# Patient Record
Sex: MALE | Race: BLACK OR AFRICAN AMERICAN | NOT HISPANIC OR LATINO | Employment: OTHER | URBAN - METROPOLITAN AREA
[De-identification: names, ages, dates, MRNs, and addresses within clinical notes are randomized per-mention and may not be internally consistent; named-entity substitution may affect disease eponyms.]

---

## 2017-01-13 DIAGNOSIS — N32.81 OVERACTIVE BLADDER: Primary | ICD-10-CM

## 2017-01-13 RX ORDER — OXYBUTYNIN CHLORIDE 5 MG/1
5 TABLET ORAL 3 TIMES DAILY
Qty: 90 TABLET | Refills: 1 | OUTPATIENT
Start: 2017-01-13

## 2017-01-13 NOTE — TELEPHONE ENCOUNTER
Request for medication refill:    Date of last visit at clinic: 11/04/16    Please complete refill if appropriate and CLOSE ENCOUNTER.    Closing the encounter signifies the refill is complete.    If refill has been denied, please complete the smart phrase .smirefuse and route it to the [facility] RN TRIAGE pool to inform the patient and the pharmacy.    Zahida Bishop

## 2017-01-24 DIAGNOSIS — R64 CACHEXIA (H): Primary | ICD-10-CM

## 2017-01-24 RX ORDER — LACTOSE-REDUCED FOOD 0.04G-1.05
1 LIQUID (ML) ORAL
Qty: 90 EACH | Refills: 6 | Status: CANCELLED | OUTPATIENT
Start: 2017-01-24

## 2017-01-24 NOTE — TELEPHONE ENCOUNTER
Request for medication refill:    Date of last visit at clinic: 11-4-16    Please complete refill if appropriate and CLOSE ENCOUNTER.    Closing the encounter signifies the refill is complete.    If refill has been denied, please complete the smart phrase .smirefuse and route it to the [facility] RN TRIAGE pool to inform the patient and the pharmacy.    Ruth Swartz

## 2017-02-15 ENCOUNTER — DOCUMENTATION ONLY (OUTPATIENT)
Dept: VASCULAR SURGERY | Facility: CLINIC | Age: 77
End: 2017-02-15

## 2017-02-15 DIAGNOSIS — N32.81 OVERACTIVE BLADDER: ICD-10-CM

## 2017-02-15 RX ORDER — OXYBUTYNIN CHLORIDE 5 MG/1
5 TABLET ORAL 3 TIMES DAILY
Qty: 90 TABLET | Refills: 1 | OUTPATIENT
Start: 2017-02-15

## 2017-02-15 NOTE — TELEPHONE ENCOUNTER
Date of last visit at clinic: 11/4/16    Please complete refill and CLOSE ENCOUNTER.  Closing the encounter signifies the refill is complete.

## 2017-02-16 NOTE — TELEPHONE ENCOUNTER
Called and left  for pharmacy advising patient is not seen at [facility].    Purvi Bartlett RN

## 2017-04-06 DIAGNOSIS — N40.1 BENIGN NON-NODULAR PROSTATIC HYPERPLASIA WITH LOWER URINARY TRACT SYMPTOMS: ICD-10-CM

## 2017-04-06 RX ORDER — TAMSULOSIN HYDROCHLORIDE 0.4 MG/1
0.4 CAPSULE ORAL
Qty: 180 CAPSULE | Refills: 0 | Status: SHIPPED | OUTPATIENT
Start: 2017-04-06 | End: 2017-07-27

## 2017-04-06 NOTE — TELEPHONE ENCOUNTER
Request for medication refill:    Date of last visit at clinic: 11/04/2016    Please complete refill if appropriate and CLOSE ENCOUNTER.    Closing the encounter signifies the refill is complete.    If refill has been denied, please complete the smart phrase .smirefuse and route it to the [facility] RN TRIAGE pool to inform the patient and the pharmacy.    Heather Phillips MA

## 2017-04-10 ENCOUNTER — OFFICE VISIT (OUTPATIENT)
Dept: NEUROSURGERY | Facility: CLINIC | Age: 77
End: 2017-04-10

## 2017-04-10 VITALS — HEIGHT: 62 IN | SYSTOLIC BLOOD PRESSURE: 115 MMHG | DIASTOLIC BLOOD PRESSURE: 64 MMHG | HEART RATE: 72 BPM

## 2017-04-10 DIAGNOSIS — Z98.1 S/P LAMINECTOMY WITH SPINAL FUSION: Primary | ICD-10-CM

## 2017-04-10 ASSESSMENT — PAIN SCALES - GENERAL: PAINLEVEL: NO PAIN (0)

## 2017-04-10 NOTE — NURSING NOTE
Chief Complaint   Patient presents with     RECHECK     8/27/15 Thoracic surgery, imaging done.    Branden Zaragoza CMA

## 2017-04-10 NOTE — LETTER
"4/10/2017       RE: Bhaskar Ott  [address]    [place] [address]     Dear Colleague,    Thank you for referring your patient, Bhaskar Ott, to the [facility] NEUROSURGERY at [facility]. Please see a copy of my visit note below.    It was a pleasure to see Bhaskar Ott today in Neurosurgery Clinic. he is a 76 year old male who has previously undergone surgery with Dr. Mcdaniels. He is here for follow up of his imaging studies from his last visit. He has a cervical MRI that shows some stenosis. He has flexion extension views that show no obvious instability.  He continues to have pain in the L hip and leg, for which Dr. Mcdaniels has previously recommended assessment for a L ANNE.    Vitals:    04/10/17 1107   BP: 115/64   Pulse: 72   Height: 1.575 m (5' 2\")     There is no height or weight on file to calculate BMI.  No Pain (0)    Strength 5/5 BUE/LE    Imaging as above.    A: s/p thoracolumbar fusion.    P: RTC 1 year with standing scoliosis xrays. No surgical intervention at this time.    Again, thank you for allowing me to participate in the care of your patient.      Sincerely,    Saul Bettencourt MD      "

## 2017-04-10 NOTE — MR AVS SNAPSHOT
After Visit Summary   4/10/2017    Bhaskar Ott    MRN: [id number]           Patient Information     Date Of Birth          1940        Visit Information        Provider Department      4/10/2017 11:15 AM Saul Bettencourt MD [facility] Neurosurgery        Today's Diagnoses     S/P laminectomy with spinal fusion    -  1       Follow-ups after your visit        Follow-up notes from your care team     Return in about 1 year (around 4/10/2018) for Imaging.      Your next 10 appointments already scheduled     Apr 09, 2018 10:50 AM CDT   XR THORACIC/LUMBAR STANDING 2 VIEWS (SCOLI) with UCXR4   [facility] Xray ([facility])    [address]  1st Floor  [facility] [address] [phone]           Please bring a list of your current medicines to your exam. (Include vitamins, minerals and over-thecounter medicines.) Leave your valuables at home.  Tell your doctor if there is a chance you may be pregnant.  You do not need to do anything special for this exam.            Apr 09, 2018 11:15 AM CDT   (Arrive by 11:00 AM)   Return Visit with Saul Bettencourt MD   [facility] Neurosurgery ([facility])    [address]  3rd Floor  [facility] [address] [phone]              Future tests that were ordered for you today     Open Future Orders        Priority Expected Expires Ordered    XR Thor/Lumb Standing 2 Views (Scoli) Routine 4/10/2018 4/10/2018 4/10/2017            Who to contact     Please call your clinic at [phone] to:    Ask questions about your health    Make or cancel appointments    Discuss your medicines    Learn about your test results    Speak to your doctor   If you have compliments or concerns about an experience at your clinic, or if you wish to file a complaint, please contact [facility] Patient Relations at [phone] or email us at  [email or web address]         Additional Information About Your Visit        [employer] Information     [employer] is an electronic gateway that provides easy, online access to your medical records. With [employer], you can request a clinic appointment, read your test results, renew a prescription or communicate with your care team.     To sign up for [employer] visit the website at [email or web address]   You will be asked to enter the access code listed below, as well as some personal information. Please follow the directions to create your username and password.     Your access code is: [email or web address]  Expires: 2017  7:31 AM     Your access code will  in 90 days. If you need help or a new code, please contact your [facility] or call [phone] for assistance.        Care EveryWhere ID     This is your Care EveryWhere ID. This could be used by other organizations to access your [place] medical records  [id number]        Your Vitals Were     Pulse Height                72 1.575 m (5' 2\")           Blood Pressure from Last 3 Encounters:   04/10/17 115/64   16 117/68   16 126/73    Weight from Last 3 Encounters:   16 60.8 kg (134 lb)   16 62.7 kg (138 lb 3.2 oz)   16 60.6 kg (133 lb 9.6 oz)               Primary Care Provider Office Phone # Fax #    Camille Heard [phone]       [place] [address] [address]        Thank you!     Thank you for choosing [facility]  for your care. Our goal is always to provide you with excellent care. Hearing back from our patients is one way we can continue to improve our services. Please take a few minutes to complete the written survey that you may receive in the mail after your visit with us. Thank you!             Your Updated Medication List - Protect others around you: Learn how to safely use, store and throw away your " medicines at www.disposemymeds.org.          This list is accurate as of: 4/10/17 12:11 PM.  Always use your most recent med list.                   Brand Name Dispense Instructions for use    amLODIPine 5 MG tablet    NORVASC    30 tablet    Take 1 tablet (5 mg) by mouth daily       B-12 1000 MCG Tbcr     100 tablet    Take 1,000 mcg by mouth daily       BOOST BREEZE Liqd     90 each    Take 1 Can by mouth 3 times daily (with meals)       calcium carb 1250 mg (500 mg [place])/vitamin D 200 units 500-200 MG-UNIT per tablet    OSCAL with D    90 tablet    Take 1 tablet by mouth daily       cholecalciferol 1000 UNIT tablet    vitamin D    90 tablet    Take 1 tablet (1,000 Units) by mouth daily       ferrous gluconate 325 (36 FE) MG Tabs     180 tablet    Take 1 tablet by mouth 2 times daily       finasteride 5 MG tablet    PROSCAR    90 tablet    Take 1 tablet (5 mg) by mouth daily       MULTIvitamin  S Caps     90 capsule    Take 1 tablet by mouth daily       omeprazole 20 MG CR capsule    priLOSEC    90 capsule    Take 1 capsule (20 mg) by mouth 2 times daily       oxybutynin 5 MG tablet    DITROPAN    90 tablet    Take 1 tablet (5 mg) by mouth 3 times daily       oxyCODONE 5 MG IR tablet    ROXICODONE    80 tablet    Take 1 tablet (5 mg) by mouth every 12 hours as needed for moderate to severe pain       polyethylene glycol Packet    MIRALAX/GLYCOLAX    30 packet    Take 34 g by mouth daily       pravastatin 40 MG tablet    PRAVACHOL    90 tablet    Take 1 tablet (40 mg) by mouth daily       senna-docusate 8.6-50 MG per tablet    SENOKOT-S;PERICOLACE    60 tablet    Take 1 tablet by mouth 2 times daily       tamsulosin 0.4 MG capsule    FLOMAX    180 capsule    Take 1 capsule (0.4 mg) by mouth two times daily       traZODone 50 MG tablet    DESYREL    60 tablet    Take 1-2 tablets ( mg) by mouth nightly as needed for sleep May increase to 2 tabs after one week if ineffective

## 2017-04-10 NOTE — PROGRESS NOTES
"It was a pleasure to see Bhaskar Ott today in Neurosurgery Clinic. he is a 76 year old male who has previously undergone surgery with Dr. Mcdaniels. He is here for follow up of his imaging studies from his last visit. He has a cervical MRI that shows some stenosis. He has flexion extension views that show no obvious instability.  He continues to have pain in the L hip and leg, for which Dr. Mcdaniels has previously recommended assessment for a L ANNE.    Vitals:    04/10/17 1107   BP: 115/64   Pulse: 72   Height: 1.575 m (5' 2\")     There is no height or weight on file to calculate BMI.  No Pain (0)    Strength 5/5 BUE/LE    Imaging as above.    A: s/p thoracolumbar fusion.    P: RTC 1 year with standing scoliosis xrays. No surgical intervention at this time.  "

## 2017-04-17 DIAGNOSIS — E78.00 PURE HYPERCHOLESTEROLEMIA: ICD-10-CM

## 2017-04-17 RX ORDER — PRAVASTATIN SODIUM 40 MG
40 TABLET ORAL DAILY
Qty: 90 TABLET | Refills: 6 | OUTPATIENT
Start: 2017-04-17

## 2017-04-17 NOTE — TELEPHONE ENCOUNTER
Medication Refill Denied  Reason: Patient needs: no longer in our care   Provider: I have not called the patient about the Rx denial, please call.  PCS: Please notify the pharmacy  RN: Please contact the patient to explain reasoning provided above and to ask pharmac to reroute to current PCP}.    RN may not order temporary refill so that the patient will not run out of medication prior to the scheduled visit.    Efren Dumont MD

## 2017-04-17 NOTE — TELEPHONE ENCOUNTER
Request for medication refill:    Date of last visit at clinic: 11/4/2016    Please complete refill if appropriate and CLOSE ENCOUNTER.    Closing the encounter signifies the refill is complete.    If refill has been denied, please complete the smart phrase .smirefuse and route it to the [facility] RN TRIAGE pool to inform the patient and the pharmacy.    Heather Phillips MA

## 2017-05-11 DIAGNOSIS — I10 BENIGN ESSENTIAL HYPERTENSION: ICD-10-CM

## 2017-05-11 RX ORDER — AMLODIPINE BESYLATE 5 MG/1
5 TABLET ORAL DAILY
Qty: 30 TABLET | Refills: 11 | OUTPATIENT
Start: 2017-05-11

## 2017-05-11 NOTE — TELEPHONE ENCOUNTER
Please call pharmacy for denial of medication   Patient has changed PCPs and no longer comes to [facility]

## 2017-05-11 NOTE — TELEPHONE ENCOUNTER
Request for medication refill:    I am sending this refill to you due to the fact that the PCP is not a provider here at [facility] and your name was on the Rx.   Thank you      Date of last visit at clinic: 11-4-16    Please complete refill if appropriate and CLOSE ENCOUNTER.    Closing the encounter signifies the refill is complete.    If refill has been denied, please complete the smart phrase .smirefuse and route it to the [facility] RN TRIAGE pool to inform the patient and the pharmacy.    Ruth Swartz

## 2017-05-11 NOTE — TELEPHONE ENCOUNTER
Called and left VM for pharmacy to advise all refill requests should be sent to new PCP.    Purvi Bartlett RN

## 2017-06-23 ENCOUNTER — OFFICE VISIT (OUTPATIENT)
Dept: FAMILY MEDICINE | Facility: CLINIC | Age: 77
End: 2017-06-23
Payer: COMMERCIAL

## 2017-06-23 VITALS
TEMPERATURE: 97.6 F | BODY MASS INDEX: 24.33 KG/M2 | HEART RATE: 71 BPM | WEIGHT: 133 LBS | OXYGEN SATURATION: 97 % | DIASTOLIC BLOOD PRESSURE: 78 MMHG | SYSTOLIC BLOOD PRESSURE: 120 MMHG

## 2017-06-23 DIAGNOSIS — Z86.0100 HISTORY OF COLONIC POLYPS: Primary | ICD-10-CM

## 2017-06-23 DIAGNOSIS — R53.83 FATIGUE, UNSPECIFIED TYPE: ICD-10-CM

## 2017-06-23 DIAGNOSIS — Z13.6 CARDIOVASCULAR SCREENING; LDL GOAL LESS THAN 100: ICD-10-CM

## 2017-06-23 DIAGNOSIS — R73.01 IMPAIRED FASTING GLUCOSE: ICD-10-CM

## 2017-06-23 DIAGNOSIS — N40.1 BENIGN NON-NODULAR PROSTATIC HYPERPLASIA WITH LOWER URINARY TRACT SYMPTOMS: ICD-10-CM

## 2017-06-23 PROBLEM — Z71.89 ADVANCED DIRECTIVES, COUNSELING/DISCUSSION: Status: ACTIVE | Noted: 2017-06-23

## 2017-06-23 LAB
BASOPHILS # BLD AUTO: 0.1 10E9/L (ref 0–0.2)
BASOPHILS NFR BLD AUTO: 0.8 %
DIFFERENTIAL METHOD BLD: ABNORMAL
EOSINOPHIL # BLD AUTO: 0.2 10E9/L (ref 0–0.7)
EOSINOPHIL NFR BLD AUTO: 2.7 %
ERYTHROCYTE [DISTWIDTH] IN BLOOD BY AUTOMATED COUNT: 12.7 % (ref 10–15)
HBA1C MFR BLD: 5.5 % (ref 4.3–6)
HCT VFR BLD AUTO: 36.9 % (ref 40–53)
HGB BLD-MCNC: 12.7 G/DL (ref 13.3–17.7)
LYMPHOCYTES # BLD AUTO: 1.5 10E9/L (ref 0.8–5.3)
LYMPHOCYTES NFR BLD AUTO: 23 %
MCH RBC QN AUTO: 31.7 PG (ref 26.5–33)
MCHC RBC AUTO-ENTMCNC: 34.4 G/DL (ref 31.5–36.5)
MCV RBC AUTO: 92 FL (ref 78–100)
MONOCYTES # BLD AUTO: 0.5 10E9/L (ref 0–1.3)
MONOCYTES NFR BLD AUTO: 8.2 %
NEUTROPHILS # BLD AUTO: 4.3 10E9/L (ref 1.6–8.3)
NEUTROPHILS NFR BLD AUTO: 65.3 %
PLATELET # BLD AUTO: 218 10E9/L (ref 150–450)
RBC # BLD AUTO: 4.01 10E12/L (ref 4.4–5.9)
WBC # BLD AUTO: 6.6 10E9/L (ref 4–11)

## 2017-06-23 PROCEDURE — 80050 GENERAL HEALTH PANEL: CPT | Performed by: FAMILY MEDICINE

## 2017-06-23 PROCEDURE — 83036 HEMOGLOBIN GLYCOSYLATED A1C: CPT | Performed by: FAMILY MEDICINE

## 2017-06-23 PROCEDURE — 99214 OFFICE O/P EST MOD 30 MIN: CPT | Performed by: FAMILY MEDICINE

## 2017-06-23 PROCEDURE — 36415 COLL VENOUS BLD VENIPUNCTURE: CPT | Performed by: FAMILY MEDICINE

## 2017-06-23 PROCEDURE — 80061 LIPID PANEL: CPT | Performed by: FAMILY MEDICINE

## 2017-06-23 RX ORDER — FINASTERIDE 5 MG/1
5 TABLET, FILM COATED ORAL DAILY
Qty: 90 TABLET | Refills: 3 | Status: SHIPPED | OUTPATIENT
Start: 2017-06-23 | End: 2018-09-08

## 2017-06-23 ASSESSMENT — PAIN SCALES - GENERAL: PAINLEVEL: SEVERE PAIN (7)

## 2017-06-23 NOTE — NURSING NOTE
"Chief Complaint   Patient presents with     Establish Care       Initial /78 (BP Location: Right arm, Patient Position: Chair, Cuff Size: Adult Regular)  Pulse 71  Temp 97.6  F (36.4  C) (Oral)  Wt 133 lb (60.3 kg)  SpO2 97%  BMI 24.33 kg/m2 Estimated body mass index is 24.33 kg/(m^2) as calculated from the following:    Height as of 4/10/17: 5' 2\" (1.575 m).    Weight as of this encounter: 133 lb (60.3 kg).  Medication Reconciliation: complete   Mariann Heard CMA      "

## 2017-06-23 NOTE — PROGRESS NOTES
SUBJECTIVE:                                                    Bhaskar Ott is a 76 year old male who presents to clinic today for the following health issues:       Establish care-he is concerned he has diabetes    none    Problem list and histories reviewed & adjusted, as indicated.  Additional history: as documented         Reviewed and updated as needed this visit by clinical staff  Allergies  Meds  Problems       Reviewed and updated as needed this visit by Provider          [facility]            [place]       Quit smoking for 4-5 years    Skin dry    Likes sugar    Dark urine    Urinating a lot    Thirsty    No wt change    Exercises    Stretches    Back problems    Has got down to lower dose of pain med    Gas from below      Constipated    Physical Exam   Constitutional: He is oriented to person, place, and time and well-developed, well-nourished, and in no distress. No distress.   HENT:   Head: Normocephalic and atraumatic.   Eyes: Conjunctivae are normal.   Neck: Carotid bruit is not present.   Cardiovascular: Normal rate, regular rhythm, normal heart sounds and intact distal pulses.  Exam reveals no gallop and no friction rub.    No murmur heard.  Pulmonary/Chest: Effort normal and breath sounds normal. No respiratory distress. He has no wheezes. He has no rales.   Abdominal: Soft. There is no tenderness.   Musculoskeletal: He exhibits no edema.   Neurological: He is alert and oriented to person, place, and time.   Skin: He is not diaphoretic.   Psychiatric: Mood and affect normal.   some soreness low back area        ASSESSMENT / PLAN:  (Z86.010) History of colonic polyps  (primary encounter diagnosis)  Comment: patient needs colonoscopy, history of past polyps  Plan: GASTROENTEROLOGY ADULT REF PROCEDURE ONLY             (R73.01) Impaired fasting glucose  Comment: check labs   Plan: Hemoglobin A1c         A year ago hemoglobin a1c okay, mid 5s      (Z13.6) CARDIOVASCULAR SCREENING; LDL GOAL LESS THAN 100  Comment: check today.  Last meal was breakfast, had protein drink before he came here   Plan: Lipid panel reflex to direct LDL             (R53.83) Fatigue, unspecified type  Comment: check labs   Plan: CBC with platelets differential, Comprehensive         metabolic panel, TSH with free T4 reflex             (N40.1) Benign non-nodular prostatic hyperplasia with lower urinary tract symptoms  Comment: needs refill   Plan: finasteride (PROSCAR) 5 MG tablet           Patient will continue to go to the other clinic for his pain needs.       I reviewed the patient's medications, allergies, medical history, family history, and social history.    Dayday Alberts MD

## 2017-06-23 NOTE — LETTER
[facility]  [address] [place]  [address]  [phone]        June 28, 2017    Bhaskar Ott  [address]    [facility] [facility] [address]        Dear Bhaskar,    Your potassium is a bit high.  If you are taking any extra over the counter potassium, I advise holding off on that.       Increase fluid intake, especially water.     Other lab are stable.     The normal hemoglobin a1c means you do not have diabetes.     Results for orders placed or performed in visit on 06/23/17   CBC with platelets differential   Result Value Ref Range    WBC 6.6 4.0 - 11.0 10e9/L    RBC Count 4.01 (L) 4.4 - 5.9 10e12/L    Hemoglobin 12.7 (L) 13.3 - 17.7 g/dL    Hematocrit 36.9 (L) 40.0 - 53.0 %    MCV 92 78 - 100 fl    MCH 31.7 26.5 - 33.0 pg    MCHC 34.4 31.5 - 36.5 g/dL    RDW 12.7 10.0 - 15.0 %    Platelet Count 218 150 - 450 10e9/L    Diff Method Automated Method     % Neutrophils 65.3 %    % Lymphocytes 23.0 %    % Monocytes 8.2 %    % Eosinophils 2.7 %    % Basophils 0.8 %    Absolute Neutrophil 4.3 1.6 - 8.3 10e9/L    Absolute Lymphocytes 1.5 0.8 - 5.3 10e9/L    Absolute Monocytes 0.5 0.0 - 1.3 10e9/L    Absolute Eosinophils 0.2 0.0 - 0.7 10e9/L    Absolute Basophils 0.1 0.0 - 0.2 10e9/L   Comprehensive metabolic panel   Result Value Ref Range    Sodium 140 133 - 144 mmol/L    Potassium 5.6 (H) 3.4 - 5.3 mmol/L    Chloride 105 94 - 109 mmol/L    Carbon Dioxide 30 20 - 32 mmol/L    Anion Gap 5 3 - 14 mmol/L    Glucose 91 70 - 99 mg/dL    Urea Nitrogen 31 (H) 7 - 30 mg/dL    Creatinine 1.14 0.66 - 1.25 mg/dL    GFR Estimate 62 >60 mL/min/1.7m2    GFR Estimate If Black 75 >60 mL/min/1.7m2    Calcium 9.4 8.5 - 10.1 mg/dL    Bilirubin Total 0.4 0.2 - 1.3 mg/dL    Albumin 4.1 3.4 - 5.0 g/dL    Protein Total 7.3 6.8 - 8.8 g/dL    Alkaline Phosphatase 83 40 - 150 U/L    ALT 34 0 - 70 U/L    AST 27 0 - 45 U/L   TSH with free T4 reflex   Result Value Ref Range    TSH 0.83 0.40 - 4.00  mU/L   Hemoglobin A1c   Result Value Ref Range    Hemoglobin A1C 5.5 4.3 - 6.0 %   Lipid panel reflex to direct LDL   Result Value Ref Range    Cholesterol 171 <200 mg/dL    Triglycerides 72 <150 mg/dL    HDL Cholesterol 77 >39 mg/dL    LDL Cholesterol Calculated 80 <100 mg/dL    Non HDL Cholesterol 94 <130 mg/dL       If you have any questions please call the clinic at [phone].    Sincerely,    Dayday JANGL

## 2017-06-23 NOTE — MR AVS SNAPSHOT
After Visit Summary   6/23/2017    Bhaskar Ott    MRN: [id number]           Patient Information     Date Of Birth          1940        Visit Information        Provider Department      6/23/2017 4:20 PM Dayday Alberts MD [facility]        Today's Diagnoses     History of colonic polyps    -  1    Impaired fasting glucose        CARDIOVASCULAR SCREENING; LDL GOAL LESS THAN 100        Fatigue, unspecified type        Benign non-nodular prostatic hyperplasia with lower urinary tract symptoms          Care Instructions    Okay to continue melatonin    Call and schedule colonoscopy    We will send you lab results              Follow-ups after your visit        Additional Services     GASTROENTEROLOGY ADULT REF PROCEDURE ONLY       Last Lab Result: Creatinine (mg/dL)       Date                     Value                 06/29/2016               1.2              ----------  There is no height or weight on file to calculate BMI.     Needed:  No  Language:  English    Patient will be contacted to schedule procedure.     Please be aware that coverage of these services is subject to the terms and limitations of your health insurance plan.  Call member services at your health plan with any benefit or coverage questions.  Any procedures must be performed at a [facility] facility OR coordinated by your clinic's referral office.    Please bring the following with you to your appointment:    (1) Any X-Rays, CTs or MRIs which have been performed.  Contact the facility where they were done to arrange for  prior to your scheduled appointment.    (2) List of current medications   (3) This referral request   (4) Any documents/labs given to you for this referral                  Your next 10 appointments already scheduled     Apr 09, 2018 10:50 AM CDT   XR THORACIC/LUMBAR STANDING 2 VIEWS (SCOLI) with UCXR4   [facility] Xray ([facility] Clinics and Surgery  "Center)    [address]  [address] [facility] [id number] [phone]           Please bring a list of your current medicines to your exam. (Include vitamins, minerals and over-thecounter medicines.) Leave your valuables at home.  Tell your doctor if there is a chance you may be pregnant.  You do not need to do anything special for this exam.            Apr 09, 2018 11:15 AM CDT   (Arrive by 11:00 AM)   Return Visit with Saul Bettencourt MD   [facility] Neurosurgery ([facility])    [address]  [address] [facility] [id number] [phone]              Who to contact     If you have questions or need follow up information about today's clinic visit or your schedule please contact [facility] directly at [phone].  Normal or non-critical lab and imaging results will be communicated to you by MyChart, letter or phone within 4 business days after the clinic has received the results. If you do not hear from us within 7 days, please contact the clinic through MyChart or phone. If you have a critical or abnormal lab result, we will notify you by phone as soon as possible.  Submit refill requests through [employer] or call your pharmacy and they will forward the refill request to us. Please allow 3 business days for your refill to be completed.          Additional Information About Your Visit        MyChart Information     Wellocities lets you send messages to your doctor, view your test results, renew your prescriptions, schedule appointments and more. To sign up, go to [email or web address] . Click on \"Log in\" on the left side of the screen, which will take you to the Welcome page. Then click on \"Sign up Now\" on the right side of the page.     You will be asked to enter the access code listed below, as well as some personal information. Please follow the directions to create your username and password.     Your access code is: " [email or web address]  Expires: 2017  4:51 PM     Your access code will  in 90 days. If you need help or a new code, please call your [facility] clinic or [phone].        Care EveryWhere ID     This is your Care EveryWhere ID. This could be used by other organizations to access your [facility] medical records  [id number]        Your Vitals Were     Pulse Temperature Pulse Oximetry BMI (Body Mass Index)          71 97.6  F (36.4  C) (Oral) 97% 24.33 kg/m2         Blood Pressure from Last 3 Encounters:   17 120/78   04/10/17 115/64   16 117/68    Weight from Last 3 Encounters:   17 133 lb (60.3 kg)   16 134 lb (60.8 kg)   16 138 lb 3.2 oz (62.7 kg)              We Performed the Following     CBC with platelets differential     Comprehensive metabolic panel     GASTROENTEROLOGY ADULT REF PROCEDURE ONLY     Hemoglobin A1c     Lipid panel reflex to direct LDL     TSH with free T4 reflex          Where to get your medicines      These medications were sent to [id number] - [place], [place] -  [address], [place] [address]     Phone:  [phone]     finasteride 5 MG tablet          Primary Care Provider Office Phone # Fax #    Camille Heard [phone]       [facility] [address]  [place] [address]        Equal Access to Services     DAVID BENITEZ AH: Hadii nohelia ku hadmanuelitoo Sochapincito, waaxda luqadaha, qaybta kaalmada jason, waxcordell denny enriquez adeelizabeth coronado. So [facility] [phone].    ATENCIÓN: Si habla español, tiene a bhatti disposición servicios gratuitos de asistencia lingüística. Llame al [phone].    We comply with applicable federal civil rights laws and [place] laws. We do not discriminate on the basis of race, color, national origin, age, disability sex, sexual orientation or gender identity.            Thank you!     Thank you for choosing [facility]  for your  care. Our goal is always to provide you with excellent care. Hearing back from our patients is one way we can continue to improve our services. Please take a few minutes to complete the written survey that you may receive in the mail after your visit with us. Thank you!             Your Updated Medication List - Protect others around you: Learn how to safely use, store and throw away your medicines at www.disposemymeds.org.          This list is accurate as of: 6/23/17  4:51 PM.  Always use your most recent med list.                   Brand Name Dispense Instructions for use Diagnosis    amLODIPine 5 MG tablet    NORVASC    30 tablet    Take 1 tablet (5 mg) by mouth daily    Benign essential hypertension       B-12 1000 MCG Tbcr     100 tablet    Take 1,000 mcg by mouth daily    Cognitive impairment       BOOST BREEZE Liqd     90 each    Take 1 Can by mouth 3 times daily (with meals)    Cachexia (H)       calcium carb 1250 mg (500 mg Agdaagux)/vitamin D 200 units 500-200 MG-UNIT per tablet    OSCAL with D    90 tablet    Take 1 tablet by mouth daily    Chronic lower back pain       cholecalciferol 1000 UNIT tablet    vitamin D    90 tablet    Take 1 tablet (1,000 Units) by mouth daily    Vitamin D deficiency       ferrous gluconate 325 (36 FE) MG Tabs     180 tablet    Take 1 tablet by mouth 2 times daily    Iron deficiency anemia, unspecified       finasteride 5 MG tablet    PROSCAR    90 tablet    Take 1 tablet (5 mg) by mouth daily    Benign non-nodular prostatic hyperplasia with lower urinary tract symptoms       MULTIvitamin  S Caps     90 capsule    Take 1 tablet by mouth daily    Iron deficiency anemia, unspecified       omeprazole 20 MG CR capsule    priLOSEC    90 capsule    Take 1 capsule (20 mg) by mouth 2 times daily    Gastroesophageal reflux disease without esophagitis       oxybutynin 5 MG tablet    DITROPAN    90 tablet    Take 1 tablet (5 mg) by mouth 3 times daily    Overactive bladder       oxyCODONE  5 MG IR tablet    ROXICODONE    80 tablet    Take 1 tablet (5 mg) by mouth every 12 hours as needed for moderate to severe pain    Chronic pain syndrome, Spinal stenosis in cervical region       polyethylene glycol Packet    MIRALAX/GLYCOLAX    30 packet    Take 34 g by mouth daily    Thoracic spondylosis with myelopathy       pravastatin 40 MG tablet    PRAVACHOL    90 tablet    Take 1 tablet (40 mg) by mouth daily    Pure hypercholesterolemia       senna-docusate 8.6-50 MG per tablet    SENOKOT-S;PERICOLACE    60 tablet    Take 1 tablet by mouth 2 times daily    Chronic pain syndrome       tamsulosin 0.4 MG capsule    FLOMAX    180 capsule    Take 1 capsule (0.4 mg) by mouth two times daily    Benign non-nodular prostatic hyperplasia with lower urinary tract symptoms

## 2017-06-26 LAB
ALBUMIN SERPL-MCNC: 4.1 G/DL (ref 3.4–5)
ALP SERPL-CCNC: 83 U/L (ref 40–150)
ALT SERPL W P-5'-P-CCNC: 34 U/L (ref 0–70)
ANION GAP SERPL CALCULATED.3IONS-SCNC: 5 MMOL/L (ref 3–14)
AST SERPL W P-5'-P-CCNC: 27 U/L (ref 0–45)
BILIRUB SERPL-MCNC: 0.4 MG/DL (ref 0.2–1.3)
BUN SERPL-MCNC: 31 MG/DL (ref 7–30)
CALCIUM SERPL-MCNC: 9.4 MG/DL (ref 8.5–10.1)
CHLORIDE SERPL-SCNC: 105 MMOL/L (ref 94–109)
CHOLEST SERPL-MCNC: 171 MG/DL
CO2 SERPL-SCNC: 30 MMOL/L (ref 20–32)
CREAT SERPL-MCNC: 1.14 MG/DL (ref 0.66–1.25)
GFR SERPL CREATININE-BSD FRML MDRD: 62 ML/MIN/1.7M2
GLUCOSE SERPL-MCNC: 91 MG/DL (ref 70–99)
HDLC SERPL-MCNC: 77 MG/DL
LDLC SERPL CALC-MCNC: 80 MG/DL
NONHDLC SERPL-MCNC: 94 MG/DL
POTASSIUM SERPL-SCNC: 5.6 MMOL/L (ref 3.4–5.3)
PROT SERPL-MCNC: 7.3 G/DL (ref 6.8–8.8)
SODIUM SERPL-SCNC: 140 MMOL/L (ref 133–144)
TRIGL SERPL-MCNC: 72 MG/DL
TSH SERPL DL<=0.005 MIU/L-ACNC: 0.83 MU/L (ref 0.4–4)

## 2017-06-28 NOTE — PROGRESS NOTES
Your potassium is a bit high.  If you are taking any extra over the counter potassium, I advise holding off on that.      Increase fluid intake, especially water.    Other lab are stable.    The normal hemoglobin a1c means you do not have diabetes.    Dayday Alberts MD

## 2017-06-30 ENCOUNTER — TELEPHONE (OUTPATIENT)
Dept: NURSING | Facility: CLINIC | Age: 77
End: 2017-06-30

## 2017-06-30 ENCOUNTER — NURSE TRIAGE (OUTPATIENT)
Dept: NURSING | Facility: CLINIC | Age: 77
End: 2017-06-30

## 2017-06-30 NOTE — TELEPHONE ENCOUNTER
Patient went through his lab results findings, notified him of MD concerns noted in epic and his recommendations. Patient states he would like provider to be aware that he is having dizzy spells and he is bruising very easily and wondering if that is why his blood levels were a little bit lower. He would like a call back from clinic on Monday to discuss these results and his concerns, and also try and schedule his colonoscopy.     Viv Bland RN, BSN  [facility]

## 2017-06-30 NOTE — TELEPHONE ENCOUNTER
Patient went through his lab results findings, notified him of MD concerns noted in epic and his recommendations. Patient states he would like provider to be aware that he is having dizzy spells and he is bruising very easily and wondering if that is why his blood levels were a little bit lower. He would like a call back from clinic on Monday to discuss these results and his concerns, and also try and schedule his colonoscopy.  Please call patient back at [phone].     Viv Bland RN, BSN  [facility]

## 2017-07-03 NOTE — TELEPHONE ENCOUNTER
I called and discussed in detail with patient.     He will see us in clinic in the next 1-2 weeks.  Sooner if needed.    To emergency room if symptoms worsen.    Dayday Alberts MD

## 2017-07-21 ENCOUNTER — OFFICE VISIT (OUTPATIENT)
Dept: FAMILY MEDICINE | Facility: CLINIC | Age: 77
End: 2017-07-21
Payer: COMMERCIAL

## 2017-07-21 VITALS
DIASTOLIC BLOOD PRESSURE: 56 MMHG | WEIGHT: 133.25 LBS | HEART RATE: 61 BPM | OXYGEN SATURATION: 99 % | BODY MASS INDEX: 24.37 KG/M2 | SYSTOLIC BLOOD PRESSURE: 104 MMHG | TEMPERATURE: 98.1 F

## 2017-07-21 DIAGNOSIS — Z12.11 SCREEN FOR COLON CANCER: ICD-10-CM

## 2017-07-21 DIAGNOSIS — D64.9 ANEMIA, UNSPECIFIED TYPE: ICD-10-CM

## 2017-07-21 DIAGNOSIS — R35.0 URINARY FREQUENCY: ICD-10-CM

## 2017-07-21 DIAGNOSIS — Z12.5 SCREENING FOR PROSTATE CANCER: ICD-10-CM

## 2017-07-21 DIAGNOSIS — E87.5 HYPERKALEMIA: ICD-10-CM

## 2017-07-21 DIAGNOSIS — R10.13 ABDOMINAL PAIN, EPIGASTRIC: Primary | ICD-10-CM

## 2017-07-21 LAB
FERRITIN SERPL-MCNC: 150 NG/ML (ref 26–388)
FOLATE SERPL-MCNC: 76.7 NG/ML
IRON SATN MFR SERPL: 24 % (ref 15–46)
IRON SERPL-MCNC: 70 UG/DL (ref 35–180)
POTASSIUM SERPL-SCNC: 4.3 MMOL/L (ref 3.4–5.3)
PSA SERPL-ACNC: 0.56 UG/L (ref 0–4)
TIBC SERPL-MCNC: 290 UG/DL (ref 240–430)
VIT B12 SERPL-MCNC: 1916 PG/ML (ref 193–986)

## 2017-07-21 PROCEDURE — 84132 ASSAY OF SERUM POTASSIUM: CPT | Performed by: FAMILY MEDICINE

## 2017-07-21 PROCEDURE — 83550 IRON BINDING TEST: CPT | Performed by: FAMILY MEDICINE

## 2017-07-21 PROCEDURE — 36415 COLL VENOUS BLD VENIPUNCTURE: CPT | Performed by: FAMILY MEDICINE

## 2017-07-21 PROCEDURE — 82746 ASSAY OF FOLIC ACID SERUM: CPT | Performed by: FAMILY MEDICINE

## 2017-07-21 PROCEDURE — 83540 ASSAY OF IRON: CPT | Performed by: FAMILY MEDICINE

## 2017-07-21 PROCEDURE — 99214 OFFICE O/P EST MOD 30 MIN: CPT | Performed by: FAMILY MEDICINE

## 2017-07-21 PROCEDURE — G0103 PSA SCREENING: HCPCS | Performed by: FAMILY MEDICINE

## 2017-07-21 PROCEDURE — 82607 VITAMIN B-12: CPT | Performed by: FAMILY MEDICINE

## 2017-07-21 PROCEDURE — 82728 ASSAY OF FERRITIN: CPT | Performed by: FAMILY MEDICINE

## 2017-07-21 ASSESSMENT — PATIENT HEALTH QUESTIONNAIRE - PHQ9: 5. POOR APPETITE OR OVEREATING: NOT AT ALL

## 2017-07-21 ASSESSMENT — ANXIETY QUESTIONNAIRES
IF YOU CHECKED OFF ANY PROBLEMS ON THIS QUESTIONNAIRE, HOW DIFFICULT HAVE THESE PROBLEMS MADE IT FOR YOU TO DO YOUR WORK, TAKE CARE OF THINGS AT HOME, OR GET ALONG WITH OTHER PEOPLE: SOMEWHAT DIFFICULT
5. BEING SO RESTLESS THAT IT IS HARD TO SIT STILL: NOT AT ALL
3. WORRYING TOO MUCH ABOUT DIFFERENT THINGS: NOT AT ALL
6. BECOMING EASILY ANNOYED OR IRRITABLE: NOT AT ALL
1. FEELING NERVOUS, ANXIOUS, OR ON EDGE: NOT AT ALL
7. FEELING AFRAID AS IF SOMETHING AWFUL MIGHT HAPPEN: NOT AT ALL
2. NOT BEING ABLE TO STOP OR CONTROL WORRYING: SEVERAL DAYS
GAD7 TOTAL SCORE: 1

## 2017-07-21 ASSESSMENT — PAIN SCALES - GENERAL: PAINLEVEL: SEVERE PAIN (7)

## 2017-07-21 NOTE — LETTER
[facility]  [address] [place]  [address]  [phone]        July 24, 2017    Bhaskar Ott  [address]    [facility] [address]        Dear Bhaskar,    Your potassium level is now now normal.     The other labs listed here are okay.     Results for orders placed or performed in visit on 07/21/17   Potassium   Result Value Ref [facility]    Potassium 4.3 3.4 - 5.3 mmol/L   Ferritin   Result Value Ref [facility]    Ferritin 150 26 - 388 ng/mL   Vitamin B12   Result Value Ref [facility]    Vitamin B12 1916 (H) 193 - 986 pg/mL   Folate   Result Value Ref [facility]    Folate 76.7 >5.4 ng/mL   Prostate spec antigen screen   Result Value Ref [facility]    PSA 0.56 0 - 4 ug/L   Iron and iron binding capacity   Result Value Ref [facility]    Iron 70 35 - 180 ug/dL    Iron Binding Cap 290 240 - 430 ug/dL    Iron Saturation Index 24 15 - 46 %       If you have any questions please call the clinic at [phone].    Sincerely,    Dayday JANGL

## 2017-07-21 NOTE — NURSING NOTE
"Chief Complaint   Patient presents with     Patient Request     Health Maintenance     GAYLE-7 and PHQ-9       Initial /56 (BP Location: Right arm, Patient Position: Chair, Cuff Size: Adult Regular)  Pulse 61  Temp 98.1  F (36.7  C) (Oral)  Wt 133 lb 4 oz (60.4 kg)  SpO2 99%  BMI 24.37 kg/m2 Estimated body mass index is 24.37 kg/(m^2) as calculated from the following:    Height as of 4/10/17: 5' 2\" (1.575 m).    Weight as of this encounter: 133 lb 4 oz (60.4 kg).  Medication Reconciliation: complete   Mariann Heard CMA      "

## 2017-07-21 NOTE — PROGRESS NOTES
SUBJECTIVE:                                                    Bhaskar Ott is a 76 year old male who presents to clinic today for the following health issues:       Follow up from last visit and discuss the symptoms he is having    none    Problem list and histories reviewed & adjusted, as indicated.  Additional history: as documented         Reviewed and updated as needed this visit by clinical staff  Problems       Reviewed and updated as needed this visit by Provider    Eating fine    Trying to drink fluids more    Has dentures              when does stretches, gets sore on stomach area; tender then when it happens    Dark urine, smells bad, up a lot at night to urinate 4-6 x     Patient has colonoscopy scheduled for August 1     No new chest pain/ breathing problems    Exam;  Heart and lungs sound okay    abd soft, moderate generalized discomfort     abd does feel firm; difficult to tell if this is just contraction of abd muscles     Mentation fine    No edema    See phq9 and gad7    Reviewed labs in detail from last time    ASSESSMENT / PLAN:  (R10.13) Abdominal pain, epigastric  (primary encounter diagnosis)  Comment: given pain and anemia, prudent to add upper scope to the colonoscopy   Plan: GASTROENTEROLOGY ADULT REF PROCEDURE ONLY        Redid order     (D64.9) Anemia, unspecified type  Comment: as above   Plan: GASTROENTEROLOGY ADULT REF PROCEDURE ONLY,         Ferritin, Vitamin B12, Folate, Iron and iron         binding capacity        Check labs also  Patient to call about the scope exam scheduling    (Z12.11) Screen for colon cancer  Comment: as above   Plan: GASTROENTEROLOGY ADULT REF PROCEDURE ONLY             (R35.0) Urinary frequency  Comment: check labs  Plan: *UA reflex to Microscopic and Culture ([facility] (except [place] and         [place]), CANCELED: *UA reflex to Microscopic         and Culture ([facility] (except        [place] and  [place])             (E87.5) Hyperkalemia  Comment: K a little high last time   Plan: Potassium             (Z12.5) Screening for prostate cancer  Comment: psa   Plan: Prostate spec antigen screen               I reviewed the patient's medications, allergies, medical history, family history, and social history.    Dayday Alberts MD

## 2017-07-21 NOTE — PATIENT INSTRUCTIONS
We will notify you of lab results    We will add the upper endoscopy in addition to the colonoscopy; they can do both on same day

## 2017-07-21 NOTE — MR AVS SNAPSHOT
After Visit Summary   7/21/2017    Bhaskar Ott    MRN: [id number]           Patient Information     Date Of Birth          1940        Visit Information        Provider Department      7/21/2017 7:40 AM Dayday Alberts MD [facility]        Today's Diagnoses     Screen for colon cancer    -  1    Anemia, unspecified type        Abdominal pain, epigastric        Urinary frequency        Hyperkalemia        Screening for prostate cancer          Care Instructions    We will notify you of lab results    We will add the upper endoscopy in addition to the colonoscopy; they can do both on same day              Follow-ups after your visit        Additional Services     GASTROENTEROLOGY ADULT REF PROCEDURE ONLY       Last Lab Result: Creatinine (mg/dL)       Date                     Value                 06/23/2017               1.14             ----------  Body mass index is 24.37 kg/(m^2).     Needed:  No  Language:  English    Patient will be contacted to schedule procedure.     Please be aware that coverage of these services is subject to the terms and limitations of your health insurance plan.  Call member services at your health plan with any benefit or coverage questions.  Any procedures must be performed at a [facility] facility OR coordinated by your clinic's referral office.    Please bring the following with you to your appointment:    (1) Any X-Rays, CTs or MRIs which have been performed.  Contact the facility where they were done to arrange for  prior to your scheduled appointment.    (2) List of current medications   (3) This referral request   (4) Any documents/labs given to you for this referral        NOTE WE HAD PREVIOUSLY ORDERED COLONOSCOPY BUT WE NOW WANT TO HAVE EGD DONE ALSO. THUS PLEASE SCHEDULE BOTH UPPER AND LOWER SCOPE EXAMS                  Your next 10 appointments already scheduled     Aug 01, 2017   Procedure with Neo Whitfield  "Duane, MD   [facility] (--)    [address] [place] [id number] [phone]            Apr 09, 2018 10:50 AM CDT   XR THORACIC/LUMBAR STANDING 2 VIEWS (SCOLI) with UCXR4   [facility] Imaging [facility] Xray ([facility] [place])    [address]  1st Floor  [facility] [id number] [phone]           Please bring a list of your current medicines to your exam. (Include vitamins, minerals and over-thecounter medicines.) Leave your valuables at home.  Tell your doctor if there is a chance you may be pregnant.  You do not need to do anything special for this exam.            Apr 09, 2018 11:15 AM CDT   (Arrive by 11:00 AM)   Return Visit with Saul Bettencourt MD   [facility] Neurosurgery ([facility])    [address]  [address] [facility] [id number] [phone]              Who to contact     If you have questions or need follow up information about today's clinic visit or your schedule please contact [facility] directly at [phone].  Normal or non-critical lab and imaging results will be communicated to you by MyChart, letter or phone within 4 business days after the clinic has received the results. If you do not hear from us within 7 days, please contact the clinic through MyChart or phone. If you have a critical or abnormal lab result, we will notify you by phone as soon as possible.  Submit refill requests through [employer] or call your pharmacy and they will forward the refill request to us. Please allow 3 business days for your refill to be completed.          Additional Information About Your Visit        [employer] Information     [employer]be lets you send messages to your doctor, view your test results, renew your prescriptions, schedule appointments and more. To sign up, go to [email or web address] . Click on \"Log in\" on the left side of the screen, which will take you to the Welcome page. Then " "click on \"Sign up Now\" on the right side of the page.     You will be asked to enter the access code listed below, as well as some personal information. Please follow the directions to create your username and password.     Your access code is: [email or web address]  Expires: 2017  4:51 PM     Your access code will  in 90 days. If you need help or a new code, please call your [facility] clinic or [phone].        Care EveryWhere ID     This is your Care EveryWhere ID. This could be used by other organizations to access your [facility] medical records  [id number]        Your Vitals Were     Pulse Temperature Pulse Oximetry BMI (Body Mass Index)          61 98.1  F (36.7  C) (Oral) 99% 24.37 kg/m2         Blood Pressure from Last 3 Encounters:   17 104/56   17 120/78   04/10/17 115/64    Weight from Last 3 Encounters:   17 133 lb 4 oz (60.4 kg)   17 133 lb (60.3 kg)   16 134 lb (60.8 kg)              We Performed the Following     *UA reflex to Microscopic and Culture ([place] and [facility] (except [place] and [place])     Ferritin     Folate     GASTROENTEROLOGY ADULT REF PROCEDURE ONLY     Iron and iron binding capacity     Potassium     Prostate spec antigen screen     Vitamin B12        Primary Care Provider Office Phone # Fax #    Camille Heard [phone]       [facility] [address]  [place] [address]        Equal Access to Services     DAVID BENITEZ AH: Hadii aad ku hadasho Soomaali, waaxda luqadaha, qaybta kaalmada adeegyada, alesia coronado. So [facility] [phone].    ATENCIÓN: Si habla español, tiene a bhatti disposición servicios gratuitos de asistencia lingüística. Llame al [phone].    We comply with applicable federal civil rights laws and [place] laws. We do not discriminate on the basis of race, color, national origin, age, disability sex, sexual orientation or gender identity.          "   Thank you!     Thank you for choosing [facility]  for your care. Our goal is always to provide you with excellent care. Hearing back from our patients is one way we can continue to improve our services. Please take a few minutes to complete the written survey that you may receive in the mail after your visit with us. Thank you!             Your Updated Medication List - Protect others around you: Learn how to safely use, store and throw away your medicines at www.disposemymeds.org.          This list is accurate as of: 7/21/17  7:58 AM.  Always use your most recent med list.                   Brand Name Dispense Instructions for use Diagnosis    amLODIPine 5 MG tablet    NORVASC    30 tablet    Take 1 tablet (5 mg) by mouth daily    Benign essential hypertension       B-12 1000 MCG Tbcr     100 tablet    Take 1,000 mcg by mouth daily    Cognitive impairment       BOOST BREEZE Liqd     90 each    Take 1 Can by mouth 3 times daily (with meals)    Cachexia (H)       calcium carb 1250 mg (500 mg Ruby)/vitamin D 200 units 500-200 MG-UNIT per tablet    OSCAL with D    90 tablet    Take 1 tablet by mouth daily    Chronic lower back pain       cholecalciferol 1000 UNIT tablet    vitamin D    90 tablet    Take 1 tablet (1,000 Units) by mouth daily    Vitamin D deficiency       ferrous gluconate 325 (36 FE) MG Tabs     180 tablet    Take 1 tablet by mouth 2 times daily    Iron deficiency anemia, unspecified       finasteride 5 MG tablet    PROSCAR    90 tablet    Take 1 tablet (5 mg) by mouth daily    Benign non-nodular prostatic hyperplasia with lower urinary tract symptoms       MULTIvitamin  S Caps     90 capsule    Take 1 tablet by mouth daily    Iron deficiency anemia, unspecified       omeprazole 20 MG CR capsule    priLOSEC    90 capsule    Take 1 capsule (20 mg) by mouth 2 times daily    Gastroesophageal reflux disease without esophagitis       oxybutynin 5 MG tablet    DITROPAN    90 tablet     Take 1 tablet (5 mg) by mouth 3 times daily    Overactive bladder       oxyCODONE 5 MG IR tablet    ROXICODONE    80 tablet    Take 1 tablet (5 mg) by mouth every 12 hours as needed for moderate to severe pain    Chronic pain syndrome, Spinal stenosis in cervical region       polyethylene glycol Packet    MIRALAX/GLYCOLAX    30 packet    Take 34 g by mouth daily    Thoracic spondylosis with myelopathy       pravastatin 40 MG tablet    PRAVACHOL    90 tablet    Take 1 tablet (40 mg) by mouth daily    Pure hypercholesterolemia       senna-docusate 8.6-50 MG per tablet    SENOKOT-S;PERICOLACE    60 tablet    Take 1 tablet by mouth 2 times daily    Chronic pain syndrome       tamsulosin 0.4 MG capsule    FLOMAX    180 capsule    Take 1 capsule (0.4 mg) by mouth two times daily    Benign non-nodular prostatic hyperplasia with lower urinary tract symptoms

## 2017-07-22 ASSESSMENT — ANXIETY QUESTIONNAIRES: GAD7 TOTAL SCORE: 1

## 2017-07-22 ASSESSMENT — PATIENT HEALTH QUESTIONNAIRE - PHQ9: SUM OF ALL RESPONSES TO PHQ QUESTIONS 1-9: 4

## 2017-07-24 NOTE — PROGRESS NOTES
Your potassium level is now now normal.    The other labs listed here are okay.    Dayday Alberts MD

## 2017-07-27 DIAGNOSIS — N40.1 BENIGN NON-NODULAR PROSTATIC HYPERPLASIA WITH LOWER URINARY TRACT SYMPTOMS: ICD-10-CM

## 2017-07-27 RX ORDER — TAMSULOSIN HYDROCHLORIDE 0.4 MG/1
0.4 CAPSULE ORAL
Qty: 180 CAPSULE | Refills: 0 | Status: SHIPPED | OUTPATIENT
Start: 2017-07-27 | End: 2017-10-17

## 2017-07-27 NOTE — TELEPHONE ENCOUNTER
Reason for Call:  Medication or medication refill:    Do you use a [facility]?  Name of the pharmacy and phone number for the current request:  See above     Name of the medication requested: tamsulosin (FLOMAX) 0.4 MG capsule    Other request: Wanting to get a refill on the medication above     Can we leave a detailed message on this number? YES    Phone number patient can be reached at: Home number on file [phone] (home)    Best Time: Anytime       Call taken on 7/27/2017 at 10:44 AM by Greer Lindsay

## 2017-07-27 NOTE — TELEPHONE ENCOUNTER
flomax         Last Written Prescription Date: 4/6/17   Last Fill Quantity: 180, # refills: 0    Last Office Visit with [facility], P or [facility] prescribing provider:  7/21/17   Future Office Visit:      BP Readings from Last 3 Encounters:   07/21/17 104/56   06/23/17 120/78   04/10/17 115/64   Routing refill request to provider for review/approval because:  tamsulosin (FLOMAX) 0.4 MG capsule       Frequency of 2 doses/day exceeds recommended maximum of 1 doses/day    Gretchen Marroquin RN  [facility]

## 2017-08-01 DIAGNOSIS — K21.9 GASTROESOPHAGEAL REFLUX DISEASE WITHOUT ESOPHAGITIS: ICD-10-CM

## 2017-08-01 NOTE — TELEPHONE ENCOUNTER
Request for medication refill:    Date of last visit at clinic: 7/21/2017    Please complete refill if appropriate and CLOSE ENCOUNTER.    Closing the encounter signifies the refill is complete.    If refill has been denied, please complete the smart phrase .smirefuse and route it to the [facility] RN TRIAGE pool to inform the patient and the pharmacy.    Heather Phillips MA

## 2017-08-02 NOTE — TELEPHONE ENCOUNTER
Unable to route to PCP. Called pharmacy to advise request should be sent elsewhere.    Purvi Bartlett RN

## 2017-08-03 DIAGNOSIS — K21.9 GASTROESOPHAGEAL REFLUX DISEASE WITHOUT ESOPHAGITIS: ICD-10-CM

## 2017-08-03 NOTE — TELEPHONE ENCOUNTER
Reason for Call:  Medication or medication refill:    Do you use a [facility]?  Name of the pharmacy and phone number for the current request:  [facility], pended    Name of the medication requested: omeprazole (PRILOSEC) 20 MG capsule    Other request: patient calling for refill    Can we leave a detailed message on this number? YES    Phone number patient can be reached at: Home number on file [phone] (home)    Best Time: any    Call taken on 8/3/2017 at 12:43 PM by Yael Melissa    omeprazole (PRILOSEC) 20 MG capsule      Last Written Prescription Date: 4-11-16  Last Fill Quantity: 90,  # refills: 3   Last Office Visit with G, P or [facility] prescribing provider: 7-21-17

## 2017-08-04 NOTE — TELEPHONE ENCOUNTER
Prescription approved per [facility] Refill Protocol.  Lilibeth Botello, RN [facility] Triage.

## 2017-09-05 DIAGNOSIS — K21.9 GASTROESOPHAGEAL REFLUX DISEASE WITHOUT ESOPHAGITIS: ICD-10-CM

## 2017-09-06 NOTE — TELEPHONE ENCOUNTER
omeprazole (PRILOSEC) 20 MG CR capsule      Last Written Prescription Date: 8-4-17  Last Fill Quantity: 90,  # refills: 0   Last Office Visit with G, P or [facility] prescribing provider: 7-21-17                                         Next 5 appointments (look out 90 days)     Sep 07, 2017  8:00 AM CDT   [facility]ts [facility]    [address] [place] [address] [phone]

## 2017-09-06 NOTE — TELEPHONE ENCOUNTER
Prescription approved per [facility] Refill Protocol.    Tata Lopez RN  [facility]

## 2017-09-07 ENCOUNTER — OFFICE VISIT (OUTPATIENT)
Dept: FAMILY MEDICINE | Facility: CLINIC | Age: 77
End: 2017-09-07
Payer: COMMERCIAL

## 2017-09-07 VITALS
TEMPERATURE: 98.7 F | BODY MASS INDEX: 24.23 KG/M2 | OXYGEN SATURATION: 100 % | HEART RATE: 100 BPM | SYSTOLIC BLOOD PRESSURE: 130 MMHG | DIASTOLIC BLOOD PRESSURE: 80 MMHG | WEIGHT: 132.5 LBS

## 2017-09-07 DIAGNOSIS — R06.00 DYSPNEA, UNSPECIFIED TYPE: ICD-10-CM

## 2017-09-07 DIAGNOSIS — Z12.11 SCREEN FOR COLON CANCER: ICD-10-CM

## 2017-09-07 DIAGNOSIS — R35.0 URINARY FREQUENCY: ICD-10-CM

## 2017-09-07 DIAGNOSIS — M54.2 NECK PAIN: Primary | ICD-10-CM

## 2017-09-07 LAB
ALBUMIN UR-MCNC: NEGATIVE MG/DL
APPEARANCE UR: CLEAR
BILIRUB UR QL STRIP: NEGATIVE
COLOR UR AUTO: YELLOW
GLUCOSE UR STRIP-MCNC: NEGATIVE MG/DL
HGB UR QL STRIP: NEGATIVE
KETONES UR STRIP-MCNC: NEGATIVE MG/DL
LEUKOCYTE ESTERASE UR QL STRIP: NEGATIVE
NITRATE UR QL: NEGATIVE
PH UR STRIP: 5.5 PH (ref 5–7)
SOURCE: NORMAL
SP GR UR STRIP: 1.02 (ref 1–1.03)
UROBILINOGEN UR STRIP-ACNC: 0.2 EU/DL (ref 0.2–1)

## 2017-09-07 PROCEDURE — 81003 URINALYSIS AUTO W/O SCOPE: CPT | Performed by: FAMILY MEDICINE

## 2017-09-07 PROCEDURE — 99213 OFFICE O/P EST LOW 20 MIN: CPT | Performed by: FAMILY MEDICINE

## 2017-09-07 ASSESSMENT — PAIN SCALES - GENERAL: PAINLEVEL: EXTREME PAIN (8)

## 2017-09-07 NOTE — NURSING NOTE
"Chief Complaint   Patient presents with     [facility] F/U     Health Maintenance       Initial /80 (BP Location: Left arm, Patient Position: Chair, Cuff Size: Adult Regular)  Pulse 100  Temp 98.7  F (37.1  C) (Oral)  Wt 132 lb 8 oz (60.1 kg)  SpO2 100%  BMI 24.23 kg/m2 Estimated body mass index is 24.23 kg/(m^2) as calculated from the following:    Height as of 4/10/17: 5' 2\" (1.575 m).    Weight as of this encounter: 132 lb 8 oz (60.1 kg).  Medication Reconciliation: complete   Mariann Heard CMA      "

## 2017-09-07 NOTE — PROGRESS NOTES
SUBJECTIVE:   Bhaskar Ott is a 76 year old male who presents to clinic today for the following health issues:       Patient has been seen for several visits through the ER both [facility]    none    Problem list and histories reviewed & adjusted, as indicated.  Additional history: as documented         Reviewed and updated as needed this visit by clinical staff  Tobacco  Allergies  Meds  Problems  Med Hx  Surg Hx  Fam Hx  Soc Hx        Reviewed and updated as needed this visit by Provider          shoulder pain    One time thought was having heart attack    Dizziness      Has appointment with orthopedic clinic on th 12th    Seeing pain clinic currently    Physical Exam   Constitutional: He is oriented to person, place, and time and well-developed, well-nourished, and in no distress. No distress.   HENT:   Head: Normocephalic and atraumatic.   Eyes: Conjunctivae are normal.   Neck: Carotid bruit is not present.   Cardiovascular: Normal rate, regular rhythm, normal heart sounds and intact distal pulses.  Exam reveals no gallop and no friction rub.    No murmur heard.  Pulmonary/Chest: Effort normal and breath sounds normal. No respiratory distress. He has no wheezes. He has no rales.   Musculoskeletal: He exhibits no edema.   Neurological: He is alert and oriented to person, place, and time.   Skin: He is not diaphoretic.   Psychiatric: Mood and affect normal.     Some mild subj soreness over left neck and trapezius area    Range of motion of shoulder okay     ASSESSMENT / PLAN:  (M54.2) Neck pain  (primary encounter diagnosis)  Comment: of note, patient's pain in neck/ shoulder area has a somewhat exertional component to it.  Emergency room record shows normal ekg but prudent to make sure heart okay so ordered lexiscan stress test.  If this comes back normal, then patient to work on increasing exercise aerobic and work on stretching/ strengthening etc.   Plan: NM Lexiscan stress test              (R06.00) Dyspnea, unspecified type  Comment: as above   Plan: NM Lexiscan stress test             (R35.0) Urinary frequency  Comment: ua today is normal   Plan: follow up prn     (Z12.11) Screen for colon cancer  Comment: I did remind patient to reschedule the upper and lower scope exams  Plan: as above    Also patient did try some lidocaine on the sore area.  I advised him he could use over the counter lidocaine containing products prn but that these are not covered by insurance in my experience.       I reviewed the patient's medications, allergies, medical history, family history, and social history.    Dayday Alberts MD

## 2017-09-07 NOTE — MR AVS SNAPSHOT
After Visit Summary   9/7/2017    Bhaskar Ott    MRN: [id number]           Patient Information     Date Of Birth          1940        Visit Information        Provider Department      9/7/2017 8:00 AM Dayday Alberts MD [facility]        Today's Diagnoses     Neck pain    -  1    Dyspnea, unspecified type          Care Instructions    We will schedule the heart stress test     Call and schedule the scope exams              Follow-ups after your visit        Your next 10 appointments already scheduled     Sep 12, 2017  4:00 PM CDT   (Arrive by 3:45 PM)   New Patient Visit with Carol Shell PA-C   [facility] ([facility])    [address]  3rd Floor  [facility] [id number] [phone]            Sep 22, 2017  9:30 AM CDT   NM INJECTION with MGNMINJ1   [facility])    [id number] [place] [address]   [phone]            Sep 22, 2017 10:15 AM CDT   NM SCAN3 with MGNM1   [facility])    [id number] [place] [address]   [phone]            Sep 22, 2017 10:45 AM CDT   Ekg Stress Nm Lexiscan with MG STRESS RM, MG IMAGING NURSE, MG CARD, MG CV TECH   [facility])    [id number] [place] [address]   [phone]            Sep 22, 2017 11:15 AM CDT   NM MPI WITH LEXISCAN with MGNM1   [facility])    [id number] [place] [address]   [phone]           For a ONE day exam: Allow 3-4 hours for test. For a TWO day exam: Allow 2 hours PER day for test.  You may need to stop some medicines before the test. Follow your doctor s orders. - If you take a beta blocker: Follow your doctor s specific instructions on taking it prior to and on the day of your exam. - If you take  Aggrenox or dipyridamole (Persantine, Permole), stop taking it 48 hours before your test. - If you take Viagra, Cialis or Levitra, stop taking it 48 hours before your test. - If you take theophylline or aminophylline, stop taking it 12 hours before your test.  For patients with diabetes: - If you take insulin, call your diabetes care team. Ask if you should take a 1/2 dose the morning of your test. - If you take diabetes medicine by mouth, don t take it on the morning of your test. Bring it with you to take after the test. (If you have questions, call your diabetes care team.)  Do not take nitrates on the day of your test. Do not wear your Nitro-Patch.  Stop all caffeine 12 hours before the test. This includes coffee, tea, soda pop, chocolate and certain medicines (such as Anacin, Excedrin and NoDoz). Also avoid decaf coffee and tea, as these contain small amounts of caffeine.  No alcohol, smoking or other tobacco for 12 hours before the test.  Stop eating 3 hours before the test. You may drink water.  Please wear a loose two-piece outfit. If you will have an exercise test, bring rubber-soled walking shoes.  When you arrive, please tell us if you: - Have diabetes - Are breastfeeding - May be pregnant - Have a pacemaker of ICD (implantable defibrillator).  Please call your Imaging Department at your exam site with any questions.            Apr 09, 2018 10:50 AM CDT   XR THORACIC/LUMBAR STANDING 2 VIEWS (SCOLI) with UCXR4   [facility] ([facility])    [address] [address] [phone]           Please bring a list of your current medicines to your exam. (Include vitamins, minerals and over-thecounter medicines.) Leave your valuables at home.  Tell your doctor if there is a chance you may be pregnant.  You do not need to do anything special for this exam.            Apr 09, 2018 11:15 AM CDT   (Arrive by 11:00 AM)   Return Visit with  "Saul Bettencourt MD   [facility] Neurosurgery ([facility])    [address]  3rd Floor  [facility] [address] [phone]              Future tests that were ordered for you today     Open Future Orders        Priority Expected Expires Ordered    NM Lexiscan stress test Routine  2018            Who to contact     If you have questions or need follow up information about today's clinic visit or your schedule please contact [facility] directly at [phone].  Normal or non-critical lab and imaging results will be communicated to you by PushCoinhart, letter or phone within 4 business days after the clinic has received the results. If you do not hear from us within 7 days, please contact the clinic through BrightLinet or phone. If you have a critical or abnormal lab result, we will notify you by phone as soon as possible.  Submit refill requests through Posh Eyes or call your pharmacy and they will forward the refill request to us. Please allow 3 business days for your refill to be completed.          Additional Information About Your Visit        Posh Eyes Information     Posh Eyes lets you send messages to your doctor, view your test results, renew your prescriptions, schedule appointments and more. To sign up, go to [email or web address] . Click on \"Log in\" on the left side of the screen, which will take you to the Welcome page. Then click on \"Sign up Now\" on the right side of the page.     You will be asked to enter the access code listed below, as well as some personal information. Please follow the directions to create your username and password.     Your access code is: [email or web address]  Expires: 2017  4:51 PM     Your access code will  in 90 days. If you need help or a new code, please call your [facility] or [phone].        Care EveryWhere ID     This is your Care EveryWhere ID. This could be used by other organizations to access " your [place] medical records  [id number]        Your Vitals Were     Pulse Temperature Pulse Oximetry BMI (Body Mass Index)          100 98.7  F (37.1  C) (Oral) 100% 24.23 kg/m2         Blood Pressure from Last 3 Encounters:   09/07/17 130/80   07/21/17 104/56   06/23/17 120/78    Weight from Last 3 Encounters:   09/07/17 132 lb 8 oz (60.1 kg)   07/21/17 133 lb 4 oz (60.4 kg)   06/23/17 133 lb (60.3 kg)               Primary Care Provider Office Phone # Fax #    Camille Heard [phone]       [facility] [address] [address]        Equal Access to Services     DAVID BENITEZ : Hadii aad ku hadasho Soomaali, waaxda luqadaha, qaybta kaalmada adeegyada, alesia frye . So [facility] [phone].    ATENCIÓN: Si habla español, tiene a bhatti disposición servicios gratuitos de asistencia lingüística. Llame al [phone].    We comply with applicable federal civil rights laws and [place] laws. We do not discriminate on the basis of race, color, national origin, age, disability sex, sexual orientation or gender identity.            Thank you!     Thank you for choosing [facility]  for your care. Our goal is always to provide you with excellent care. Hearing back from our patients is one way we can continue to improve our services. Please take a few minutes to complete the written survey that you may receive in the mail after your visit with us. Thank you!             Your Updated Medication List - Protect others around you: Learn how to safely use, store and throw away your medicines at www.disposemymeds.org.          This list is accurate as of: 9/7/17  9:43 AM.  Always use your most recent med list.                   Brand Name Dispense Instructions for use Diagnosis    amLODIPine 5 MG tablet    NORVASC    30 tablet    Take 1 tablet (5 mg) by mouth daily    Benign essential hypertension       B-12 1000 MCG Tbcr     100  tablet    Take 1,000 mcg by mouth daily    Cognitive impairment       BOOST BREEZE Liqd     90 each    Take 1 Can by mouth 3 times daily (with meals)    Cachexia (H)       calcium carb 1250 mg (500 mg [place])/vitamin D 200 units 500-200 MG-UNIT per tablet    OSCAL with D    90 tablet    Take 1 tablet by mouth daily    Chronic lower back pain       cholecalciferol 1000 UNIT tablet    vitamin D    90 tablet    Take 1 tablet (1,000 Units) by mouth daily    Vitamin D deficiency       ferrous gluconate 325 (36 FE) MG Tabs     180 tablet    Take 1 tablet by mouth 2 times daily    Iron deficiency anemia, unspecified       finasteride 5 MG tablet    PROSCAR    90 tablet    Take 1 tablet (5 mg) by mouth daily    Benign non-nodular prostatic hyperplasia with lower urinary tract symptoms       MULTIvitamin  S Caps     90 capsule    Take 1 tablet by mouth daily    Iron deficiency anemia, unspecified       omeprazole 20 MG CR capsule    priLOSEC    180 capsule    TAKE 1 CAPSULE (20 MG) BY MOUTH 2 TIMES DAILY    Gastroesophageal reflux disease without esophagitis       oxybutynin 5 MG tablet    DITROPAN    90 tablet    Take 1 tablet (5 mg) by mouth 3 times daily    Overactive bladder       oxyCODONE 5 MG IR tablet    ROXICODONE    80 tablet    Take 1 tablet (5 mg) by mouth every 12 hours as needed for moderate to severe pain    Chronic pain syndrome, Spinal stenosis in cervical region       polyethylene glycol Packet    MIRALAX/GLYCOLAX    30 packet    Take 34 g by mouth daily    Thoracic spondylosis with myelopathy       pravastatin 40 MG tablet    PRAVACHOL    90 tablet    Take 1 tablet (40 mg) by mouth daily    Pure hypercholesterolemia       senna-docusate 8.6-50 MG per tablet    SENOKOT-S;PERICOLACE    60 tablet    Take 1 tablet by mouth 2 times daily    Chronic pain syndrome       tamsulosin 0.4 MG capsule    FLOMAX    180 capsule    Take 1 capsule (0.4 mg) by mouth two times daily    Benign non-nodular prostatic hyperplasia  with lower urinary tract symptoms

## 2017-09-12 ENCOUNTER — OFFICE VISIT (OUTPATIENT)
Dept: NEUROSURGERY | Facility: CLINIC | Age: 77
End: 2017-09-12

## 2017-09-12 VITALS
HEART RATE: 72 BPM | WEIGHT: 134 LBS | SYSTOLIC BLOOD PRESSURE: 109 MMHG | DIASTOLIC BLOOD PRESSURE: 61 MMHG | BODY MASS INDEX: 23.74 KG/M2 | HEIGHT: 63 IN

## 2017-09-12 DIAGNOSIS — M54.12 LEFT CERVICAL RADICULOPATHY: Primary | ICD-10-CM

## 2017-09-12 ASSESSMENT — PAIN SCALES - GENERAL: PAINLEVEL: SEVERE PAIN (7)

## 2017-09-12 NOTE — LETTER
9/12/2017       RE: Bhaskar Ott  [address]    [facility] [address]     Dear Colleague,    Thank you for referring your patient, Bhaskar Ott, to the [facility] NEUROSURGERY at [facility]. Please see a copy of my visit note below.      Neurosurgery Clinic   Date of Visit: 9/12/2017  Referred for:  Left neck and shoulder pain  Referring Provider: Self    Procedure 8/27/15   DIAGNOSIS: Flat back syndrome, thoracic myelopathy.      PROCEDURES PERFORMED:   1. Removal of posterior instrumentation, T7 through L2 and removal of instrumentation from L5 through S1.   2. Replacement of pedicle screws at T7, T8, T11, L1, L2 and L5 bilaterally.   3. Stealth O-arm guided placement of bilateral pedicle screws at T3, T4, T5, T6, L4, L5, ilium (S1AI trajectory)  4. L3 pedicle subtraction osteotomy   5. T6 Chevron (Smith/Edwards) osteotomy.  6. Posterior arthrodesis from T3 through T7 with local autograft.  7. Placement of uHssain-Scot tongs.   8. Intraoperative neural monitoring.      STAFF SURGEON: Saul Bettencourt MD      COSURGEON: Ayo Mcdaniels MD      ASSISTANTS:   1. Rosales Hill MD   2. Pamela Billingsley MD.      IMPLANTS: Placement of Synthes pedicle screws and cobalt chromium rods.      INDICATIONS FOR PROCEDURE: Bhaskar Ott is a 74-year-old gentleman who presented with symptoms of thoracic myelopathy and also had history of flat back syndrome. He had previously undergone a T7 through L2 fusion. Initially he had developed a T8 compression fracture and had his posterior spine operation performed and then later had extension of his fusion to T7. He also has history of an L5 through S1 fusion with postoperative left L4/L5 distribution pain and paresthesiae. 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 Given his symptoms and imaging  findings, he was brought to the operating room for the above-mentioned procedure to correct his spinal alignment and to decompress his thoracic spine at the T6 level. Because of the complexity of the case that would require laminectomy and osteotomies, it was decided that the most appropriate strategy to decrease operative time and the risk of complications would be to perform co-surgery with Dr. Mcdaniels.         This gentleman previously underwent surgery with Dr. Mcdaniels and Dr. Bettencorut in August 2015, for flat back, worsening back pain, and thoracic myelopathy. He recovered reasonably well postop but had some residual left hip and groin pain, Dr. Mcdaniels had recommended a left total hip arthroplasty. The patient has not pursued that. Ultimately Dr. Mcdaniels left the university setting, Dr. Bettencourt has been following him along since then.    He was last seen here in April of this year by Dr. Bettencourt for complaints of pain in the right shoulder, neck, back and left leg and hip.CT scans of his fused spine showed good arthrodesis below approximately T6, but some signs of haloing from T3 to T5. There did not seem to be obvious malalignment. Because of his neck pain it was recommended that he get repeat MRIs and flexion-extension x-rays of the neck as well as standing scoliosis films. Those were done, the cervical MRI did show some stenosis, but flexion-extension showed no obvious instability. At that point there were no surgical recommendations, and he was recommended to return in one year with standing scoliosis films.    He presents to clinic today with complaints of neck back and shoulder pain. It moved from the right to the left and is described as bolts of pain into the left trapezius area which hits randomly and for which he has been to the emergency room at least twice. Once at [facility], and once at [facility].  Apparently he had imaging at both of those places. That imaging is not in our system today. He was very  irritated with me for not discovering this before now. He is extremely reluctant to describe the pain, giving an appearance of irritation and impatience that I am asking him to describe his symptoms, telling me I should know what is wrong with him already. He did answer questions, but reluctantly, and did so in one-word answers.  It would take 5 or 6 questions to get a full description of any part of a symptom. Eventually I did ask him if he was unhappy about being seen here today, and he indicated he would much rather see Dr. Bettencourt, and felt as though his insurance has forced him to see a PA instead.     I explained that Dr. Bettencourt's clinic schedule is rather full, and so I see patients in order to evaluate them in as timely a manner as possible. I offered to continue the appointment, and expressed that he were a bit more forthcoming and cooperative we could probably make some progress here, and I might be able to help him feel better.   Or I could reschedule him with Dr. Bettencourt, and he expressed strong preference to be seen by Dr. Bettencourt instead. I will send a message to our scheduling staff to arrange that.          Carol Shell PA-C  [facility]  Department of Neurosurgery  Phone: [phone]  Fax: [phone]      This note was generated using voice recognition software. While edited for content some inaccurate phrasing may be found.    Again, thank you for allowing me to participate in the care of your patient.      Sincerely,    Carol Shell PA-C

## 2017-09-12 NOTE — NURSING NOTE
Chief Complaint   Patient presents with     Consult     New onset in the past 2 weeks of neck, back, and shoulder pain    Branden Zaragoza CMA

## 2017-09-12 NOTE — PROGRESS NOTES
Neurosurgery Clinic   Date of Visit: 9/12/2017  Referred for:  Left neck and shoulder pain  Referring Provider: Self    Procedure 8/27/15   DIAGNOSIS: Flat back syndrome, thoracic myelopathy.      PROCEDURES PERFORMED:   1. Removal of posterior instrumentation, T7 through L2 and removal of instrumentation from L5 through S1.   2. Replacement of pedicle screws at T7, T8, T11, L1, L2 and L5 bilaterally.   3. Stealth O-arm guided placement of bilateral pedicle screws at T3, T4, T5, T6, L4, L5, ilium (S1AI trajectory)  4. L3 pedicle subtraction osteotomy   5. T6 Chevron (Smith/Edwards) osteotomy.  6. Posterior arthrodesis from T3 through T7 with local autograft.  7. Placement of [place] tongs.   8. Intraoperative neural monitoring.      STAFF SURGEON: Saul Bettencourt MD      COSURGEON: Ayo Mcdaniels MD      ASSISTANTS:   1. Rosales Hill MD   2. Pamela Billingsley MD.      IMPLANTS: Placement of Synthes pedicle screws and cobalt chromium rods.      INDICATIONS FOR PROCEDURE: Bhaskar Ott is a 74-year-old gentleman who presented with symptoms of thoracic myelopathy and also had history of flat back syndrome. He had previously undergone a T7 through L2 fusion. Initially he had developed a T8 compression fracture and had his posterior spine operation performed and then later had extension of his fusion to T7. He also has history of an L5 through S1 fusion with postoperative left L4/L5 distribution pain and paresthesiae. 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 Given his symptoms and imaging findings, he was brought to the operating room for the above-mentioned procedure to correct his spinal alignment and to decompress his thoracic spine at the T6 level. Because of the complexity of the case that would require laminectomy and osteotomies, it was decided that the most appropriate  strategy to decrease operative time and the risk of complications would be to perform co-surgery with Dr. Mcdaniels.         This gentleman previously underwent surgery with Dr. Mcdaniels and Dr. Bettencourt in August 2015, for flat back, worsening back pain, and thoracic myelopathy. He recovered reasonably well postop but had some residual left hip and groin pain, Dr. Mcdaniels had recommended a left total hip arthroplasty. The patient has not pursued that. Ultimately Dr. [facility]cdaniels left the university setting, Dr. Bettencourt has been following him along since then.    He was last seen here in April of this year by Dr. Bettencourt for complaints of pain in the right shoulder, neck, back and left leg and hip.CT scans of his fused spine showed good arthrodesis below approximately T6, but some signs of haloing from T3 to T5. There did not seem to be obvious malalignment. Because of his neck pain it was recommended that he get repeat MRIs and flexion-extension x-rays of the neck as well as standing scoliosis films. Those were done, the cervical MRI did show some stenosis, but flexion-extension showed no obvious instability. At that point there were no surgical recommendations, and he was recommended to return in one year with standing scoliosis films.    He presents to clinic today with complaints of neck back and shoulder pain. It moved from the right to the left and is described as bolts of pain into the left trapezius area which hits randomly and for which he has been to the emergency room at least twice. Once at [facility], and once at [facility].  Apparently he had imaging at both of those places. That imaging is not in our system today. He was very irritated with me for not discovering this before now. He is extremely reluctant to describe the pain, giving an appearance of irritation and impatience that I am asking him to describe his symptoms, telling me I should know what is wrong with him already. He did answer questions, but  reluctantly, and did so in one-word answers.  It would take 5 or 6 questions to get a full description of any part of a symptom. Eventually I did ask him if he was unhappy about being seen here today, and he indicated he would much rather see Dr. Bettencourt, and felt as though his insurance has forced him to see a PA instead.     I explained that Dr. Bettencourt's clinic schedule is rather full, and so I see patients in order to evaluate them in as timely a manner as possible. I offered to continue the appointment, and expressed that he were a bit more forthcoming and cooperative we could probably make some progress here, and I might be able to help him feel better.   Or I could reschedule him with Dr. Bettencourt, and he expressed strong preference to be seen by Dr. Bettencourt instead. I will send a message to our scheduling staff to arrange that.          Carol Shell PA-C  [facility]  Department of Neurosurgery  Phone: [phone]  Fax: [phone]      This note was generated using voice recognition software. While edited for content some inaccurate phrasing may be found.

## 2017-09-12 NOTE — MR AVS SNAPSHOT
After Visit Summary   9/12/2017    Bhaskar Ott    MRN: [id number]           Patient Information     Date Of Birth          1940        Visit Information        Provider Department      9/12/2017 4:00 PM Carol Shell PA-C [facility] Neurosurgery        Today's Diagnoses     Left cervical radiculopathy    -  1       Follow-ups after your visit        Your next 10 appointments already scheduled     Sep 22, 2017  9:30 AM CDT   NM INJECTION with MGNMINJ1   [facility])    [address] [place] [id number]   [phone]            Sep 22, 2017 10:15 AM CDT   NM SCAN3 with MGNM1   [facility])    [address] [place] [address]   [phone]            Sep 22, 2017 10:45 AM CDT   Ekg Stress Nm Lexiscan with MG STRESS RM, MG IMAGING NURSE, MG CARD, MG CV TECH   [facility])    [address] [place] [id number]   [phone]            Sep 22, 2017 11:15 AM CDT   NM MPI WITH LEXISCAN with MGNM1   [facility])    [address] [place] [address]   [phone]           For a ONE day exam: Allow 3-4 hours for test. For a TWO day exam: Allow 2 hours PER day for test.  You may need to stop some medicines before the test. Follow your doctor s orders. - If you take a beta blocker: Follow your doctor s specific instructions on taking it prior to and on the day of your exam. - If you take Aggrenox or dipyridamole (Persantine, Permole), stop taking it 48 hours before your test. - If you take Viagra, Cialis or Levitra, stop taking it 48 hours before your test. - If you take theophylline or aminophylline, stop taking it 12 hours before your test.  For patients with diabetes: - If you take insulin, call your diabetes care team. Ask if you should take a 1/2 dose the  morning of your test. - If you take diabetes medicine by mouth, don t take it on the morning of your test. Bring it with you to take after the test. (If you have questions, call your diabetes care team.)  Do not take nitrates on the day of your test. Do not wear your Nitro-Patch.  Stop all caffeine 12 hours before the test. This includes coffee, tea, soda pop, chocolate and certain medicines (such as Anacin, Excedrin and NoDoz). Also avoid decaf coffee and tea, as these contain small amounts of caffeine.  No alcohol, smoking or other tobacco for 12 hours before the test.  Stop eating 3 hours before the test. You may drink water.  Please wear a loose two-piece outfit. If you will have an exercise test, bring rubber-soled walking shoes.  When you arrive, please tell us if you: - Have diabetes - Are breastfeeding - May be pregnant - Have a pacemaker of ICD (implantable defibrillator).  Please call your Imaging Department at your exam site with any questions.            Apr 09, 2018 10:50 AM CDT   XR THORACIC/LUMBAR STANDING 2 VIEWS (SCOLI) with UCXR4   [facility] ([facility])    [address] [place] [id number] [phone]           Please bring a list of your current medicines to your exam. (Include vitamins, minerals and over-thecounter medicines.) Leave your valuables at home.  Tell your doctor if there is a chance you may be pregnant.  You do not need to do anything special for this exam.            Apr 09, 2018 11:15 AM CDT   (Arrive by 11:00 AM)   Return Visit with Saul Bettencourt MD   [facility] ([facility])    [address] [place] [address] [phone]              Who to contact     Please call your clinic at [phone] to:    Ask questions about your health    Make or cancel appointments    Discuss your medicines    Learn about your test results    Speak to your  "doctor   If you have compliments or concerns about an experience at your clinic, or if you wish to file a complaint, please contact [facility] Patient Relations at [phone] or email us at [email or web address]         Additional Information About Your Visit        saperatechart Information     SentinelOne is an electronic gateway that provides easy, online access to your medical records. With SentinelOne, you can request a clinic appointment, read your test results, renew a prescription or communicate with your care team.     To sign up for SentinelOne visit the website at [email or web address]   You will be asked to enter the access code listed below, as well as some personal information. Please follow the directions to create your username and password.     Your access code is: [email or web address]  Expires: 2017  4:51 PM     Your access code will  in 90 days. If you need help or a new code, please contact your [facility] or call [phone] for assistance.        Care EveryWhere ID     This is your Care EveryWhere ID. This could be used by other organizations to access your [facility] medical records  [id number]        Your Vitals Were     Pulse Height BMI (Body Mass Index)             72 1.588 m (5' 2.5\") 24.12 kg/m2          Blood Pressure from Last 3 Encounters:   17 109/61   17 130/80   17 104/56    Weight from Last 3 Encounters:   17 60.8 kg (134 lb)   17 60.1 kg (132 lb 8 oz)   17 60.4 kg (133 lb 4 oz)              Today, you had the following     No orders found for display       Primary Care Provider Office Phone # Fax #    Dayday Alberts [phone]       [address] [facility] [address]        Equal Access to Services     DAVID BENITEZ : Marcos cokero Sochapincito, waaxda luqadaha, qaybta kaalmaalesia maldonado . So [facility] " [phone].    ATENCIÓN: Si charo mayorga, tiene a bhatti disposición servicios gratuitos de asistencia lingüística. Carly reynolds [phone].    We comply with applicable federal civil rights laws and [place] laws. We do not discriminate on the basis of race, color, national origin, age, disability sex, sexual orientation or gender identity.            Thank you!     Thank you for choosing [facility]  for your care. Our goal is always to provide you with excellent care. Hearing back from our patients is one way we can continue to improve our services. Please take a few minutes to complete the written survey that you may receive in the mail after your visit with us. Thank you!             Your Updated Medication List - Protect others around you: Learn how to safely use, store and throw away your medicines at www.disposemymeds.org.          This list is accurate as of: 9/12/17  5:00 PM.  Always use your most recent med list.                   Brand Name Dispense Instructions for use Diagnosis    amLODIPine 5 MG tablet    NORVASC    30 tablet    Take 1 tablet (5 mg) by mouth daily    Benign essential hypertension       B-12 1000 MCG Tbcr     100 tablet    Take 1,000 mcg by mouth daily    Cognitive impairment       BOOST BREEZE Liqd     90 each    Take 1 Can by mouth 3 times daily (with meals)    Cachexia (H)       calcium carb 1250 mg (500 mg [place])/vitamin D 200 units 500-200 MG-UNIT per tablet    OSCAL with D    90 tablet    Take 1 tablet by mouth daily    Chronic lower back pain       cholecalciferol 1000 UNIT tablet    vitamin D    90 tablet    Take 1 tablet (1,000 Units) by mouth daily    Vitamin D deficiency       ferrous gluconate 325 (36 FE) MG Tabs     180 tablet    Take 1 tablet by mouth 2 times daily    Iron deficiency anemia, unspecified       finasteride 5 MG tablet    PROSCAR    90 tablet    Take 1 tablet (5 mg) by mouth daily    Benign non-nodular prostatic hyperplasia with lower urinary tract  symptoms       MULTIvitamin  S Caps     90 capsule    Take 1 tablet by mouth daily    Iron deficiency anemia, unspecified       omeprazole 20 MG CR capsule    priLOSEC    180 capsule    TAKE 1 CAPSULE (20 MG) BY MOUTH 2 TIMES DAILY    Gastroesophageal reflux disease without esophagitis       oxybutynin 5 MG tablet    DITROPAN    90 tablet    Take 1 tablet (5 mg) by mouth 3 times daily    Overactive bladder       oxyCODONE 5 MG IR tablet    ROXICODONE    80 tablet    Take 1 tablet (5 mg) by mouth every 12 hours as needed for moderate to severe pain    Chronic pain syndrome, Spinal stenosis in cervical region       polyethylene glycol Packet    MIRALAX/GLYCOLAX    30 packet    Take 34 g by mouth daily    Thoracic spondylosis with myelopathy       pravastatin 40 MG tablet    PRAVACHOL    90 tablet    Take 1 tablet (40 mg) by mouth daily    Pure hypercholesterolemia       senna-docusate 8.6-50 MG per tablet    SENOKOT-S;PERICOLACE    60 tablet    Take 1 tablet by mouth 2 times daily    Chronic pain syndrome       tamsulosin 0.4 MG capsule    FLOMAX    180 capsule    Take 1 capsule (0.4 mg) by mouth two times daily    Benign non-nodular prostatic hyperplasia with lower urinary tract symptoms

## 2017-09-21 ENCOUNTER — TELEPHONE (OUTPATIENT)
Dept: FAMILY MEDICINE | Facility: CLINIC | Age: 77
End: 2017-09-21

## 2017-09-21 DIAGNOSIS — M54.50 CHRONIC LOWER BACK PAIN: ICD-10-CM

## 2017-09-21 DIAGNOSIS — M54.5 CHRONIC LOW BACK PAIN, UNSPECIFIED BACK PAIN LATERALITY, WITH SCIATICA PRESENCE UNSPECIFIED: ICD-10-CM

## 2017-09-21 DIAGNOSIS — R06.00 DYSPNEA, UNSPECIFIED TYPE: Primary | ICD-10-CM

## 2017-09-21 DIAGNOSIS — G89.29 CHRONIC LOW BACK PAIN, UNSPECIFIED BACK PAIN LATERALITY, WITH SCIATICA PRESENCE UNSPECIFIED: ICD-10-CM

## 2017-09-21 DIAGNOSIS — G89.29 CHRONIC LOWER BACK PAIN: ICD-10-CM

## 2017-09-21 RX ORDER — ALBUTEROL SULFATE 90 UG/1
2 AEROSOL, METERED RESPIRATORY (INHALATION) EVERY 6 HOURS PRN
Qty: 1 INHALER | Refills: 3 | Status: SHIPPED | OUTPATIENT
Start: 2017-09-21 | End: 2018-01-27

## 2017-09-21 NOTE — TELEPHONE ENCOUNTER
Lidocaine and biofreeze are not covered by insurance.  He can just get these over the counter.  ( the prescription lidocaine products are not covered even with prior authorization.   There are now several lidocaine containing over the counter products available )    I did send in albuterol    Only lab recently was a urinalysis which is normal    Please inform patient    Dayday Alberts MD

## 2017-09-21 NOTE — TELEPHONE ENCOUNTER
Patient added 3rd request for medication:  Biofreeze. Please call to advise patient about lab results done most recently.     Ramón Rabago  Reception

## 2017-09-21 NOTE — TELEPHONE ENCOUNTER
Routing refill request to provider for review/approval because:  Drug not active on patient's medication list  BioFreeze  Lidocaine  Albuterol    Gretchen Marroquin RN  [facility]

## 2017-09-21 NOTE — TELEPHONE ENCOUNTER
Reason for Call:  Medication or medication refill:    Do you use a [facility]?  Name of the pharmacy and phone number for the current request:  [facility] [address]    Name of the medication requested: Refills for Lidocaine and Albuterol    Other request:     Can we leave a detailed message on this number? YES    Phone number patient can be reached at: Home number on file [phone] (home)    Best Time: Any    Call taken on 9/21/2017 at 3:16 PM by Ramón Rabago

## 2017-09-21 NOTE — TELEPHONE ENCOUNTER
Attempted to call patient at home number, left message on answering service requesting call back to clinic to discuss.  Idania Ansari RN  [facility]

## 2017-09-22 PROBLEM — M54.50 CHRONIC LOWER BACK PAIN: Status: ACTIVE | Noted: 2017-09-22

## 2017-09-22 PROBLEM — G89.29 CHRONIC LOWER BACK PAIN: Status: ACTIVE | Noted: 2017-09-22

## 2017-09-22 RX ORDER — LIDOCAINE 50 MG/G
1 PATCH TOPICAL EVERY 12 HOURS PRN
Qty: 30 PATCH | Refills: 0 | Status: SHIPPED | OUTPATIENT
Start: 2017-09-22 | End: 2018-02-13

## 2017-09-22 NOTE — TELEPHONE ENCOUNTER
Attempt # 2  Called patient at home number.  Was call answered?  No, left message to call nurse line    Gretchen Marroquin RN  [facility]

## 2017-09-22 NOTE — TELEPHONE ENCOUNTER
Patient returning call. RN SN took call.    Thank you,  Haydee RONDON.  Patient Rep.  [facility]

## 2017-09-22 NOTE — TELEPHONE ENCOUNTER
Attempt # 1  Called patient at home number.  Was call answered?  Yes, patient states spoke to his insurance company and they told him they would over ride it - he asks that you send the prescriptions through to the pharmacy, explained to patient the process of insurance denial and prior auth and how time consuming that is and insurance companies do not pay anyway - patient states he want Dr. Alberts to send the scripts to pharmacy and he will deal with his insurance company if they deny them.     Gretchen Marroquin RN  [facility]

## 2017-09-22 NOTE — TELEPHONE ENCOUNTER
Patient returned call, I advised him that the albuterol Rx was sent so should be available to .   Also advised him UA result was normal.    I told him Dr. Alberts recommends getting the lidocaine and biofreeze over the counter as insurance won't cover.    Patient reports he called his insurance and they told him to have his PCP and/or his pharmacy contact them to get coverage.  Patient says he is using these products for this neck, back, and right shoulder pain and that he was seen for same issues recently by Dr. Alberts.    I advised him this sounds like a prior auth; I will route to Dr. Alberts to send in Rx's so can begin process when they are denied coverage but will likely not have any answers until next week.    Patient verbalized understanding.    Routed to Dr. Alberts to send Rx's (I cued up as best I could).  Idania Ansari RN  [facility]

## 2017-09-22 NOTE — TELEPHONE ENCOUNTER
I have never had success with prior auth for these products    Not worth it to have our staff spend their time with this    Patient is free to find another primary care doctor if he wishes    Please inform patient    Dayday Alberts MD

## 2017-09-27 ENCOUNTER — TELEPHONE (OUTPATIENT)
Dept: FAMILY MEDICINE | Facility: CLINIC | Age: 77
End: 2017-09-27

## 2017-09-27 DIAGNOSIS — G89.29 CHRONIC LOW BACK PAIN, UNSPECIFIED BACK PAIN LATERALITY, WITH SCIATICA PRESENCE UNSPECIFIED: ICD-10-CM

## 2017-09-27 DIAGNOSIS — M54.5 CHRONIC LOW BACK PAIN, UNSPECIFIED BACK PAIN LATERALITY, WITH SCIATICA PRESENCE UNSPECIFIED: ICD-10-CM

## 2017-09-27 RX ORDER — LIDOCAINE 50 MG/G
1 PATCH TOPICAL EVERY 12 HOURS PRN
Qty: 30 PATCH | Refills: 0 | Status: CANCELLED | OUTPATIENT
Start: 2017-09-27

## 2017-10-02 DIAGNOSIS — E78.00 PURE HYPERCHOLESTEROLEMIA: ICD-10-CM

## 2017-10-02 NOTE — TELEPHONE ENCOUNTER
pravastatin (PRAVACHOL) 40 MG tablet     Last Written Prescription Date: 8-5-16  Last Fill Quantity: 90, # refills: 6  Last Office Visit with [facility], P or [facility] prescribing provider: 9-7-17       Lab Results   Component Value Date    CHOL 171 06/23/2017     Lab Results   Component Value Date    HDL 77 06/23/2017     Lab Results   Component Value Date    LDL 80 06/23/2017     Lab Results   Component Value Date    TRIG 72 06/23/2017     Lab Results   Component Value Date    CHOLHDLRATIO 2.2 11/14/2014

## 2017-10-03 RX ORDER — PRAVASTATIN SODIUM 40 MG
TABLET ORAL
Qty: 90 TABLET | Refills: 0 | Status: SHIPPED | OUTPATIENT
Start: 2017-10-03 | End: 2018-01-04

## 2017-10-03 NOTE — TELEPHONE ENCOUNTER
Prescription approved per [facility] Refill Protocol.  Lilibeth Botello, RN [facility] Triage.

## 2017-10-12 ENCOUNTER — TELEPHONE (OUTPATIENT)
Dept: FAMILY MEDICINE | Facility: CLINIC | Age: 77
End: 2017-10-12

## 2017-10-12 DIAGNOSIS — G57.02 LESION OF SCIATIC NERVE, LEFT: ICD-10-CM

## 2017-10-12 RX ORDER — LIDOCAINE 50 MG/G
OINTMENT TOPICAL
Qty: 142 G | Refills: 11 | Status: SHIPPED | OUTPATIENT
Start: 2017-10-12 | End: 2019-04-02

## 2017-10-12 NOTE — TELEPHONE ENCOUNTER
"Reason for Call:  Medication or medication refill:    Do you use a [facility]?  Name of the [facility]    Name of the medication requested: Lidocaine ointment    Other request: Patient calling to get refill of this medication, did not see \"oinment\" on med list.  (lidocaine (LIDODERM) 5 % Patch) was filled on 9-22-17, but patient was just at the pharmacy and he picked up all medications available for .  Please call patient to inform.    Can we leave a detailed message on this number? YES    Phone number patient can be reached at: Home number on file [phone] (home)    Best Time: any    Call taken on 10/12/2017 at 12:23 PM by Yael Melissa      "

## 2017-10-12 NOTE — TELEPHONE ENCOUNTER
Attempt # 1  Called patient at home number.  Was call answered?  Yes,     patient states he has never used the patches - had numerous tubes of the Lidocaine ointment from 2015 prescription (refills 11) and just used the last tube in September and is requesting a refill of the ointment not the patches. Patient states really needs it. Is not on active medication list - is in history.    Lidocaine (Xylocaine) 5%  Last filled 10/14/14 142 gm refill 11.    Gretchen Marroquin RN  [facility] [place] [facility]

## 2017-10-17 DIAGNOSIS — N40.1 BENIGN NON-NODULAR PROSTATIC HYPERPLASIA WITH LOWER URINARY TRACT SYMPTOMS: ICD-10-CM

## 2017-10-17 RX ORDER — TAMSULOSIN HYDROCHLORIDE 0.4 MG/1
CAPSULE ORAL
Qty: 180 CAPSULE | Refills: 0 | Status: SHIPPED | OUTPATIENT
Start: 2017-10-17 | End: 2018-01-24

## 2017-10-17 NOTE — TELEPHONE ENCOUNTER
tamsulosin (FLOMAX) 0.4 MG capsule         Last Written Prescription Date: 7-27-17  Last Fill Quantity: 180, # refills: 0    Last Office Visit with G, P or [facility] prescribing provider:  9-7-17   Future Office Visit:      BP Readings from Last 3 Encounters:   09/12/17 109/61   09/07/17 130/80   07/21/17 104/56

## 2017-10-18 NOTE — TELEPHONE ENCOUNTER
Prescription approved per [facility] Refill Protocol.    Tata Lopez RN  [facility]

## 2017-10-24 ENCOUNTER — TELEPHONE (OUTPATIENT)
Dept: FAMILY MEDICINE | Facility: CLINIC | Age: 77
End: 2017-10-24

## 2017-10-24 NOTE — TELEPHONE ENCOUNTER
Routed to provider to advise on message below.    Amparo Craig RN  [facility]

## 2017-10-24 NOTE — TELEPHONE ENCOUNTER
No need to hold any of his medications before the stress test.    After stress test if sores not helping then call for appointment to be rechecked in clinic    Please inform patient    Dayday Alberts MD

## 2017-10-24 NOTE — TELEPHONE ENCOUNTER
Called patient and notified him of message below from provider. Patient states sores on his stomach are getting worse. Appointment made to see provider tomorrow.     Amparo Craig RN  [facility]

## 2017-10-24 NOTE — TELEPHONE ENCOUNTER
Reason for Call:  Other     Detailed comments: Patient calling stating that he is scheduled for a stress test on 10/30 and he needs to know if he should hold any of his medications for that test. He also states that he had some sores on his abdomen when he last seen Dr Alberts in clinic. These sores have not healed yet and his PT has been put on hold until these sores heal. Patient is wondering what Dr Alberts advises that he do with these sores. Patient uses [facility] on [address]    Phone Number Patient can be reached at: Home number on file [phone] (home)    Best Time: any    Can we leave a detailed message on this number? YES    Call taken on 10/24/2017 at 11:52 AM by Charlee Vargas

## 2017-10-27 ENCOUNTER — TELEPHONE (OUTPATIENT)
Dept: CARDIOLOGY | Facility: CLINIC | Age: 77
End: 2017-10-27

## 2017-10-27 NOTE — TELEPHONE ENCOUNTER
Called patient to review preparation prior to nuclear stress test (Lexiscan) scheduled for Oct 30 and to assess for any questions or concerns. Reminded patient to remain NPO for 3 hours prior to test with the exception of water, to remain free of caffeine (including decaf, chocolate or Excedrin) for 12 hr, to take all medications as scheduled especially for blood pressure. Pt verbalized understanding, all questions answered.

## 2017-10-30 ENCOUNTER — RADIANT APPOINTMENT (OUTPATIENT)
Dept: NUCLEAR MEDICINE | Facility: CLINIC | Age: 77
End: 2017-10-30
Attending: FAMILY MEDICINE
Payer: COMMERCIAL

## 2017-10-30 ENCOUNTER — OFFICE VISIT (OUTPATIENT)
Dept: CARDIOLOGY | Facility: CLINIC | Age: 77
End: 2017-10-30
Attending: FAMILY MEDICINE
Payer: COMMERCIAL

## 2017-10-30 VITALS — HEART RATE: 63 BPM | SYSTOLIC BLOOD PRESSURE: 92 MMHG | DIASTOLIC BLOOD PRESSURE: 68 MMHG

## 2017-10-30 DIAGNOSIS — R06.00 DYSPNEA, UNSPECIFIED TYPE: ICD-10-CM

## 2017-10-30 DIAGNOSIS — M54.2 NECK PAIN: ICD-10-CM

## 2017-10-30 DIAGNOSIS — R06.00 DYSPNEA: Primary | ICD-10-CM

## 2017-10-30 PROCEDURE — 78452 HT MUSCLE IMAGE SPECT MULT: CPT

## 2017-10-30 PROCEDURE — 93018 CV STRESS TEST I&R ONLY: CPT

## 2017-10-30 PROCEDURE — A9502 TC99M TETROFOSMIN: HCPCS | Performed by: FAMILY MEDICINE

## 2017-10-30 PROCEDURE — 93017 CV STRESS TEST TRACING ONLY: CPT

## 2017-10-30 PROCEDURE — 93016 CV STRESS TEST SUPVJ ONLY: CPT

## 2017-10-30 RX ORDER — REGADENOSON 0.08 MG/ML
0.4 INJECTION, SOLUTION INTRAVENOUS ONCE
Status: COMPLETED | OUTPATIENT
Start: 2017-10-30 | End: 2017-10-30

## 2017-10-30 RX ADMIN — REGADENOSON 0.4 MG: 0.08 INJECTION, SOLUTION INTRAVENOUS at 09:50

## 2017-10-30 NOTE — LETTER
[facility]  [address] [place]  [address]  [phone]        November 3, 2017    Bhaskar Ratliffshira  [address]    [facility] [address]        Dear Bhaskar,    Your heart stress test is normal.     Increase exercise and general activity.     Results for orders placed or performed in visit on 10/30/17   NM Lexiscan stress test    Narrative    INDICATION:  dyspnea.     PROTOCOL:    Rest and stress myocardial perfusion imaging was performed using 10.5  and 36 mCi of Tc-99m tetrofosmin intravenously. Pharmacological stress  was performed with 0.4 mg of Regadenoson intravenously. The EKG showed  normal sinus rhythm with first degree AV block at rest. No significant  abnormalities were noted with infusion of Regadenoson.    FINDINGS:  1. Overall quality of the study: Diagnostic.   2. Left ventricular cavity is Normal on the rest and stress studies.  3. SPECT images demonstrate uniform radiotracer uptake of the  myocardium on both stress and rest images.The summed stress score is  0.   4. Left ventricular ejection fraction is >65%.      Impression    IMPRESSION:  1. Normal myocardial SPECT study with a summed stress score of  0 . A  summed stress score of 0 is associated with an annual event rate of  0.8% and 0.9% for myocardial infarction and cardiac death,  respectively (Hachamovitch. Circulation 1998;98:535-43).  2. Normal left ventricular systolic function with a left ventricular  ejection fraction of  >65%.   3. No prior study available for comparison.    FELICE HEATH MD       If you have any questions please call the clinic at [phone].    Sincerely,    MD SUHAIL DoddL

## 2017-10-30 NOTE — PROGRESS NOTES
IV was started in the right AC with a 22g Jelco catheter and resting images were completed.      Patient's IV site was injected by RN with 0.4mg's of Lexiscan (Regadenoson) over a 15 second period, followed by a 5-mL saline flush.  The Nuclear Tech injected the Myoview tracer through the IV site 20 seconds later.      Patient offered C/O: short of breath      Total dose of Lexiscan was 0.4mg's.   Lexiscan NDC# 6263-0591-64   Total dose of Aminophylline was 0 mg  Aminophylline NDC# 9671-2847-62  Total dose of Metoprolol was 0 mg  Metoprolol NDC#  [address]-10  Total dose of Labetalol was 0 mg   Labetalol NDC# 0686-2176-67    Dr. Arzate provided supervision of the tests performed today.

## 2017-11-02 ENCOUNTER — TELEPHONE (OUTPATIENT)
Dept: FAMILY MEDICINE | Facility: CLINIC | Age: 77
End: 2017-11-02

## 2017-11-02 NOTE — TELEPHONE ENCOUNTER
Please call patient to inform him his heart stress is normal.  He should thus gradually increase exercise.    Thanks    Dayday Alberts MD

## 2017-11-02 NOTE — TELEPHONE ENCOUNTER
Patient was seen for dyspnea on 9/7/17 and referred for stress test.    I called patient at [phone], he answered and I advised him of the stress test result and Dr. Alberts's note.   He apparently is not currently doing any exercise.     I advised that Dr. Alberts must think it would be a good idea to start doing some.   Patient is scheduled to see Dr. Alberts tomorrow anyhow and states he will ask more about this then.    Patient verbalized understanding of and agreement with plan.  Idania Ansari RN  [facility]

## 2017-11-08 DIAGNOSIS — I10 BENIGN ESSENTIAL HYPERTENSION: ICD-10-CM

## 2017-11-10 RX ORDER — AMLODIPINE BESYLATE 5 MG/1
TABLET ORAL
Qty: 90 TABLET | Refills: 1 | Status: SHIPPED | OUTPATIENT
Start: 2017-11-10 | End: 2018-02-13

## 2017-11-10 NOTE — TELEPHONE ENCOUNTER
Prescription approved per [facility] Refill Protocol.Leticia Villatoro, RN-BSN

## 2017-11-17 ENCOUNTER — OFFICE VISIT (OUTPATIENT)
Dept: FAMILY MEDICINE | Facility: CLINIC | Age: 77
End: 2017-11-17
Payer: COMMERCIAL

## 2017-11-17 VITALS
TEMPERATURE: 97.6 F | BODY MASS INDEX: 24.3 KG/M2 | WEIGHT: 135 LBS | HEART RATE: 68 BPM | SYSTOLIC BLOOD PRESSURE: 95 MMHG | DIASTOLIC BLOOD PRESSURE: 59 MMHG | OXYGEN SATURATION: 99 %

## 2017-11-17 DIAGNOSIS — G47.00 INSOMNIA, UNSPECIFIED TYPE: ICD-10-CM

## 2017-11-17 DIAGNOSIS — Z86.0100 HISTORY OF COLONIC POLYPS: ICD-10-CM

## 2017-11-17 DIAGNOSIS — R10.13 ABDOMINAL PAIN, EPIGASTRIC: Primary | ICD-10-CM

## 2017-11-17 DIAGNOSIS — T14.8XXA EXCORIATION: ICD-10-CM

## 2017-11-17 DIAGNOSIS — L81.0 POST-INFLAMMATORY HYPERPIGMENTATION: ICD-10-CM

## 2017-11-17 DIAGNOSIS — K21.9 GASTROESOPHAGEAL REFLUX DISEASE, ESOPHAGITIS PRESENCE NOT SPECIFIED: ICD-10-CM

## 2017-11-17 PROCEDURE — 99214 OFFICE O/P EST MOD 30 MIN: CPT | Performed by: FAMILY MEDICINE

## 2017-11-17 RX ORDER — GABAPENTIN 100 MG/1
CAPSULE ORAL
Qty: 60 CAPSULE | Refills: 0 | Status: SHIPPED | OUTPATIENT
Start: 2017-11-17 | End: 2017-12-30

## 2017-11-17 ASSESSMENT — PAIN SCALES - GENERAL: PAINLEVEL: NO PAIN (0)

## 2017-11-17 NOTE — PROGRESS NOTES
SUBJECTIVE:   Bhaskar Ott is a 76 year old male who presents to clinic today for the following health issues:       Check sores on stomach    none    Problem list and histories reviewed & adjusted, as indicated.  Additional history: as documented         Reviewed and updated as needed this visit by clinical staff       Reviewed and updated as needed this visit by Provider          stomach better    Feels like it is healing      Last colonoscopy 2013, had some adenomatous polyps    Lots of gas    Stool softeners helping    Bowels moving okay    Gabapentin- patient wants refill.  Takes 1 or 2 100 mg tabs at night if needed    Full physical not done     Mentation and affect are fine    No tremor of speech or extremity    On abd, pat has several areas of old excoriation.  Some surrounding postinflammatory hyperpigmentation    Taking brain pill that had been advertised on TV    137 systolic at pharmacy today    Breathing fine    abd soft     No back or costovertebral angle tenderness    ASSESSMENT / PLAN:  (R10.13) Abdominal pain, epigastric  (primary encounter diagnosis)  Comment: patient needs both upper and lower scope exams.  He will call to schedule  Plan: GASTROENTEROLOGY ADULT REF PROCEDURE ONLY             (Z86.010) History of colonic polyps  Comment: as above   Plan: GASTROENTEROLOGY ADULT REF PROCEDURE ONLY             (K21.9) Gastroesophageal reflux disease, esophagitis presence not specified  Comment: as above ; continue same acid reducer for now   Plan: GASTROENTEROLOGY ADULT REF PROCEDURE ONLY             (G47.00) Insomnia, unspecified type  Comment: patient using this mainly for sleep  Plan: gabapentin (NEURONTIN) 100 MG capsule        Refill     (T14.8XXA) Excoriation  Comment: discussed in detail.    Plan: advised no scratching at all     (L81.0) Post-inflammatory hyperpigmentation  Comment: not worrisome   Plan: discussed        I reviewed the patient's medications, allergies, medical history,  family history, and social history.    Dayday Alberts MD

## 2017-11-17 NOTE — MR AVS SNAPSHOT
After Visit Summary   11/17/2017    Bhaskar Ott    MRN: [id number]           Patient Information     Date Of Birth          1940        Visit Information        Provider Department      11/17/2017 3:20 PM Dayday Alberts MD [facility]        Today's Diagnoses     History of colonic polyps    -  1    Abdominal pain, epigastric        Gastroesophageal reflux disease, esophagitis presence not specified        Insomnia, unspecified type          Care Instructions    Call to schedule scope exams.  Clarify that we want both upper and lower scopes done.  Upper endoscopy and colonoscopy.    Continue other meds as is    We we sent in refill on gabapentin, just use as needed     Stay well hydrated    Make sure you don't pick at sores on abdomen              Follow-ups after your visit        Additional Services     GASTROENTEROLOGY ADULT REF PROCEDURE ONLY       Last Lab Result: Creatinine (mg/dL)       Date                     Value                 06/23/2017               1.14             ----------  Body mass index is 24.3 kg/(m^2).     Needed:  No  Language:  English    Patient will be contacted to schedule procedure.     Please be aware that coverage of these services is subject to the terms and limitations of your health insurance plan.  Call member services at your health plan with any benefit or coverage questions.  Any procedures must be performed at a [facility] facility OR coordinated by your clinic's referral office.    Please bring the following with you to your appointment:    (1) Any X-Rays, CTs or MRIs which have been performed.  Contact the facility where they were done to arrange for  prior to your scheduled appointment.    (2) List of current medications   (3) This referral request   (4) Any documents/labs given to you for this referral        PATIENT NEEDS BOTH UPPER AND LOWER ENDOSCOPIES DONE                  Your next 10 appointments  already scheduled     Nov 17, 2017  3:20 PM CST   SHORT with Dayday Alberts MD   [facility]    [address] [facility] [id number] [phone]            Apr 09, 2018 10:50 AM CDT   XR THORACIC/LUMBAR STANDING 2 VIEWS (SCOLI) with UCXR4   [facility] ([facility])    [address] [facility] [id number] [phone]           Please bring a list of your current medicines to your exam. (Include vitamins, minerals and over-thecounter medicines.) Leave your valuables at home.  Tell your doctor if there is a chance you may be pregnant.  You do not need to do anything special for this exam.            Apr 09, 2018 11:15 AM CDT   (Arrive by 11:00 AM)   Return Visit with Saul Bettencourt MD   [facility] ([facility])    [address] [id number] [phone]              Who to contact     If you have questions or need follow up information about today's clinic visit or your schedule please contact [facility] directly at [phone].  Normal or non-critical lab and imaging results will be communicated to you by MyChart, letter or phone within 4 business days after the clinic has received the results. If you do not hear from us within 7 days, please contact the clinic through Medic[facility] or phone. If you have a critical or abnormal lab result, we will notify you by phone as soon as possible.  Submit refill requests through B[employer] or call your pharmacy and they will forward the refill request to us. Please allow 3 business days for your refill to be completed.          Additional Information About Your Visit        Barcoding Information     Barcoding lets you send messages to your doctor, view your test results, renew your prescriptions, schedule appointments and more. To sign up, go  "to [email or web address] . Click on \"Log in\" on the left side of the screen, which will take you to the Welcome page. Then click on \"Sign up Now\" on the right side of the page.     You will be asked to enter the access code listed below, as well as some personal information. Please follow the directions to create your username and password.     Your access code is: [email or web address]  Expires: 2018  8:52 AM     Your access code will  in 90 days. If you need help or a new code, please call your [place] clinic or [phone].        Care EveryWhere ID     This is your Care EveryWhere ID. This could be used by other organizations to access your [facility] medical records  [id number]        Your Vitals Were     Pulse Temperature Pulse Oximetry BMI (Body Mass Index)          68 97.6  F (36.4  C) (Oral) 99% 24.3 kg/m2         Blood Pressure from Last 3 Encounters:   17 95/59   10/30/17 92/68   17 109/61    Weight from Last 3 Encounters:   17 135 lb (61.2 kg)   17 134 lb (60.8 kg)   17 132 lb 8 oz (60.1 kg)              We Performed the Following     GASTROENTEROLOGY ADULT REF PROCEDURE ONLY          Today's Medication Changes          These changes are accurate as of: 17  3:17 PM.  If you have any questions, ask your nurse or doctor.               Start taking these medicines.        Dose/Directions    gabapentin 100 MG capsule   Commonly known as:  NEURONTIN   Used for:  Insomnia, unspecified type   Started by:  Dayday Alberts MD        1-2 po at bedtime as needed for sleep   Quantity:  60 capsule   Refills:  0            Where to get your medicines      These medications were sent to [facility][id number] - [place], [place] -  [address], [place] [address]     Phone:  [phone]     gabapentin 100 MG capsule                Primary Care Provider Office Phone # Fax #    Dayday Alberts [phone] [address] " [facility] [place] [address]        Equal Access to Services     DAVID BENITEZ : Hadii nohelia devries patrick Sochapincito, waaxda luqadaha, qaybta kaalmada jason, alesia coronado. So [facility] [phone].    ATENCIÓN: Si habla español, tiene a bhatti disposición servicios gratuitos de asistencia lingüística. Carly al [phone].    We comply with applicable federal civil rights laws and [place] laws. We do not discriminate on the basis of race, color, national origin, age, disability, sex, sexual orientation, or gender identity.            Thank you!     Thank you for choosing [facility]  for your care. Our goal is always to provide you with excellent care. Hearing back from our patients is one way we can continue to improve our services. Please take a few minutes to complete the written survey that you may receive in the mail after your visit with us. Thank you!             Your Updated Medication List - Protect others around you: Learn how to safely use, store and throw away your medicines at www.disposemymeds.org.          This list is accurate as of: 11/17/17  3:17 PM.  Always use your most recent med list.                   Brand Name Dispense Instructions for use Diagnosis    albuterol 108 (90 BASE) MCG/ACT Inhaler    PROAIR HFA/PROVENTIL HFA/VENTOLIN HFA    1 Inhaler    Inhale 2 puffs into the lungs every 6 hours as needed for shortness of breath / dyspnea or wheezing    Dyspnea, unspecified type       amLODIPine 5 MG tablet    NORVASC    90 tablet    TAKE 1 TABLET (5 MG) BY MOUTH DAILY    Benign essential hypertension       B-12 1000 MCG Tbcr     100 tablet    Take 1,000 mcg by mouth daily    Cognitive impairment       BOOST BREEZE Liqd     90 each    Take 1 Can by mouth 3 times daily (with meals)    Cachexia (H)       Calcium carb-Vitamin D 500 mg [place]-200 units 500-200 MG-UNIT per tablet    OSCAL with D;Oyster Shell Calcium    90 tablet    Take 1 tablet by mouth daily     Chronic lower back pain       cholecalciferol 1000 UNIT tablet    vitamin D3    90 tablet    Take 1 tablet (1,000 Units) by mouth daily    Vitamin D deficiency       ferrous gluconate 325 (36 FE) MG Tabs     180 tablet    Take 1 tablet by mouth 2 times daily    Iron deficiency anemia, unspecified       finasteride 5 MG tablet    PROSCAR    90 tablet    Take 1 tablet (5 mg) by mouth daily    Benign non-nodular prostatic hyperplasia with lower urinary tract symptoms       gabapentin 100 MG capsule    NEURONTIN    60 capsule    1-2 po at bedtime as needed for sleep    Insomnia, unspecified type       * lidocaine 5 % Patch    LIDODERM    30 patch    Place 1 patch onto the skin every 12 hours as needed for moderate pain Note we will not do a prior authorization on this med. Patient insisted on getting prescription and he states he will deal with insurance company himself.    Chronic low back pain, unspecified back pain laterality, with sciatica presence unspecified       * lidocaine 5 % ointment    XYLOCAINE    142 g    One application 4 x daily to affected areas lower back and knees if necessary    Lesion of sciatic nerve, left       Menthol 10 % Aero     1 each    Externally apply 1 Dose topically 2 times daily as needed    Chronic low back pain, unspecified back pain laterality, with sciatica presence unspecified       MULTIvitamin  S Caps     90 capsule    Take 1 tablet by mouth daily    Iron deficiency anemia, unspecified       omeprazole 20 MG CR capsule    priLOSEC    180 capsule    TAKE 1 CAPSULE (20 MG) BY MOUTH 2 TIMES DAILY    Gastroesophageal reflux disease without esophagitis       oxybutynin 5 MG tablet    DITROPAN    90 tablet    Take 1 tablet (5 mg) by mouth 3 times daily    Overactive bladder       oxyCODONE IR 5 MG tablet    ROXICODONE    80 tablet    Take 1 tablet (5 mg) by mouth every 12 hours as needed for moderate to severe pain    Chronic pain syndrome, Spinal stenosis in cervical region        polyethylene glycol Packet    MIRALAX/GLYCOLAX    30 packet    Take 34 g by mouth daily    Thoracic spondylosis with myelopathy       pravastatin 40 MG tablet    PRAVACHOL    90 tablet    TAKE 1 TABLET (40 MG) BY MOUTH DAILY    Pure hypercholesterolemia       senna-docusate 8.6-50 MG per tablet    SENOKOT-S;PERICOLACE    60 tablet    Take 1 tablet by mouth 2 times daily    Chronic pain syndrome       tamsulosin 0.4 MG capsule    FLOMAX    180 capsule    TAKE 1 CAPSULE (0.4 MG) BY MOUTH TWO TIMES DAILY    Benign non-nodular prostatic hyperplasia with lower urinary tract symptoms       * Notice:  This list has 2 medication(s) that are the same as other medications prescribed for you. Read the directions carefully, and ask your doctor or other care provider to review them with you.

## 2017-11-17 NOTE — NURSING NOTE
"Chief Complaint   Patient presents with     Derm Problem       Initial BP 95/59 (BP Location: Right arm, Patient Position: Chair, Cuff Size: Adult Regular)  Pulse 68  Temp 97.6  F (36.4  C) (Oral)  Wt 135 lb (61.2 kg)  SpO2 99%  BMI 24.3 kg/m2 Estimated body mass index is 24.3 kg/(m^2) as calculated from the following:    Height as of 9/12/17: 5' 2.5\" (1.588 m).    Weight as of this encounter: 135 lb (61.2 kg).  Medication Reconciliation: complete   Mariann Heard CMA      "

## 2017-12-08 DIAGNOSIS — K21.9 GASTROESOPHAGEAL REFLUX DISEASE WITHOUT ESOPHAGITIS: ICD-10-CM

## 2017-12-11 NOTE — TELEPHONE ENCOUNTER
Requested Prescriptions   Pending Prescriptions Disp Refills     omeprazole (PRILOSEC) 20 MG CR capsule [Pharmacy Med Name: OMEPRAZOLE DR 20 MG CAPSULE] 180 capsule 1     Sig: TAKE 1 CAPSULE (20 MG) BY MOUTH 2 TIMES DAILY    PPI Protocol Passed    12/8/2017  7:06 PM       Passed - Not on Clopidogrel (unless Pantoprazole ordered)       Passed - No diagnosis of osteoporosis on record       Passed - Recent or future visit with authorizing provider's specialty    Patient had office visit in the last year or has a visit in the next 30 days with authorizing provider.  See chart review.              Passed - Patient is age 18 or older

## 2017-12-18 ENCOUNTER — CARE COORDINATION (OUTPATIENT)
Dept: GERIATRIC MEDICINE | Facility: CLINIC | Age: 77
End: 2017-12-18

## 2018-01-01 PROBLEM — Z76.89 HEALTH CARE HOME: Status: ACTIVE | Noted: 2017-12-12

## 2018-01-01 NOTE — PROGRESS NOTES
Client is new enrollee to [facility] effective 12/01/17 with [facility] [facility]. Client transferred from [facility].    No home visit required because this CM has received all required documentations from the previous CM.    Writer t/c to client, introduced self as client s new CM. Confirmed with client that the welcome letter with writer's name and contact information has been received.  Reviewed HRA/LTCC and POC with client. Client reports doing well with no significant change in health condition.  Writer reviewed the following with client:  ER visits: No  Hospitalizations: No  TCU stays: No  Significant health status changes: None  Falls/Injuries: No  ADL/IADL changes: No  Changes in services: No    Follow-Up Plan: Client informed of future contact, plan to f/u with client with a 6 month telephone assessment.  Contact information shared with member and family, encouraged client to call with any questions or concerns.  Charlee Petersen RN  [facility]   [phone]

## 2018-01-04 DIAGNOSIS — E78.00 PURE HYPERCHOLESTEROLEMIA: ICD-10-CM

## 2018-01-04 NOTE — TELEPHONE ENCOUNTER
equested Prescriptions   Pending Prescriptions Disp Refills     pravastatin (PRAVACHOL) 40 MG tablet [Pharmacy Med Name: PRAVASTATIN SODIUM 40 MG TAB] 90 tablet 0    Last Written Prescription Date:  10/31/17  Last Fill Quantity: 90,  # refills: 0   Last Office Visit with [facility], [facility] or [facility] prescribing provider:  11/17/17   Future Office Visit:      Sig: TAKE 1 TABLET (40 MG) BY MOUTH DAILY    Statins Protocol Passed    1/4/2018 11:17 AM       Passed - LDL on file in past 12 months    Recent Labs   Lab Test  06/23/17   1703   LDL  80            Passed - No abnormal creatine kinase in past 12 months    No lab results found.         Passed - Recent or future visit with authorizing provider    Patient had office visit in the last year or has a visit in the next 30 days with authorizing provider.  See chart review.              Passed - Patient is age 18 or older

## 2018-01-05 RX ORDER — PRAVASTATIN SODIUM 40 MG
TABLET ORAL
Qty: 90 TABLET | Refills: 0 | Status: SHIPPED | OUTPATIENT
Start: 2018-01-05 | End: 2018-04-14

## 2018-01-19 ENCOUNTER — TRANSFERRED RECORDS (OUTPATIENT)
Dept: HEALTH INFORMATION MANAGEMENT | Facility: CLINIC | Age: 78
End: 2018-01-19

## 2018-01-24 DIAGNOSIS — N40.1 BENIGN NON-NODULAR PROSTATIC HYPERPLASIA WITH LOWER URINARY TRACT SYMPTOMS: ICD-10-CM

## 2018-01-24 NOTE — TELEPHONE ENCOUNTER
"Requested Prescriptions   Pending Prescriptions Disp Refills     tamsulosin (FLOMAX) 0.4 MG capsule [Pharmacy Med Name: TAMSULOSIN HCL 0.4 MG CAPSULE] 180 capsule 0    Last Written Prescription Date:  10-17-17  Last Fill Quantity: 180,  # refills: 0   Last Office Visit with G, P or [facility] prescribing provider:  11-17-17   Future Office Visit:    Next 5 appointments (look out 90 days)     Jan 26, 2018  3:40 PM CST   [facility]    [address] [place] [id number] [phone]                  Sig: TAKE 1 CAPSULE (0.4 MG) BY MOUTH TWO TIMES DAILY    Alpha Blockers Passed    1/24/2018 10:07 AM       Passed - BP is less than 140/90    BP Readings from Last 3 Encounters:   11/17/17 95/59   10/30/17 92/68   09/12/17 109/61                Passed - Recent or future visit with authorizing provider's specialty    Patient had office visit in the last year or has a visit in the next 30 days with authorizing provider.  See \"Patient Info\" tab in inbasket, or \"Choose Columns\" in Meds & Orders section of the refill encounter.            Passed - Patient does not have Tadalafil, Vardenafil, or Sildenafil on their medication list       Passed - Patient is 18 years of age or older          "

## 2018-01-25 RX ORDER — TAMSULOSIN HYDROCHLORIDE 0.4 MG/1
CAPSULE ORAL
Qty: 180 CAPSULE | Refills: 1 | Status: SHIPPED | OUTPATIENT
Start: 2018-01-25 | End: 2018-07-28

## 2018-02-12 DIAGNOSIS — R06.00 DYSPNEA, UNSPECIFIED TYPE: ICD-10-CM

## 2018-02-12 NOTE — TELEPHONE ENCOUNTER
"Requested Prescriptions   Pending Prescriptions Disp Refills     PROAIR  (90 BASE) MCG/ACT inhaler [Pharmacy Med Name: PROAIR HFA 90 MCG INHALER] 8.5 Inhaler 3    Last Written Prescription Date:  1-30-18  Last Fill Quantity: 8.5,  # refills: 3   Last office visit: 11/17/2017 with prescribing provider:     Future Office Visit:   Next 5 appointments (look out 90 days)     Feb 13, 2018 10:40 AM CST   SHORT with Dayday Alberts MD   [facility]    [address] [place] [id number]   [phone]                  Sig: INHALE 2 PUFFS INTO THE LUNGS EVERY 6 HOURS AS NEEDED FOR SHORTNESS OF BREATH / DYSPNEA OR WHEEZING    Asthma Maintenance Inhalers - Anticholinergics Passed    2/12/2018  2:33 PM       Passed - Patient is age 12 years or older       Passed - Recent or future visit with authorizing provider's specialty    Patient had office visit in the last year or has a visit in the next 30 days with authorizing provider.  See \"Patient Info\" tab in inbasket, or \"Choose Columns\" in Meds & Orders section of the refill encounter.               "

## 2018-02-13 ENCOUNTER — OFFICE VISIT (OUTPATIENT)
Dept: FAMILY MEDICINE | Facility: CLINIC | Age: 78
End: 2018-02-13
Payer: COMMERCIAL

## 2018-02-13 VITALS
HEART RATE: 71 BPM | BODY MASS INDEX: 24.11 KG/M2 | OXYGEN SATURATION: 98 % | DIASTOLIC BLOOD PRESSURE: 53 MMHG | TEMPERATURE: 98.4 F | SYSTOLIC BLOOD PRESSURE: 103 MMHG | WEIGHT: 131 LBS | HEIGHT: 62 IN

## 2018-02-13 DIAGNOSIS — S50.02XA CONTUSION OF LEFT ELBOW, INITIAL ENCOUNTER: ICD-10-CM

## 2018-02-13 DIAGNOSIS — S00.03XA CONTUSION OF SCALP, INITIAL ENCOUNTER: ICD-10-CM

## 2018-02-13 DIAGNOSIS — Z12.11 SCREEN FOR COLON CANCER: ICD-10-CM

## 2018-02-13 DIAGNOSIS — I10 HYPERTENSION GOAL BP (BLOOD PRESSURE) < 140/90: Primary | ICD-10-CM

## 2018-02-13 DIAGNOSIS — M48.02 SPINAL STENOSIS IN CERVICAL REGION: ICD-10-CM

## 2018-02-13 PROCEDURE — 99214 OFFICE O/P EST MOD 30 MIN: CPT | Performed by: FAMILY MEDICINE

## 2018-02-13 RX ORDER — AMLODIPINE BESYLATE 2.5 MG/1
2.5 TABLET ORAL DAILY
Qty: 90 TABLET | Refills: 3 | Status: SHIPPED | OUTPATIENT
Start: 2018-02-13 | End: 2018-08-24
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
## 2018-02-13 NOTE — PATIENT INSTRUCTIONS
Stop the 5 mg amlodipine pill    Start 2.5 mg amlodipine pill daily    Do the stool test ( FIT test )    Stay active physically

## 2018-02-13 NOTE — NURSING NOTE
"Chief Complaint   Patient presents with     Patient Request     Discuss test that are needed, missed his appointments     Health Maintenance       Initial /53 (BP Location: Right arm, Patient Position: Chair, Cuff Size: Adult Regular)  Pulse 71  Temp 98.4  F (36.9  C) (Oral)  Ht 5' 2.25\" (1.581 m)  Wt 131 lb (59.4 kg)  SpO2 98%  BMI 23.77 kg/m2 Estimated body mass index is 23.77 kg/(m^2) as calculated from the following:    Height as of this encounter: 5' 2.25\" (1.581 m).    Weight as of this encounter: 131 lb (59.4 kg).  Medication Reconciliation: complete   Carrol Barbosa CMA       "

## 2018-02-13 NOTE — MR AVS SNAPSHOT
After Visit Summary   2/13/2018    Bhaskar Ott    MRN: [id number]           Patient Information     Date Of Birth          1940        Visit Information        Provider Department      2/13/2018 10:40 AM Dayday Albrets MD [facility]        Today's Diagnoses     Hypertension goal BP (blood pressure) < 140/90    -  1    Screen for colon cancer          Care Instructions    Stop the 5 mg amlodipine pill    Start 2.5 mg amlodipine pill daily    Do the stool test ( FIT test )    Stay active physically             Follow-ups after your visit        Your next 10 appointments already scheduled     Apr 09, 2018 10:50 AM CDT   XR THORACIC/LUMBAR STANDING 2 VIEWS (SCOLI) with UCXR4   [facility] Xray ([facility])    [address]  1st Floor  [facility] [address] [phone]           Please bring a list of your current medicines to your exam. (Include vitamins, minerals and over-thecounter medicines.) Leave your valuables at home.  Tell your doctor if there is a chance you may be pregnant.  You do not need to do anything special for this exam.            Apr 09, 2018 11:15 AM CDT   (Arrive by 11:00 AM)   Return Visit with Saul Bettencourt MD   [facility] Neurosurgery ([facility])    [address]  3rd Floor  [facility] [place] [address] [phone]              Future tests that were ordered for you today     Open Future Orders        Priority Expected Expires Ordered    Fecal colorectal cancer screen (FIT) Routine 3/6/2018 5/8/2018 2/13/2018            Who to contact     If you have questions or need follow up information about today's clinic visit or your schedule please contact [facility] directly at [phone].  Normal or non-critical lab and imaging results will be communicated to you by MyChart, letter or phone within 4 business days after the clinic has received  "the results. If you do not hear from us within 7 days, please contact the clinic through [employer] or phone. If you have a critical or abnormal lab result, we will notify you by phone as soon as possible.  Submit refill requests through [employer] or call your pharmacy and they will forward the refill request to us. Please allow 3 business days for your refill to be completed.          Additional Information About Your Visit        [employer] Information     [employer] lets you send messages to your doctor, view your test results, renew your prescriptions, schedule appointments and more. To sign up, go to [email or web address] . Click on \"Log in\" on the left side of the screen, which will take you to the Welcome page. Then click on \"Sign up Now\" on the right side of the page.     You will be asked to enter the access code listed below, as well as some personal information. Please follow the directions to create your username and password.     Your access code is: [email or web address]  Expires: 2018 10:59 AM     Your access code will  in 90 days. If you need help or a new code, please call your [facility] clinic or [phone].        Care EveryWhere ID     This is your Care EveryWhere ID. This could be used by other organizations to access your [facility] medical records  [id number]        Your Vitals Were     Pulse Temperature Height Pulse Oximetry BMI (Body Mass Index)       71 98.4  F (36.9  C) (Oral) 5' 2.25\" (1.581 m) 98% 23.77 kg/m2        Blood Pressure from Last 3 Encounters:   18 103/53   17 95/59   10/30/17 92/68    Weight from Last 3 Encounters:   18 131 lb (59.4 kg)   17 135 lb (61.2 kg)   17 134 lb (60.8 kg)                 Today's Medication Changes          These changes are accurate as of 18 10:59 AM.  If you have any questions, ask your nurse or doctor.               These medicines have changed or have updated prescriptions.        Dose/Directions    amLODIPine 2.5 MG " tablet   Commonly known as:  NORVASC   This may have changed:  See the new instructions.   Used for:  Hypertension goal BP (blood pressure) < 140/90   Changed by:  Dayday Alberts MD        Dose:  2.5 mg   Take 1 tablet (2.5 mg) by mouth daily   Quantity:  90 tablet   Refills:  3            Where to get your medicines      These medications were sent to [facility]/pharmacy #[id number] - [place], [place] - [address], [place] [address]     Phone:  [phone]     amLODIPine 2.5 MG tablet                Primary Care Provider Office Phone # Fax #    Dayday Alberts [phone]       [address] [address]        Equal Access to Services     GRISELDA [facility] Hospital[facility]ILIANA : Hadii nohelia devries hadasho Soomaali, waaxda luqadaha, qaybta kaalmada adeegyada, alesia pollockin zoe frye . So [facility] [phone].    ATENCIÓN: Si habla español, tiene a bhatti disposición servicios gratuitos de asistencia lingüística. Llame al [phone].    We comply with applicable federal civil rights laws and [place] laws. We do not discriminate on the basis of race, color, national origin, age, disability, sex, sexual orientation, or gender identity.            Thank you!     Thank you for choosing [facility]  for your care. Our goal is always to provide you with excellent care. Hearing back from our patients is one way we can continue to improve our services. Please take a few minutes to complete the written survey that you may receive in the mail after your visit with us. Thank you!             Your Updated Medication List - Protect others around you: Learn how to safely use, store and throw away your medicines at www.disposemymeds.org.          This list is accurate as of 2/13/18 10:59 AM.  Always use your most recent med list.                   Brand Name Dispense Instructions for use Diagnosis    amLODIPine 2.5 MG tablet    NORVASC    90 tablet    Take 1 tablet  (2.5 mg) by mouth daily    Hypertension goal BP (blood pressure) < 140/90       B-12 1000 MCG Tbcr     100 tablet    Take 1,000 mcg by mouth daily    Cognitive impairment       BOOST BREEZE Liqd     90 each    Take 1 Can by mouth 3 times daily (with meals)    Cachexia (H)       Calcium carb-Vitamin D 500 mg Ewiiaapaayp-200 units 500-200 MG-UNIT per tablet    OSCAL with D;Oyster Shell Calcium    90 tablet    Take 1 tablet by mouth daily    Chronic lower back pain       cholecalciferol 1000 UNIT tablet    vitamin D3    90 tablet    Take 1 tablet (1,000 Units) by mouth daily    Vitamin D deficiency       ferrous gluconate 325 (36 FE) MG Tabs     180 tablet    Take 1 tablet by mouth 2 times daily    Iron deficiency anemia, unspecified       finasteride 5 MG tablet    PROSCAR    90 tablet    Take 1 tablet (5 mg) by mouth daily    Benign non-nodular prostatic hyperplasia with lower urinary tract symptoms       gabapentin 100 MG capsule    NEURONTIN    60 capsule    1-2 BY MOUTH AT BEDTIME AS NEEDED FOR SLEEP    Insomnia, unspecified type       lidocaine 5 % ointment    XYLOCAINE    142 g    One application 4 x daily to affected areas lower back and knees if necessary    Lesion of sciatic nerve, left       Menthol 10 % Aero     1 each    Externally apply 1 Dose topically 2 times daily as needed    Chronic low back pain, unspecified back pain laterality, with sciatica presence unspecified       MULTIvitamin  S Caps     90 capsule    Take 1 tablet by mouth daily    Iron deficiency anemia, unspecified       omeprazole 20 MG CR capsule    priLOSEC    180 capsule    TAKE 1 CAPSULE (20 MG) BY MOUTH 2 TIMES DAILY    Gastroesophageal reflux disease without esophagitis       oxybutynin 5 MG tablet    DITROPAN    90 tablet    Take 1 tablet (5 mg) by mouth 3 times daily    Overactive bladder       oxyCODONE IR 5 MG tablet    ROXICODONE    80 tablet    Take 1 tablet (5 mg) by mouth every 12 hours as needed for moderate to severe pain     Chronic pain syndrome, Spinal stenosis in cervical region       polyethylene glycol Packet    MIRALAX/GLYCOLAX    30 packet    Take 34 g by mouth daily    Thoracic spondylosis with myelopathy       pravastatin 40 MG tablet    PRAVACHOL    90 tablet    TAKE 1 TABLET (40 MG) BY MOUTH DAILY    Pure hypercholesterolemia       PROAIR  (90 BASE) MCG/ACT Inhaler   Generic drug:  albuterol     8.5 Inhaler    INHALE 2 PUFFS INTO THE LUNGS EVERY 6 HOURS AS NEEDED FOR SHORTNESS OF BREATH / DYSPNEA OR WHEEZING    Dyspnea, unspecified type       senna-docusate 8.6-50 MG per tablet    SENOKOT-S;PERICOLACE    60 tablet    Take 1 tablet by mouth 2 times daily    Chronic pain syndrome       tamsulosin 0.4 MG capsule    FLOMAX    180 capsule    TAKE 1 CAPSULE (0.4 MG) BY MOUTH TWO TIMES DAILY    Benign non-nodular prostatic hyperplasia with lower urinary tract symptoms

## 2018-02-14 PROCEDURE — 82274 ASSAY TEST FOR BLOOD FECAL: CPT | Performed by: FAMILY MEDICINE

## 2018-02-14 RX ORDER — ALBUTEROL SULFATE 90 UG/1
AEROSOL, METERED RESPIRATORY (INHALATION)
Qty: 8.5 INHALER | Refills: 3 | OUTPATIENT
Start: 2018-02-14

## 2018-02-14 NOTE — TELEPHONE ENCOUNTER
"Spoke to pharmacy, patient still has refills available, may disregard request.  Pharmacy will contact patient when refill available for .  \"Refusal\" routed back to pharmacy with note.    Idania Ansari RN  [facility]    "

## 2018-02-14 NOTE — TELEPHONE ENCOUNTER
Patient was seen by provider yesterday. Provider sent in a prescription 1/30/18. There should still be refills left. Will call and confirm with pharmacy when they open.    Amparo Craig RN  [facility]

## 2018-02-15 DIAGNOSIS — Z12.11 SCREEN FOR COLON CANCER: ICD-10-CM

## 2018-02-15 LAB — HEMOCCULT STL QL IA: NEGATIVE

## 2018-02-15 NOTE — LETTER
[facility]  [address] [place]  [address]  [phone]        February 15, 2018    Bhaskar Ott  [address]    [facility] [address]        Dear Bhaskar,    Stool test is normal.  No blood detected.     Results for orders placed or performed in visit on 02/15/18   Fecal colorectal cancer screen (FIT)   Result Value Ref Range    Occult Blood Scn FIT Negative NEG^Negative       If you have any questions please call the clinic at [phone].    Sincerely,    Dayday Alberts MD  [facility]

## 2018-02-22 ENCOUNTER — TELEPHONE (OUTPATIENT)
Dept: FAMILY MEDICINE | Facility: CLINIC | Age: 78
End: 2018-02-22

## 2018-02-22 NOTE — TELEPHONE ENCOUNTER
Only lab test recently was FIT test.     Please inform patient that this was normal.  No blood detected.    Dayday Alberts MD

## 2018-02-22 NOTE — TELEPHONE ENCOUNTER
Reason for Call:  Request for results:    Name of test or procedure: labs    Date of test of procedure: 2/15/18    Location of the test or procedure: [facility] to leave the result message on voice mail or with a family member? YES    Phone number Patient can be reached at:  Home number on file [phone] (home)        Call taken on 2/22/2018 at 1:13 PM by Lexy Chow

## 2018-02-22 NOTE — TELEPHONE ENCOUNTER
Attempt # 1  Called patient at home number.  Was call answered? Yes, relayed below results - patient requesting results be mailed also.     Gretchen Marroquin RN  [facility]

## 2018-03-15 DIAGNOSIS — G47.00 INSOMNIA, UNSPECIFIED TYPE: ICD-10-CM

## 2018-03-15 RX ORDER — GABAPENTIN 100 MG/1
CAPSULE ORAL
Qty: 60 CAPSULE | Refills: 1 | Status: SHIPPED | OUTPATIENT
Start: 2018-03-15 | End: 2018-04-12

## 2018-03-15 NOTE — TELEPHONE ENCOUNTER
Requested Prescriptions   Pending Prescriptions Disp Refills     gabapentin (NEURONTIN) 100 MG capsule 60 capsule 0    There is no refill protocol information for this order         Last Written Prescription Date:  2-12-18  Last Fill Quantity: 60,   # refills: 0  Last Office Visit: 2-13-18  Future Office visit:       Routing refill request to provider for review/approval because:  Drug not on the [facility] or [facility] refill protocol or controlled substance

## 2018-03-16 ENCOUNTER — TRANSFERRED RECORDS (OUTPATIENT)
Dept: HEALTH INFORMATION MANAGEMENT | Facility: CLINIC | Age: 78
End: 2018-03-16

## 2018-03-18 DIAGNOSIS — K21.9 GASTROESOPHAGEAL REFLUX DISEASE WITHOUT ESOPHAGITIS: ICD-10-CM

## 2018-03-19 NOTE — TELEPHONE ENCOUNTER
Prescription approved per [facility] Refill Protocol.  Lilibeth Botello, RN [facility] Triage.

## 2018-03-19 NOTE — TELEPHONE ENCOUNTER
"Requested Prescriptions   Pending Prescriptions Disp Refills     omeprazole (PRILOSEC) 20 MG CR capsule [Pharmacy Med Name: OMEPRAZOLE DR 20 MG CAPSULE] 180 capsule 0    Last Written Prescription Date:  9/6/17  Last Fill Quantity: 180,  # refills: 1   Last office visit: 2/13/2018 with prescribing provider:     Future Office Visit:     Sig: TAKE 1 CAPSULE (20 MG) BY MOUTH 2 TIMES DAILY    PPI Protocol Passed    3/18/2018 11:23 AM       Passed - Not on Clopidogrel (unless Pantoprazole ordered)       Passed - No diagnosis of osteoporosis on record       Passed - Recent (12 mo) or future (30 days) visit within the authorizing provider's specialty    Patient had office visit in the last 12 months or has a visit in the next 30 days with authorizing provider or within the authorizing provider's specialty.  See \"Patient Info\" tab in inbasket, or \"Choose Columns\" in Meds & Orders section of the refill encounter.           Passed - Patient is age 18 or older          "

## 2018-04-09 ENCOUNTER — RADIANT APPOINTMENT (OUTPATIENT)
Dept: GENERAL RADIOLOGY | Facility: CLINIC | Age: 78
End: 2018-04-09
Attending: NEUROLOGICAL SURGERY
Payer: COMMERCIAL

## 2018-04-09 ENCOUNTER — OFFICE VISIT (OUTPATIENT)
Dept: NEUROSURGERY | Facility: CLINIC | Age: 78
End: 2018-04-09

## 2018-04-09 VITALS — HEART RATE: 79 BPM | DIASTOLIC BLOOD PRESSURE: 80 MMHG | SYSTOLIC BLOOD PRESSURE: 132 MMHG | HEIGHT: 62 IN

## 2018-04-09 DIAGNOSIS — Z98.1 S/P SPINAL FUSION: ICD-10-CM

## 2018-04-09 DIAGNOSIS — M54.5 CHRONIC LOW BACK PAIN, UNSPECIFIED BACK PAIN LATERALITY, WITH SCIATICA PRESENCE UNSPECIFIED: Primary | ICD-10-CM

## 2018-04-09 DIAGNOSIS — Z98.1 S/P LAMINECTOMY WITH SPINAL FUSION: ICD-10-CM

## 2018-04-09 DIAGNOSIS — G89.29 CHRONIC LOW BACK PAIN, UNSPECIFIED BACK PAIN LATERALITY, WITH SCIATICA PRESENCE UNSPECIFIED: Primary | ICD-10-CM

## 2018-04-09 ASSESSMENT — PAIN SCALES - GENERAL: PAINLEVEL: EXTREME PAIN (8)

## 2018-04-09 NOTE — LETTER
"4/9/2018       RE: Bhaskar Ott  [address]    [facility] [address]     Dear Colleague,    Thank you for referring your patient, Bhaskar Ott, to the [facility] NEUROSURGERY at [facility]. Please see a copy of my visit note below.    It was a pleasure to see Bhaskar Ott today in Neurosurgery Clinic. He is a 77 year old male who underwent:     August 27, 2015    PREOPERATIVE DIAGNOSIS: Flat back syndrome, thoracic myelopathy.   POSTOPERATIVE DIAGNOSIS: Flat back syndrome, thoracic myelopathy.      PROCEDURES PERFORMED:   1. Removal of posterior instrumentation, T7 through L2 and removal of instrumentation from L5 through S1.   2. Replacement of pedicle screws at T7, T8, T11, L1, L2 and L5 bilaterally.   3. Stealth O-arm guided placement of bilateral pedicle screws at T3, T4, T5, T6, L4, L5, ilium (S1AI trajectory)  4. L3 pedicle subtraction osteotomy   5. T6 Chevron (Smith/Edwards) osteotomy.  6. Posterior arthrodesis from T3 through T7 with local autograft.  7. Placement of Nixon-Scot tongs.   8. Intraoperative neural monitoring.      STAFF SURGEON: Saul Bettencourt MD      COSURGEON: Ayo Mcdaniels MD      ASSISTANTS:   1. Rosales Hill MD   2. Pamela Billingsley MD     He continues to have pain in the LLE and neck and left shoulder. His symptoms overall sound quite stable.            Vitals:    04/09/18 1113   BP: 132/80   BP Location: Left arm   Patient Position: Chair   Cuff Size: Adult Regular   Pulse: 79   Height: 1.581 m (5' 2.25\")     There is no height or weight on file to calculate BMI.  Extreme Pain (8)    Stable incision.  Strength 5.5 BLE in HF.      Imaging: Standing scoliosis films show stable alignment of the spine. The imaging was shown to the patient and reviewed in clinic.     Assessment: S/P T3-Pelvis fusion, stable.    Plan: At this point I do not think he has surgical problems. I recommend that he talk to his pain provider " about potential spinal cord stimulation for his LLE pain.     Again, thank you for allowing me to participate in the care of your patient.      Sincerely,    Saul Bettencourt MD

## 2018-04-09 NOTE — MR AVS SNAPSHOT
"              After Visit Summary   2018    Bhaskar Ott    MRN: [id number]           Patient Information     Date Of Birth          1940        Visit Information        Provider Department      2018 11:15 AM Saul Bettencourt MD [facility] Neurosurgery        Today's Diagnoses     Chronic low back pain, unspecified back pain laterality, with sciatica presence unspecified    -  1       Follow-ups after your visit        Who to contact     Please call your clinic at [phone] to:    Ask questions about your health    Make or cancel appointments    Discuss your medicines    Learn about your test results    Speak to your doctor            Additional Information About Your Visit        MyChart Information     [employer] is an electronic gateway that provides easy, online access to your medical records. With [employer], you can request a clinic appointment, read your test results, renew a prescription or communicate with your care team.     To sign up for [employer] visit the website at [email or web address]   You will be asked to enter the access code listed below, as well as some personal information. Please follow the directions to create your username and password.     Your access code is: [email or web address]  Expires: 2018 11:59 AM     Your access code will  in 90 days. If you need help or a new code, please contact your [facility] or call [phone] for assistance.        Care EveryWhere ID     This is your Care EveryWhere ID. This could be used by other organizations to access your [place] medical records  [id number]        Your Vitals Were     Pulse Height                79 1.581 m (5' 2.25\")           Blood Pressure from Last 3 Encounters:   04/10/18 126/78   18 132/80   18 103/53    Weight from Last 3 Encounters:   04/10/18 59.4 kg (131 lb)   18 59.4 kg (131 lb)   17 61.2 kg (135 lb)              Today, you had the following     " No orders found for display       Primary Care Provider Office Phone # Fax #    Dayday Alberts [phone]       [address] [facility] [address]        Equal Access to Services     DAVID BENITEZ : Marcos nohelia devries john paulo Niyah, waannamariada luqadaha, qaybta kaalmada jason, alesia baker laMargapelon coronado. So [facility] [phone].    ATENCIÓN: Si habla español, tiene a bhatti disposición servicios gratuitos de asistencia lingüística. Llame al [phone].    We comply with applicable federal civil rights laws and [place] laws. We do not discriminate on the basis of race, color, national origin, age, disability, sex, sexual orientation, or gender identity.            Thank you!     Thank you for choosing [facility]  for your care. Our goal is always to provide you with excellent care. Hearing back from our patients is one way we can continue to improve our services. Please take a few minutes to complete the written survey that you may receive in the mail after your visit with us. Thank you!             Your Updated Medication List - Protect others around you: Learn how to safely use, store and throw away your medicines at www.disposemymeds.org.          This list is accurate as of 4/9/18 11:59 PM.  Always use your most recent med list.                   Brand Name Dispense Instructions for use Diagnosis    amLODIPine 2.5 MG tablet    NORVASC    90 tablet    Take 1 tablet (2.5 mg) by mouth daily    Hypertension goal BP (blood pressure) < 140/90       B-12 1000 MCG Tbcr     100 tablet    Take 1,000 mcg by mouth daily    Cognitive impairment       BOOST BREEZE Liqd     90 each    Take 1 Can by mouth 3 times daily (with meals)    Cachexia (H)       Calcium carb-Vitamin D 500 mg [place]-200 units 500-200 MG-UNIT per tablet    OSCAL with D;Oyster Shell Calcium    90 tablet    Take 1 tablet by mouth daily    Chronic lower back pain       cholecalciferol 1000 UNIT tablet    vitamin  D3    90 tablet    Take 1 tablet (1,000 Units) by mouth daily    Vitamin D deficiency       ferrous gluconate 325 (36 Fe) MG Tabs     180 tablet    Take 1 tablet by mouth 2 times daily    Iron deficiency anemia, unspecified       finasteride 5 MG tablet    PROSCAR    90 tablet    Take 1 tablet (5 mg) by mouth daily    Benign non-nodular prostatic hyperplasia with lower urinary tract symptoms       lidocaine 5 % ointment    XYLOCAINE    142 g    One application 4 x daily to affected areas lower back and knees if necessary    Lesion of sciatic nerve, left       Menthol 10 % Aero     1 each    Externally apply 1 Dose topically 2 times daily as needed    Chronic low back pain, unspecified back pain laterality, with sciatica presence unspecified       MULTIvitamin  S Caps     90 capsule    Take 1 tablet by mouth daily    Iron deficiency anemia, unspecified       omeprazole 20 MG CR capsule    priLOSEC    180 capsule    TAKE 1 CAPSULE (20 MG) BY MOUTH 2 TIMES DAILY    Gastroesophageal reflux disease without esophagitis       oxybutynin 5 MG tablet    DITROPAN    90 tablet    Take 1 tablet (5 mg) by mouth 3 times daily    Overactive bladder       oxyCODONE IR 5 MG tablet    ROXICODONE    80 tablet    Take 1 tablet (5 mg) by mouth every 12 hours as needed for moderate to severe pain    Chronic pain syndrome, Spinal stenosis in cervical region       polyethylene glycol Packet    MIRALAX/GLYCOLAX    30 packet    Take 34 g by mouth daily    Thoracic spondylosis with myelopathy       pravastatin 40 MG tablet    PRAVACHOL    90 tablet    TAKE 1 TABLET (40 MG) BY MOUTH DAILY    Pure hypercholesterolemia       PROAIR  (90 Base) MCG/ACT Inhaler   Generic drug:  albuterol     8.5 Inhaler    INHALE 2 PUFFS INTO THE LUNGS EVERY 6 HOURS AS NEEDED FOR SHORTNESS OF BREATH / DYSPNEA OR WHEEZING    Dyspnea, unspecified type       senna-docusate 8.6-50 MG per tablet    SENOKOT-S;PERICOLACE    60 tablet    Take 1 tablet by mouth 2  times daily    Chronic pain syndrome       tamsulosin 0.4 MG capsule    FLOMAX    180 capsule    TAKE 1 CAPSULE (0.4 MG) BY MOUTH TWO TIMES DAILY    Benign non-nodular prostatic hyperplasia with lower urinary tract symptoms

## 2018-04-09 NOTE — PROGRESS NOTES
"It was a pleasure to see Bhaskar Ott today in Neurosurgery Clinic. He is a 77 year old male who underwent:     August 27, 2015    PREOPERATIVE DIAGNOSIS: Flat back syndrome, thoracic myelopathy.   POSTOPERATIVE DIAGNOSIS: Flat back syndrome, thoracic myelopathy.      PROCEDURES PERFORMED:   1. Removal of posterior instrumentation, T7 through L2 and removal of instrumentation from L5 through S1.   2. Replacement of pedicle screws at T7, T8, T11, L1, L2 and L5 bilaterally.   3. Stealth O-arm guided placement of bilateral pedicle screws at T3, T4, T5, T6, L4, L5, ilium (S1AI trajectory)  4. L3 pedicle subtraction osteotomy   5. T6 Chevron (Smith/Edwards) osteotomy.  6. Posterior arthrodesis from T3 through T7 with local autograft.  7. Placement of Nixon-Scot tongs.   8. Intraoperative neural monitoring.      STAFF SURGEON: Saul Bettencourt MD      COSURGEON: Ayo Mcdaniels MD      ASSISTANTS:   1. Rosales Hill MD   2. Pamela Billingsley MD     He continues to have pain in the LLE and neck and left shoulder. His symptoms overall sound quite stable.            Vitals:    04/09/18 1113   BP: 132/80   BP Location: Left arm   Patient Position: Chair   Cuff Size: Adult Regular   Pulse: 79   Height: 1.581 m (5' 2.25\")     There is no height or weight on file to calculate BMI.  Extreme Pain (8)    Stable incision.  Strength 5.5 BLE in HF.      Imaging: Standing scoliosis films show stable alignment of the spine. The imaging was shown to the patient and reviewed in clinic.     Assessment: S/P T3-Pelvis fusion, stable.    Plan: At this point I do not think he has surgical problems. I recommend that he talk to his pain provider about potential spinal cord stimulation for his LLE pain.   "

## 2018-04-10 ENCOUNTER — OFFICE VISIT (OUTPATIENT)
Dept: FAMILY MEDICINE | Facility: CLINIC | Age: 78
End: 2018-04-10
Payer: COMMERCIAL

## 2018-04-10 VITALS
SYSTOLIC BLOOD PRESSURE: 126 MMHG | TEMPERATURE: 97.6 F | OXYGEN SATURATION: 98 % | WEIGHT: 131 LBS | DIASTOLIC BLOOD PRESSURE: 78 MMHG | BODY MASS INDEX: 23.77 KG/M2 | HEART RATE: 76 BPM

## 2018-04-10 DIAGNOSIS — R53.1 WEAKNESS: ICD-10-CM

## 2018-04-10 DIAGNOSIS — I10 HYPERTENSION GOAL BP (BLOOD PRESSURE) < 140/90: ICD-10-CM

## 2018-04-10 DIAGNOSIS — M25.511 RIGHT SHOULDER PAIN, UNSPECIFIED CHRONICITY: Primary | ICD-10-CM

## 2018-04-10 DIAGNOSIS — R21 RASH: ICD-10-CM

## 2018-04-10 DIAGNOSIS — M54.9 BACK PAIN, UNSPECIFIED BACK LOCATION, UNSPECIFIED BACK PAIN LATERALITY, UNSPECIFIED CHRONICITY: ICD-10-CM

## 2018-04-10 DIAGNOSIS — M54.2 NECK PAIN: ICD-10-CM

## 2018-04-10 PROCEDURE — 99214 OFFICE O/P EST MOD 30 MIN: CPT | Performed by: FAMILY MEDICINE

## 2018-04-10 ASSESSMENT — PAIN SCALES - GENERAL: PAINLEVEL: NO PAIN (0)
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
## 2018-04-10 NOTE — MR AVS SNAPSHOT
After Visit Summary   4/10/2018    Bhaskar Ott    MRN: [id number]           Patient Information     Date Of Birth          1940        Visit Information        Provider Department      4/10/2018 11:20 AM Dayday Alberts MD [facility]'s Diagnoses     Right shoulder pain, unspecified chronicity    -  1    Neck pain        Back pain, unspecified back location, unspecified back pain laterality, unspecified chronicity        Rash          Care Instructions    Advise active stretching and strengthening program    Physical therapy, call for appointment at a location near your home    Continue same meds for now    Use hydrocortisone cream 2x daily over the counter to affected area on abdomen, and moisturizing lotion    If rash area not resolving, see dermatology                   Follow-ups after your visit        Additional Services     DERMATOLOGY REFERRAL       Your provider has referred you to: [facility]: Dermatology Clinic [facility] [phone]   [email or web address]  [facility]: [facility] [phone]   [email or web address]  N: [facility] [phone]  [email or web address]  N: [facility] [phone]   [email or web address]    Please be aware that coverage of these services is subject to the terms and limitations of your health insurance plan.  Call member services at your health plan with any benefit or coverage questions.      Please bring the following with you to your appointment:    (1) Any X-Rays, CTs or MRIs which have been performed.  Contact the facility where they were done to arrange for  prior to your scheduled appointment.    (2) List of current medications  (3) This referral request   (4) Any documents/labs given to you for this referral             [facility] PT, HAND, AND CHIROPRACTIC REFERRAL       **This order will print in the [facility] Scheduling Office**    Physical Therapy, Hand Therapy and Chiropractic Care are available through:    *[facility]  *[facility]  *[facility]    Call one number to schedule at any of the above locations: [phone].    Your provider has referred you to: Physical Therapy at [facility] or [facility]    Indication/Reason for Referral: right shoulder pain, neck pain, back pain, generalized weakness  Onset of Illness: chronic, but worse in last few months  Therapy Orders: Evaluate and Treat  Special Programs: None  Special Request: None    Grecia Paige      Additional Comments for the Therapist or Chiropractor: patient needs good home exercise program.  History of back surgery.  Uses walker.      Please be aware that coverage of these services is subject to the terms and limitations of your health insurance plan.  Call member services at your health plan with any benefit or coverage questions.      Please bring the following to your appointment:    *Your personal calendar for scheduling future appointments  *Comfortable clothing                  Your next 10 appointments already scheduled     Apr 10, 2018 11:20 AM CDT   Office Visit with Dayday Alberts MD   [facility] ([facility])    [address] [place] [address] [phone]           Bring a current list of meds and any records pertaining to this visit. For Physicals, please bring immunization records and any forms needing to be filled out. Please arrive 10 minutes early to complete paperwork.              Who to contact     If you have questions or need follow up information about today's clinic visit or your schedule please contact [facility] directly at [phone].  Normal or non-critical lab and imaging results will be communicated to  "you by MyChart, letter or phone within 4 business days after the clinic has received the results. If you do not hear from us within 7 days, please contact the clinic through [employer] or phone. If you have a critical or abnormal lab result, we will notify you by phone as soon as possible.  Submit refill requests through [employer] or call your pharmacy and they will forward the refill request to us. Please allow 3 business days for your refill to be completed.          Additional Information About Your Visit        Per [employer] Information     [employer] lets you send messages to your doctor, view your test results, renew your prescriptions, schedule appointments and more. To sign up, go to [email or web address] . Click on \"Log in\" on the left side of the screen, which will take you to the Welcome page. Then click on \"Sign up Now\" on the right side of the page.     You will be asked to enter the access code listed below, as well as some personal information. Please follow the directions to create your username and password.     Your access code is: [email or web address]  Expires: 2018 11:59 AM     Your access code will  in 90 days. If you need help or a new code, please call your [place] clinic or [phone].        Care EveryWhere ID     This is your Care EveryWhere ID. This could be used by other organizations to access your [place] medical records  [id number]        Your Vitals Were     Pulse Temperature Pulse Oximetry BMI (Body Mass Index)          76 97.6  F (36.4  C) (Oral) 98% 23.77 kg/m2         Blood Pressure from Last 3 Encounters:   04/10/18 126/78   18 132/80   18 103/53    Weight from Last 3 Encounters:   04/10/18 131 lb (59.4 kg)   18 131 lb (59.4 kg)   17 135 lb (61.2 kg)              We Performed the Following     DERMATOLOGY REFERRAL     [facility], HAND, AND CHIR[facility]RACTIC REFERRAL        Primary Care Provider Office Phone # Fax #    Dayday Alberts [phone]    "    [address] [facility] [address]        Equal Access to Services     DAVID BENITEZ : Hadii aad ku hadmanuelitoraissa Amandachapincito, waannamariada lueleanormerylha, qaagatata eranmajorjulien garaywendijulien, alesia botellojackieheriberto coronado. So [facility] [phone].    ATENCIÓN: Si habla español, tiene a bhatti disposición servicios gratuitos de asistencia lingüística. Llame al [phone].    We comply with applicable federal civil rights laws and [place] laws. We do not discriminate on the basis of race, color, national origin, age, disability, sex, sexual orientation, or gender identity.            Thank you!     Thank you for choosing [facility]  for your care. Our goal is always to provide you with excellent care. Hearing back from our patients is one way we can continue to improve our services. Please take a few minutes to complete the written survey that you may receive in the mail after your visit with us. Thank you!             Your Updated Medication List - Protect others around you: Learn how to safely use, store and throw away your medicines at www.disposemymeds.org.          This list is accurate as of 4/10/18  9:57 AM.  Always use your most recent med list.                   Brand Name Dispense Instructions for use Diagnosis    amLODIPine 2.5 MG tablet    NORVASC    90 tablet    Take 1 tablet (2.5 mg) by mouth daily    Hypertension goal BP (blood pressure) < 140/90       B-12 1000 MCG Tbcr     100 tablet    Take 1,000 mcg by mouth daily    Cognitive impairment       BOOST BREEZE Liqd     90 each    Take 1 Can by mouth 3 times daily (with meals)    Cachexia (H)       Calcium carb-Vitamin D 500 mg [place]-200 units 500-200 MG-UNIT per tablet    OSCAL with D;Oyster Shell Calcium    90 tablet    Take 1 tablet by mouth daily    Chronic lower back pain       cholecalciferol 1000 UNIT tablet    vitamin D3    90 tablet    Take 1 tablet (1,000 Units) by mouth daily    Vitamin D deficiency       ferrous gluconate 325  (36 FE) MG Tabs     180 tablet    Take 1 tablet by mouth 2 times daily    Iron deficiency anemia, unspecified       finasteride 5 MG tablet    PROSCAR    90 tablet    Take 1 tablet (5 mg) by mouth daily    Benign non-nodular prostatic hyperplasia with lower urinary tract symptoms       gabapentin 100 MG capsule    NEURONTIN    60 capsule    1-2 BY MOUTH AT BEDTIME AS NEEDED FOR SLEEP    Insomnia, unspecified type       lidocaine 5 % ointment    XYLOCAINE    142 g    One application 4 x daily to affected areas lower back and knees if necessary    Lesion of sciatic nerve, left       Menthol 10 % Aero     1 each    Externally apply 1 Dose topically 2 times daily as needed    Chronic low back pain, unspecified back pain laterality, with sciatica presence unspecified       MULTIvitamin  S Caps     90 capsule    Take 1 tablet by mouth daily    Iron deficiency anemia, unspecified       omeprazole 20 MG CR capsule    priLOSEC    180 capsule    TAKE 1 CAPSULE (20 MG) BY MOUTH 2 TIMES DAILY    Gastroesophageal reflux disease without esophagitis       oxybutynin 5 MG tablet    DITROPAN    90 tablet    Take 1 tablet (5 mg) by mouth 3 times daily    Overactive bladder       oxyCODONE IR 5 MG tablet    ROXICODONE    80 tablet    Take 1 tablet (5 mg) by mouth every 12 hours as needed for moderate to severe pain    Chronic pain syndrome, Spinal stenosis in cervical region       polyethylene glycol Packet    MIRALAX/GLYCOLAX    30 packet    Take 34 g by mouth daily    Thoracic spondylosis with myelopathy       pravastatin 40 MG tablet    PRAVACHOL    90 tablet    TAKE 1 TABLET (40 MG) BY MOUTH DAILY    Pure hypercholesterolemia       PROAIR  (90 BASE) MCG/ACT Inhaler   Generic drug:  albuterol     8.5 Inhaler    INHALE 2 PUFFS INTO THE LUNGS EVERY 6 HOURS AS NEEDED FOR SHORTNESS OF BREATH / DYSPNEA OR WHEEZING    Dyspnea, unspecified type       senna-docusate 8.6-50 MG per tablet    SENOKOT-S;PERICOLACE    60 tablet    Take 1  tablet by mouth 2 times daily    Chronic pain syndrome       tamsulosin 0.4 MG capsule    FLOMAX    180 capsule    TAKE 1 CAPSULE (0.4 MG) BY MOUTH TWO TIMES DAILY    Benign non-nodular prostatic hyperplasia with lower urinary tract symptoms

## 2018-04-10 NOTE — PATIENT INSTRUCTIONS
Advise active stretching and strengthening program    Physical therapy, call for appointment at a location near your home    Continue same meds for now    Use hydrocortisone cream 2x daily over the counter to affected area on abdomen, and moisturizing lotion    If rash area not resolving, see dermatology

## 2018-04-11 ENCOUNTER — TELEPHONE (OUTPATIENT)
Dept: FAMILY MEDICINE | Facility: CLINIC | Age: 78
End: 2018-04-11

## 2018-04-11 NOTE — TELEPHONE ENCOUNTER
Attempt # 1  Called patient at home number.[phone] (home  Was call answered?  Yes, having been scheduled for PT several months ago - patient wondering if PT is billing his insurance. Phone kept fading in and out, patient voice sounded far away with a clicking noise. Nurse had to have patient repeat and then patient broke off call saying he will call back later.      Gretchen Marroquin RN  [facility]

## 2018-04-11 NOTE — LETTER
[address] [address] [phone]      April 17, 2018    Bhaskar Ott                                                                                                                     [address]    [facility] [address]            Dear Bhaskra,    We have tried to reach you by phone, but have been unable to connect with you.    We were returning your call about a billing question through Physical Therapy.  I would speak directly with their department to clarify that question.  You can call them directly at [phone].    Please call us at [phone] if you have any questions or if you need to make an appointment with your doctor.      Thank You      Sincerely,         Dayday Alberts MD  [facility]

## 2018-04-11 NOTE — TELEPHONE ENCOUNTER
Reason for Call:  Other Request     Detailed comments: Patient requesting to speak to pcp nurse in regards to referral for physical therapy that pcp refer patient to. Please advise.     Phone Number Patient can be reached at: Home number on file [phone] (home)    Best Time: Anytime    Can we leave a detailed message on this number? YES    Call taken on 4/11/2018 at 3:47 PM by Renee Verdin

## 2018-04-13 NOTE — TELEPHONE ENCOUNTER
Attempt # 2  Called patient at home number.  Was call answered?  No, left message to call nurse line at [phone]    Gretchen Marroquin RN  [facility]

## 2018-04-14 DIAGNOSIS — E78.00 PURE HYPERCHOLESTEROLEMIA: ICD-10-CM

## 2018-04-16 DIAGNOSIS — E78.00 PURE HYPERCHOLESTEROLEMIA: ICD-10-CM

## 2018-04-16 RX ORDER — PRAVASTATIN SODIUM 40 MG
TABLET ORAL
Qty: 90 TABLET | Refills: 0 | Status: SHIPPED | OUTPATIENT
Start: 2018-04-16 | End: 2018-07-17

## 2018-04-16 NOTE — TELEPHONE ENCOUNTER
"Requested Prescriptions   Pending Prescriptions Disp Refills     pravastatin (PRAVACHOL) 40 MG tablet [Pharmacy Med Name: PRAVASTATIN SODIUM 40 MG TAB] 90 tablet 0    Last Written Prescription Date:  1/5/18  Last Fill Quantity: 90,  # refills: 0   Last office visit: 4/10/2018 with prescribing provider:     Future Office Visit:     Sig: TAKE 1 TABLET (40 MG) BY MOUTH DAILY    Statins Protocol Passed    4/14/2018  1:35 AM       Passed - LDL on file in past 12 months    Recent Labs   Lab Test  06/23/17   1703   LDL  80            Passed - No abnormal creatine kinase in past 12 months    No lab results found.            Passed - Recent (12 mo) or future (30 days) visit within the authorizing provider's specialty    Patient had office visit in the last 12 months or has a visit in the next 30 days with authorizing provider or within the authorizing provider's specialty.  See \"Patient Info\" tab in inbasket, or \"Choose Columns\" in Meds & Orders section of the refill encounter.           Passed - Patient is age 18 or older          "

## 2018-04-16 NOTE — TELEPHONE ENCOUNTER
Attempt # 3  Called patient at home number.  Was call answered?  No, left message to call nurse line at [phone]     Gretchen Marroquin RN  [facility]

## 2018-04-16 NOTE — TELEPHONE ENCOUNTER
"Requested Prescriptions   Pending Prescriptions Disp Refills     pravastatin (PRAVACHOL) 40 MG tablet [Pharmacy Med Name: PRAVASTATIN SODIUM 40 MG TAB] 90 tablet 0    Last Written Prescription Date:  4-16-18  Last Fill Quantity: 90,  # refills: 0   Last office visit: 4/10/2018 with prescribing provider:     Future Office Visit:     Sig: TAKE 1 TABLET (40 MG) BY MOUTH DAILY    Statins Protocol Passed    4/16/2018 11:13 AM       Passed - LDL on file in past 12 months    Recent Labs   Lab Test  06/23/17   1703   LDL  80            Passed - No abnormal creatine kinase in past 12 months    No lab results found.            Passed - Recent (12 mo) or future (30 days) visit within the authorizing provider's specialty    Patient had office visit in the last 12 months or has a visit in the next 30 days with authorizing provider or within the authorizing provider's specialty.  See \"Patient Info\" tab in inbasket, or \"Choose Columns\" in Meds & Orders section of the refill encounter.           Passed - Patient is age 18 or older          "

## 2018-04-17 RX ORDER — PRAVASTATIN SODIUM 40 MG
TABLET ORAL
Qty: 90 TABLET | Refills: 0 | Status: SHIPPED | OUTPATIENT
Start: 2018-04-17 | End: 2018-08-24

## 2018-04-17 NOTE — TELEPHONE ENCOUNTER
No response from patient - to TC to send letter.    Gretchen Marroquin RN  [facility]

## 2018-04-20 ENCOUNTER — TRANSFERRED RECORDS (OUTPATIENT)
Dept: HEALTH INFORMATION MANAGEMENT | Facility: CLINIC | Age: 78
End: 2018-04-20

## 2018-04-23 ENCOUNTER — PATIENT OUTREACH (OUTPATIENT)
Dept: GERIATRIC MEDICINE | Facility: CLINIC | Age: 78
End: 2018-04-23

## 2018-04-23 NOTE — PROGRESS NOTES
[facility] Care Coordination Contact  CM notified client was in the ED at [facility] on 4/21 with diarrhea. CM left client a message requesting a call back to follow up on this.   Charlee Petersen RN  [facility]   [phone]    Received a call back from client today. States he is doing fair after his ED visit 4/21. Client stated he will call his PCP to set up a follow up appointment and doesn't need assistance to do this. No questions or concerns.  Charlee Petersen RN  [facility]   [phone]

## 2018-04-26 ENCOUNTER — TELEPHONE (OUTPATIENT)
Dept: FAMILY MEDICINE | Facility: CLINIC | Age: 78
End: 2018-04-26

## 2018-04-26 DIAGNOSIS — G47.00 INSOMNIA, UNSPECIFIED TYPE: ICD-10-CM

## 2018-04-26 RX ORDER — GABAPENTIN 100 MG/1
CAPSULE ORAL
Qty: 60 CAPSULE | Refills: 0 | Status: CANCELLED | OUTPATIENT
Start: 2018-04-26

## 2018-04-26 NOTE — TELEPHONE ENCOUNTER
Requested Prescriptions   Pending Prescriptions Disp Refills     gabapentin (NEURONTIN) 100 MG capsule 60 capsule 0    There is no refill protocol information for this order         Last Written Prescription Date:  4-12-18  Last Fill Quantity: 60,   # refills: 0  Last Office Visit: 4-10-18  Future Office visit:       Routing refill request to provider for review/approval because:  Drug not on the [facility] or [facility] refill protocol or controlled substance

## 2018-04-27 NOTE — TELEPHONE ENCOUNTER
We sent this in April 12, should last at least a month    Please inform patient    Dayday Alberts MD

## 2018-05-04 ENCOUNTER — OFFICE VISIT (OUTPATIENT)
Dept: FAMILY MEDICINE | Facility: CLINIC | Age: 78
End: 2018-05-04
Payer: COMMERCIAL

## 2018-05-04 VITALS
HEART RATE: 71 BPM | SYSTOLIC BLOOD PRESSURE: 102 MMHG | DIASTOLIC BLOOD PRESSURE: 60 MMHG | WEIGHT: 134 LBS | BODY MASS INDEX: 24.31 KG/M2 | TEMPERATURE: 98.7 F

## 2018-05-04 DIAGNOSIS — R19.7 DIARRHEA, UNSPECIFIED TYPE: Primary | ICD-10-CM

## 2018-05-04 DIAGNOSIS — I10 HYPERTENSION GOAL BP (BLOOD PRESSURE) < 140/90: ICD-10-CM

## 2018-05-04 PROCEDURE — 99213 OFFICE O/P EST LOW 20 MIN: CPT | Performed by: FAMILY MEDICINE

## 2018-05-04 NOTE — MR AVS SNAPSHOT
"              After Visit Summary   5/4/2018    Bhaskar Ott    MRN: [id number]           Patient Information     Date Of Birth          1940        Visit Information        Provider Department      5/4/2018 2:40 PM Dayday Alberts MD [facility]        Today's Diagnoses     Diarrhea, unspecified type    -  1      Care Instructions    Hold the loperamide ( diarrhea pill )    If diarrhea comes back, then collect samples for the lab tests ( stool tests ) and bring them back to clinic early next week    Continue other meds as is          Follow-ups after your visit        Future tests that were ordered for you today     Open Future Orders        Priority Expected Expires Ordered    Ova and Parasite Exam Routine Routine  5/4/2019 5/4/2018    Clostridium difficile Toxin B PCR Routine  6/3/2018 5/4/2018    Enteric Bacteria and Virus Panel by QUIANA Stool Routine  5/4/2019 5/4/2018            Who to contact     If you have questions or need follow up information about today's clinic visit or your schedule please contact [facility] directly at [phone].  Normal or non-critical lab and imaging results will be communicated to you by MyChart, letter or phone within 4 business days after the clinic has received the results. If you do not hear from us within 7 days, please contact the clinic through Carebasehart or phone. If you have a critical or abnormal lab result, we will notify you by phone as soon as possible.  Submit refill requests through Admeld or call your pharmacy and they will forward the refill request to us. Please allow 3 business days for your refill to be completed.          Additional Information About Your Visit        Carebasehart Information     Admeld lets you send messages to your doctor, view your test results, renew your prescriptions, schedule appointments and more. To sign up, go to [email or web address] . Click on \"Log in\" on the left side of the " "screen, which will take you to the Welcome page. Then click on \"Sign up Now\" on the right side of the page.     You will be asked to enter the access code listed below, as well as some personal information. Please follow the directions to create your username and password.     Your access code is: [email or web address]  Expires: 2018 11:59 AM     Your access code will  in 90 days. If you need help or a new code, please call your [place] clinic or [phone].        Care EveryWhere ID     This is your Care EveryWhere ID. This could be used by other organizations to access your [place] medical records  [id number]        Your Vitals Were     Pulse Temperature BMI (Body Mass Index)             71 98.7  F (37.1  C) (Oral) 24.31 kg/m2          Blood Pressure from Last 3 Encounters:   18 102/60   04/10/18 126/78   18 132/80    Weight from Last 3 Encounters:   18 134 lb (60.8 kg)   04/10/18 131 lb (59.4 kg)   18 131 lb (59.4 kg)              We Performed the Following     PAF COMPLETED        Primary Care Provider Office Phone # Fax #    Dayday Alberts [phone]       [address] [facility] [address]        Equal Access to Services     [facility] : Hadii aad ku hadasho Someliaali, waaxda luqadaha, qaybta kaalmada adeelizabethyada, alesia frye . So [facility] [place] [phone].    ATENCIÓN: Si habla español, tiene a bhatti disposición servicios gratuitos de asistencia lingüística. Llame al [phone].    We comply with applicable federal civil rights laws and [place] laws. We do not discriminate on the basis of race, color, national origin, age, disability, sex, sexual orientation, or gender identity.            Thank you!     Thank you for choosing [facility]  for your care. Our goal is always to provide you with excellent care. Hearing back from our patients is one way we can continue to improve our services. Please take a " few minutes to complete the written survey that you may receive in the mail after your visit with us. Thank you!             Your Updated Medication List - Protect others around you: Learn how to safely use, store and throw away your medicines at www.disposemymeds.org.          This list is accurate as of 5/4/18  3:09 PM.  Always use your most recent med list.                   Brand Name Dispense Instructions for use Diagnosis    amLODIPine 2.5 MG tablet    NORVASC    90 tablet    Take 1 tablet (2.5 mg) by mouth daily    Hypertension goal BP (blood pressure) < 140/90       B-12 1000 MCG Tbcr     100 tablet    Take 1,000 mcg by mouth daily    Cognitive impairment       BOOST BREEZE Liqd     90 each    Take 1 Can by mouth 3 times daily (with meals)    Cachexia (H)       Calcium carb-Vitamin D 500 mg [place]-200 units 500-200 MG-UNIT per tablet    OSCAL with D;Oyster Shell Calcium    90 tablet    Take 1 tablet by mouth daily    Chronic lower back pain       cholecalciferol 1000 UNIT tablet    vitamin D3    90 tablet    Take 1 tablet (1,000 Units) by mouth daily    Vitamin D deficiency       ferrous gluconate 325 (36 Fe) MG Tabs     180 tablet    Take 1 tablet by mouth 2 times daily    Iron deficiency anemia, unspecified       finasteride 5 MG tablet    PROSCAR    90 tablet    Take 1 tablet (5 mg) by mouth daily    Benign non-nodular prostatic hyperplasia with lower urinary tract symptoms       gabapentin 100 MG capsule    NEURONTIN    60 capsule    TAKE 1-2 BY MOUTH AT BEDTIME AS NEEDED FOR SLEEP    Insomnia, unspecified type       lidocaine 5 % ointment    XYLOCAINE    142 g    One application 4 x daily to affected areas lower back and knees if necessary    Lesion of sciatic nerve, left       Menthol 10 % Aero     1 each    Externally apply 1 Dose topically 2 times daily as needed    Chronic low back pain, unspecified back pain laterality, with sciatica presence unspecified       MULTIvitamin  S Caps     90 capsule     Take 1 tablet by mouth daily    Iron deficiency anemia, unspecified       omeprazole 20 MG CR capsule    priLOSEC    180 capsule    TAKE 1 CAPSULE (20 MG) BY MOUTH 2 TIMES DAILY    Gastroesophageal reflux disease without esophagitis       oxybutynin 5 MG tablet    DITROPAN    90 tablet    Take 1 tablet (5 mg) by mouth 3 times daily    Overactive bladder       oxyCODONE IR 5 MG tablet    ROXICODONE    80 tablet    Take 1 tablet (5 mg) by mouth every 12 hours as needed for moderate to severe pain    Chronic pain syndrome, Spinal stenosis in cervical region       polyethylene glycol Packet    MIRALAX/GLYCOLAX    30 packet    Take 34 g by mouth daily    Thoracic spondylosis with myelopathy       * pravastatin 40 MG tablet    PRAVACHOL    90 tablet    TAKE 1 TABLET (40 MG) BY MOUTH DAILY    Pure hypercholesterolemia       * pravastatin 40 MG tablet    PRAVACHOL    90 tablet    TAKE 1 TABLET (40 MG) BY MOUTH DAILY    Pure hypercholesterolemia       * PROAIR  (90 Base) MCG/ACT Inhaler   Generic drug:  albuterol     8.5 Inhaler    INHALE 2 PUFFS INTO THE LUNGS EVERY 6 HOURS AS NEEDED FOR SHORTNESS OF BREATH / DYSPNEA OR WHEEZING    Dyspnea, unspecified type       * PROAIR  (90 Base) MCG/ACT Inhaler   Generic drug:  albuterol     8.5 Inhaler    INHALE 2 PUFFS INTO THE LUNGS EVERY 6 HOURS AS NEEDED FOR SHORTNESS OF BREATH / DYSPNEA OR WHEEZING    Dyspnea, unspecified type       senna-docusate 8.6-50 MG per tablet    SENOKOT-S;PERICOLACE    60 tablet    Take 1 tablet by mouth 2 times daily    Chronic pain syndrome       tamsulosin 0.4 MG capsule    FLOMAX    180 capsule    TAKE 1 CAPSULE (0.4 MG) BY MOUTH TWO TIMES DAILY    Benign non-nodular prostatic hyperplasia with lower urinary tract symptoms       * Notice:  This list has 4 medication(s) that are the same as other medications prescribed for you. Read the directions carefully, and ask your doctor or other care provider to review them with you.

## 2018-05-04 NOTE — PATIENT INSTRUCTIONS
Hold the loperamide ( diarrhea pill )    If diarrhea comes back, then collect samples for the lab tests ( stool tests ) and bring them back to clinic early next week    Continue other meds as is

## 2018-05-04 NOTE — PROGRESS NOTES
SUBJECTIVE:   Bhaskar Ott is a 77 year old male who presents to clinic today for the following health issues:       ED/UC Followup:    Facility:  [facility]  Date of visit: 04/21/2018  Reason for visit: diarrhea  Current Status: the medication given was helpful for a couple of days but he is now having diarrhea again         none    Problem list and histories reviewed & adjusted, as indicated.  Additional history: as documented         Reviewed and updated as needed this visit by clinical staff  Allergies  Meds  Problems       Reviewed and updated as needed this visit by Provider          reviewed the emergency room note in detail    Had bm this am; in the beginning was solid, then some diarrhea    Patient did use the imodium for the 3-4 days after emergency room visit ( 8 pills imodium )    Then patient got over the counter med    loperamides    Took 2 the 1st day he got it    Package of loperamide 2mg had 24 pills in it.  17 left      Eating fine drinking fine    Urinating no change    Trying to stay hydrated    No bloody or black    Having some abd pain    Gets cramps in abd area and that is the signal that he has to go have bm/ diarrhea    Intermittent diarrhea    Full physical not done     Mentation and affect are fine    No tremor of speech or extremity    abd soft nontender    No back or costovertebral angle tenderness      ASSESSMENT / PLAN:  (R19.7) Diarrhea, unspecified type  (primary encounter diagnosis)  Comment: given duration of diarrhea, patient given containers to do lab tests.  Await results.  Plan: Ova and Parasite Exam Routine, Clostridium         difficile Toxin B PCR, Enteric Bacteria and         Virus Panel by QUIANA Stool             (I10) Hypertension goal BP (blood pressure) < 140/90  Comment: okay here   Plan: no change in meds       I reviewed the patient's medications, allergies, medical history, family history, and social history.    Dayday Alberts MD           no

## 2018-05-05 ENCOUNTER — NURSE TRIAGE (OUTPATIENT)
Dept: NURSING | Facility: CLINIC | Age: 78
End: 2018-05-05

## 2018-05-05 PROCEDURE — 87177 OVA AND PARASITES SMEARS: CPT | Performed by: FAMILY MEDICINE

## 2018-05-05 PROCEDURE — 87493 C DIFF AMPLIFIED PROBE: CPT | Performed by: FAMILY MEDICINE

## 2018-05-05 PROCEDURE — 87209 SMEAR COMPLEX STAIN: CPT | Performed by: FAMILY MEDICINE

## 2018-05-05 NOTE — TELEPHONE ENCOUNTER
Patient has collected stool specimens that were ordered by his doctor. Asks when he should bring them to the clinic. Chart Review was checked. It indicates that the patient should bring the specimens to the clinic early next week. Nurse told patient to bring the specimens to the clinic on 5-7-18.    Leona Miller RN/[facility]    Additional Information    Negative: [1] Caller is not with the adult (patient) AND [2] reporting urgent symptoms    Negative: Lab result questions    Negative: Medication questions    Negative: Caller cannot be reached by phone    Negative: Caller has already spoken to PCP or another triager    Negative: RN needs further essential information from caller in order to complete triage    Negative: Requesting regular office appointment    Negative: [1] Caller requesting NON-URGENT health information AND [2] PCP's office is the best resource    General information question, no triage required and triager able to answer question    Negative: Health Information question, no triage required and triager able to answer question    Protocols used: INFORMATION ONLY [facility]

## 2018-05-07 DIAGNOSIS — R19.7 DIARRHEA, UNSPECIFIED TYPE: ICD-10-CM

## 2018-05-07 LAB
C DIFF TOX B STL QL: NEGATIVE
SPECIMEN SOURCE: NORMAL

## 2018-05-07 NOTE — LETTER
[facility]  [address] [place]  [address]  [phone]        May 9, 2018    Bhaskar Ott  [address]    [facility] [address]        Dear Bhaskar,    These 2 stool tests are normal.  Make sure you complete/turn in the 3rd one.    Results for orders placed or performed in visit on 05/07/18   Ova and Parasite Exam Routine   Result Value Ref Range    Specimen Description Feces     Ova and Parasite Exam Routine parasitology exam negative     Ova and Parasite Exam       Cryptosporidium, Cyclospora, and Microsporidia are not readily detected by this method. A   single negative specimen does not rule out parasitic infection.     Clostridium difficile Toxin B PCR   Result Value Ref Range    Specimen Description Feces     C Diff Toxin B PCR Negative NEG^Negative       If you have any questions please call the clinic at [phone].    Sincerely,    Dayday Alberts MD  [facility]

## 2018-05-08 LAB
O+P STL MICRO: NORMAL
O+P STL MICRO: NORMAL
SPECIMEN SOURCE: NORMAL

## 2018-05-14 ENCOUNTER — PATIENT OUTREACH (OUTPATIENT)
Dept: GERIATRIC MEDICINE | Facility: CLINIC | Age: 78
End: 2018-05-14

## 2018-05-14 DIAGNOSIS — I10 BENIGN ESSENTIAL HYPERTENSION: ICD-10-CM

## 2018-05-14 NOTE — PROGRESS NOTES
[facility] Care Coordination Contact  Received notice that member had an ER visit at [facility] on May 11 for diarrhea. Called member to see how he was doing and he said that he was still having the problem. Asked member what the ER had recommended for him to do and member said he was told to contact his PCP. Member does not have a follow up appt with his PCP yet so offered to make an appt for him. Member said that he has some other things that he needs to do first before he can make this appt. Did inform member that we could call today for an appt with PCP for later in the week and member declined and said he will call for his own appt when he was ready to. No further assistance needed per member and asked him to call this CM today, or regular CM tomorrow when she was back if he thought of anything.  Erika Voss RN, PHN, [facility]   [phone]

## 2018-05-14 NOTE — TELEPHONE ENCOUNTER
"Requested Prescriptions   Pending Prescriptions Disp Refills     amLODIPine (NORVASC) 5 MG tablet [Pharmacy Med Name: AMLODIPINE BESYLATE 5 MG TAB] 90 tablet 1    Last Written Prescription Date:  2/13/18  Last Fill Quantity: 90,  # refills: 3   Last office visit: 5/4/2018 with prescribing provider:     Future Office Visit:     Sig: TAKE 1 TABLET (5 MG) BY MOUTH DAILY    Calcium Channel Blockers Protocol  Passed    5/14/2018  1:46 AM       Passed - Blood pressure under 140/90 in past 12 months    BP Readings from Last 3 Encounters:   05/04/18 102/60   04/10/18 126/78   04/09/18 132/80                Passed - Recent (12 mo) or future (30 days) visit within the authorizing provider's specialty    Patient had office visit in the last 12 months or has a visit in the next 30 days with authorizing provider or within the authorizing provider's specialty.  See \"Patient Info\" tab in inbasket, or \"Choose Columns\" in Meds & Orders section of the refill encounter.           Passed - Patient is age 18 or older       Passed - Normal serum creatinine on file in past 12 months    Recent Labs   Lab Test  06/23/17   1703   CR  1.14               "

## 2018-05-15 RX ORDER — AMLODIPINE BESYLATE 5 MG/1
TABLET ORAL
Qty: 90 TABLET | Refills: 1 | Status: SHIPPED | OUTPATIENT
Start: 2018-05-15 | End: 2018-11-07

## 2018-05-15 NOTE — TELEPHONE ENCOUNTER
Prescription approved per [facility] Refill Protocol.  Gretchen Marroquin RN  [facility]

## 2018-05-21 ENCOUNTER — TRANSFERRED RECORDS (OUTPATIENT)
Dept: HEALTH INFORMATION MANAGEMENT | Facility: CLINIC | Age: 78
End: 2018-05-21

## 2018-05-23 ENCOUNTER — OFFICE VISIT (OUTPATIENT)
Dept: FAMILY MEDICINE | Facility: CLINIC | Age: 78
End: 2018-05-23
Payer: COMMERCIAL

## 2018-05-23 VITALS
DIASTOLIC BLOOD PRESSURE: 69 MMHG | SYSTOLIC BLOOD PRESSURE: 118 MMHG | HEART RATE: 75 BPM | TEMPERATURE: 98.7 F | BODY MASS INDEX: 24.31 KG/M2 | WEIGHT: 134 LBS

## 2018-05-23 DIAGNOSIS — G47.00 INSOMNIA, UNSPECIFIED TYPE: ICD-10-CM

## 2018-05-23 DIAGNOSIS — R19.7 DIARRHEA, UNSPECIFIED TYPE: ICD-10-CM

## 2018-05-23 DIAGNOSIS — Z86.0100 HISTORY OF COLONIC POLYPS: Primary | ICD-10-CM

## 2018-05-23 DIAGNOSIS — R10.84 ABDOMINAL PAIN, GENERALIZED: ICD-10-CM

## 2018-05-23 PROCEDURE — 99214 OFFICE O/P EST MOD 30 MIN: CPT | Performed by: FAMILY MEDICINE

## 2018-05-23 ASSESSMENT — PAIN SCALES - GENERAL: PAINLEVEL: NO PAIN (0)

## 2018-05-23 NOTE — PROGRESS NOTES
SUBJECTIVE:   Bhaskar Ott is a 77 year old male who presents to clinic today for the following health issues:       ED/UC Followup:    Facility:  [facility]   Date of visit: 04/21/2018 and 05/11/2018  Reason for visit: Diarrhea  Current Status: stable         none    Problem list and histories reviewed & adjusted, as indicated.  Additional history: as documented         Reviewed and updated as needed this visit by clinical staff  Tobacco  Allergies  Meds  Problems  Med Hx  Surg Hx  Fam Hx  Soc Hx        Reviewed and updated as needed this visit by Provider          patient taking boost      Reviewed emergency room notes in detail    From both visits    Last diarrhea was about a week ago    Stool normal recently    No bloody or black stools    Full physical not done     Mentation and affect are fine    No tremor of speech or extremity    abd soft nontender    Reviewed past labs     Wt stable recently    ASSESSMENT / PLAN:  (Z86.010) History of colonic polyps  (primary encounter diagnosis)  Comment: given past polyps, frequent diarrhea, and abd pain, prudent to do scope exams  Plan: GASTROENTEROLOGY ADULT REF PROCEDURE ONLY [facility] [phone]; [facility]         Surgery        Patient to call and schedule ; discussed in detail     (R19.7) Diarrhea, unspecified type  Comment: patient has one more container to do   Plan: GASTROENTEROLOGY ADULT REF PROCEDURE ONLY [facility] [phone]; [facility]         Surgery             (R10.84) Abdominal pain, generalized  Comment: as above   Plan: GASTROENTEROLOGY ADULT REF PROCEDURE ONLY [facility] [phone]; [facility]         Surgery             (G47.00) Insomnia, unspecified type  Comment: discussed in detail .  Patient requested a prescription sleeping pill like ambien but I do not feel that is appropriate.  He already is on opiates from pain clinic.   Plan: continue melatonin.  Stressed  non - med treatments for insomnia.  He is not tired during day.  May just need less sleep than used to.       I reviewed the patient's medications, allergies, medical history, family history, and social history.    Dayday Alberts MD

## 2018-05-23 NOTE — PATIENT INSTRUCTIONS
If you get any more diarrhea, complete the remaining test from.  ( container at home )    Advise getting the scope exams ( we put in order for both upper and lower scope exams to be done same day ) .  You can call to schedule    Advise staying on Boost one bottle 2x daily

## 2018-05-23 NOTE — MR AVS SNAPSHOT
After Visit Summary   5/23/2018    Bhaskar Ott    MRN: [id number]           Patient Information     Date Of Birth          1940        Visit Information        Provider Department      5/23/2018 2:20 PM Dayday Alberts MD [facility]        Today's Diagnoses     History of colonic polyps    -  1    Diarrhea, unspecified type        Abdominal pain, generalized          Care Instructions    If you get any more diarrhea, complete the remaining test from.  ( container at home )    Advise getting the scope exams ( we put in order for both upper and lower scope exams to be done same day ) .  You can call to schedule    Advise staying on Boost one bottle 2x daily                Follow-ups after your visit        Additional Services     GASTROENTEROLOGY ADULT REF PROCEDURE ONLY [facility] [phone]; [facility]       Last Lab Result: Creatinine (mg/dL)       Date                     Value                 06/23/2017               1.14             ----------  Body mass index is 24.31 kg/(m^2).     Needed:  No  Language:  English    Patient will be contacted to schedule procedure.     Please be aware that coverage of these services is subject to the terms and limitations of your health insurance plan.  Call member services at your health plan with any benefit or coverage questions.  Any procedures must be performed at a [facility] facility OR coordinated by your clinic's referral office.    Please bring the following with you to your appointment:    (1) Any X-Rays, CTs or MRIs which have been performed.  Contact the facility where they were done to arrange for  prior to your scheduled appointment.    (2) List of current medications   (3) This referral request   (4) Any documents/labs given to you for this referral                  Who to contact     If you have questions or need follow up information about today's clinic visit or your  "schedule please contact [facility] directly at [phone].  Normal or non-critical lab and imaging results will be communicated to you by MyChart, letter or phone within 4 business days after the clinic has received the results. If you do not hear from us within 7 days, please contact the clinic through MyChart or phone. If you have a critical or abnormal lab result, we will notify you by phone as soon as possible.  Submit refill requests through [employer] or call your pharmacy and they will forward the refill request to us. Please allow 3 business days for your refill to be completed.          Additional Information About Your Visit        [employer] Information     [employer] lets you send messages to your doctor, view your test results, renew your prescriptions, schedule appointments and more. To sign up, go to [email or web address] . Click on \"Log in\" on the left side of the screen, which will take you to the Welcome page. Then click on \"Sign up Now\" on the right side of the page.     You will be asked to enter the access code listed below, as well as some personal information. Please follow the directions to create your username and password.     Your access code is: [email or web address]  Expires: 2018  7:07 AM     Your access code will  in 90 days. If you need help or a new code, please call your [facility] clinic or [phone].        Care EveryWhere ID     This is your Care EveryWhere ID. This could be used by other organizations to access your [facility] medical records  [id number]        Your Vitals Were     Pulse Temperature BMI (Body Mass Index)             75 98.7  F (37.1  C) (Oral) 24.31 kg/m2          Blood Pressure from Last 3 Encounters:   18 118/69   18 102/60   04/10/18 126/78    Weight from Last 3 Encounters:   18 134 lb (60.8 kg)   18 134 lb (60.8 kg)   04/10/18 131 lb (59.4 kg)              We Performed the Following     GASTROENTEROLOGY ADULT REF " PROCEDURE ONLY [facility] [phone]; [facility]     PAF COMPLETED        Primary Care Provider Office Phone # Fax #    Dayday Alberts [phone]       [address] [facility] [address]        Equal Access to Services     DAVID BENITEZ : Hadii aad ku hadasho Soomaali, waaxda luqadaha, qaybta kaalmada adeegyada, waxcordell baldwin haypelon botellojackieheriberto coronado. So [facility] [phone].    ATENCIÓN: Si habla español, tiene a bhatti disposición servicios gratuitos de asistencia lingüística. Llame al [phone].    We comply with applicable federal civil rights laws and [place] laws. We do not discriminate on the basis of race, color, national origin, age, disability, sex, sexual orientation, or gender identity.            Thank you!     Thank you for choosing [facility]  for your care. Our goal is always to provide you with excellent care. Hearing back from our patients is one way we can continue to improve our services. Please take a few minutes to complete the written survey that you may receive in the mail after your visit with us. Thank you!             Your Updated Medication List - Protect others around you: Learn how to safely use, store and throw away your medicines at www.disposemymeds.org.          This list is accurate as of 5/23/18  2:54 PM.  Always use your most recent med list.                   Brand Name Dispense Instructions for use Diagnosis    * amLODIPine 2.5 MG tablet    NORVASC    90 tablet    Take 1 tablet (2.5 mg) by mouth daily    Hypertension goal BP (blood pressure) < 140/90       * amLODIPine 5 MG tablet    NORVASC    90 tablet    TAKE 1 TABLET (5 MG) BY MOUTH DAILY    Benign essential hypertension       B-12 1000 MCG Tbcr     100 tablet    Take 1,000 mcg by mouth daily    Cognitive impairment       BOOST BREEZE Liqd     90 each    Take 1 Can by mouth 3 times daily (with meals)    Cachexia (H)       Calcium carb-Vitamin D  500 mg [place]-200 units 500-200 MG-UNIT per tablet    OSCAL with D;Oyster Shell Calcium    90 tablet    Take 1 tablet by mouth daily    Chronic lower back pain       cholecalciferol 1000 UNIT tablet    vitamin D3    90 tablet    Take 1 tablet (1,000 Units) by mouth daily    Vitamin D deficiency       ferrous gluconate 325 (36 Fe) MG Tabs     180 tablet    Take 1 tablet by mouth 2 times daily    Iron deficiency anemia, unspecified       finasteride 5 MG tablet    PROSCAR    90 tablet    Take 1 tablet (5 mg) by mouth daily    Benign non-nodular prostatic hyperplasia with lower urinary tract symptoms       * gabapentin 100 MG capsule    NEURONTIN    60 capsule    TAKE 1-2 BY MOUTH AT BEDTIME AS NEEDED FOR SLEEP    Insomnia, unspecified type       * gabapentin 100 MG capsule    NEURONTIN    60 capsule    TAKE 1-2 BY MOUTH AT BEDTIME AS NEEDED FOR SLEEP    Insomnia, unspecified type       lidocaine 5 % ointment    XYLOCAINE    142 g    One application 4 x daily to affected areas lower back and knees if necessary    Lesion of sciatic nerve, left       Menthol 10 % Aero     1 each    Externally apply 1 Dose topically 2 times daily as needed    Chronic low back pain, unspecified back pain laterality, with sciatica presence unspecified       MULTIvitamin  S Caps     90 capsule    Take 1 tablet by mouth daily    Iron deficiency anemia, unspecified       omeprazole 20 MG CR capsule    priLOSEC    180 capsule    TAKE 1 CAPSULE (20 MG) BY MOUTH 2 TIMES DAILY    Gastroesophageal reflux disease without esophagitis       oxybutynin 5 MG tablet    DITROPAN    90 tablet    Take 1 tablet (5 mg) by mouth 3 times daily    Overactive bladder       oxyCODONE IR 5 MG tablet    ROXICODONE    80 tablet    Take 1 tablet (5 mg) by mouth every 12 hours as needed for moderate to severe pain    Chronic pain syndrome, Spinal stenosis in cervical region       polyethylene glycol Packet    MIRALAX/GLYCOLAX    30 packet    Take 34 g by mouth daily     Thoracic spondylosis with myelopathy       * pravastatin 40 MG tablet    PRAVACHOL    90 tablet    TAKE 1 TABLET (40 MG) BY MOUTH DAILY    Pure hypercholesterolemia       * pravastatin 40 MG tablet    PRAVACHOL    90 tablet    TAKE 1 TABLET (40 MG) BY MOUTH DAILY    Pure hypercholesterolemia       * PROAIR  (90 Base) MCG/ACT Inhaler   Generic drug:  albuterol     8.5 Inhaler    INHALE 2 PUFFS INTO THE LUNGS EVERY 6 HOURS AS NEEDED FOR SHORTNESS OF BREATH / DYSPNEA OR WHEEZING    Dyspnea, unspecified type       * PROAIR  (90 Base) MCG/ACT Inhaler   Generic drug:  albuterol     8.5 Inhaler    INHALE 2 PUFFS INTO THE LUNGS EVERY 6 HOURS AS NEEDED FOR SHORTNESS OF BREATH / DYSPNEA OR WHEEZING    Dyspnea, unspecified type       senna-docusate 8.6-50 MG per tablet    SENOKOT-S;PERICOLACE    60 tablet    Take 1 tablet by mouth 2 times daily    Chronic pain syndrome       tamsulosin 0.4 MG capsule    FLOMAX    180 capsule    TAKE 1 CAPSULE (0.4 MG) BY MOUTH TWO TIMES DAILY    Benign non-nodular prostatic hyperplasia with lower urinary tract symptoms       * Notice:  This list has 8 medication(s) that are the same as other medications prescribed for you. Read the directions carefully, and ask your doctor or other care provider to review them with you.

## 2018-06-13 ENCOUNTER — PATIENT OUTREACH (OUTPATIENT)
Dept: GERIATRIC MEDICINE | Facility: CLINIC | Age: 78
End: 2018-06-13

## 2018-06-13 NOTE — PROGRESS NOTES
[facility] Care Coordination Contact    [facility] Six-Month Telephone Assessment    6 month telephone assessment completed on 06/13/18.    ER visits: Yes -  [facility]  Hospitalizations: No  TCU stays: No  Significant health status changes: Client has been having trouble with diarrhea and needs colonoscopy and UGI endoscopy.   Falls/Injuries: No  ADL/IADL changes: No  Changes in services: Yes: Client is requesting someone to be with him after his colonoscopy. He states he has no one to be with him. CM called [facility] and they could provide a temporary HHA to be with client if needed. Client informed of this. He also states his walker needs repair. CM to check with [employer] regarding this.     Caregiver Assessment follow up:  N/A    Goals: See POC in chart for goal progress documentation.      Will see member in 6 months for an annual health risk assessment.   Encouraged member to call CC with any questions or concerns in the meantime.   Charlee Petersen RN  [facility]   [phone]

## 2018-06-15 NOTE — PROGRESS NOTES
[facility] Care Coordination Contact  CM contacted [facility] and was told client did not receive his walker from them. CM then contacted [facility] and was told client did get a walker form them in 2008. They will contact client to schedule a visit to see if they can repair the walker or replace it. Client notified that someone from [facility] would be calling him.  Charlee Petersen RN  [facility]   [phone]

## 2018-06-19 DIAGNOSIS — K21.9 GASTROESOPHAGEAL REFLUX DISEASE WITHOUT ESOPHAGITIS: ICD-10-CM

## 2018-06-19 NOTE — TELEPHONE ENCOUNTER
"Requested Prescriptions   Pending Prescriptions Disp Refills     omeprazole (PRILOSEC) 20 MG CR capsule [Pharmacy Med Name: OMEPRAZOLE DR 20 MG CAPSULE] 180 capsule 0    Last Written Prescription Date:  3/19/18  Last Fill Quantity: 180,  # refills: 0   Last office visit: 5/23/2018 with prescribing provider:     Future Office Visit:     Sig: TAKE 1 CAPSULE (20 MG) BY MOUTH 2 TIMES DAILY    PPI Protocol Passed    6/19/2018  1:48 AM       Passed - Not on Clopidogrel (unless Pantoprazole ordered)       Passed - No diagnosis of osteoporosis on record       Passed - Recent (12 mo) or future (30 days) visit within the authorizing provider's specialty    Patient had office visit in the last 12 months or has a visit in the next 30 days with authorizing provider or within the authorizing provider's specialty.  See \"Patient Info\" tab in inbasket, or \"Choose Columns\" in Meds & Orders section of the refill encounter.           Passed - Patient is age 18 or older          "

## 2018-06-21 ENCOUNTER — TRANSFERRED RECORDS (OUTPATIENT)
Dept: HEALTH INFORMATION MANAGEMENT | Facility: CLINIC | Age: 78
End: 2018-06-21

## 2018-06-22 ENCOUNTER — TELEPHONE (OUTPATIENT)
Dept: FAMILY MEDICINE | Facility: CLINIC | Age: 78
End: 2018-06-22

## 2018-06-22 DIAGNOSIS — Z98.1 S/P LAMINECTOMY WITH SPINAL FUSION: ICD-10-CM

## 2018-06-22 DIAGNOSIS — R53.1 WEAKNESS: ICD-10-CM

## 2018-06-22 DIAGNOSIS — M48.02 SPINAL STENOSIS IN CERVICAL REGION: Primary | ICD-10-CM

## 2018-06-22 NOTE — TELEPHONE ENCOUNTER
Reason for Call: Request for an order or referral:    Order or referral being requested: DME for 4 wheeled walker, with breaks and cushioned seat.    Date needed: at your convenience    Has the patient been seen by the PCP for this problem? YES    Additional comments: Patient calling to get an order for a replacement walker.  He will call back with which medical supply store to send it into.  Please call patient.    Phone number Patient can be reached at:  Home number on file [phone] (home)    Best Time:  any    Can we leave a detailed message on this number?  YES    Call taken on 6/22/2018 at 10:48 AM by Yael Melissa

## 2018-06-22 NOTE — TELEPHONE ENCOUNTER
Message left on home number for patient to call back TC line, [phone].  Where should we send it?    Yael NIELSEN

## 2018-06-26 ENCOUNTER — TELEPHONE (OUTPATIENT)
Dept: FAMILY MEDICINE | Facility: CLINIC | Age: 78
End: 2018-06-26
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
## 2018-06-27 NOTE — TELEPHONE ENCOUNTER
Date forms received: 6-27-18  Form completed as much as possible by Yael Melissa.  Forms placed: provider desk Date placed: 6-27-18  Yael Melissa

## 2018-06-28 DIAGNOSIS — G47.00 INSOMNIA, UNSPECIFIED TYPE: ICD-10-CM

## 2018-06-28 RX ORDER — GABAPENTIN 100 MG/1
CAPSULE ORAL
Qty: 60 CAPSULE | Refills: 0 | Status: SHIPPED | OUTPATIENT
Start: 2018-06-28 | End: 2018-07-24

## 2018-06-28 NOTE — TELEPHONE ENCOUNTER
Requested Prescriptions   Pending Prescriptions Disp Refills     gabapentin (NEURONTIN) 100 MG capsule [Pharmacy Med Name: GABAPENTIN 100 MG CAPSULE] 60 capsule 0     Sig: TAKE 1-2 BY MOUTH AT BEDTIME AS NEEDED FOR SLEEP    There is no refill protocol information for this order         Last Written Prescription Date:  5-14-18  Last Fill Quantity: 60,   # refills: 0  Last Office Visit:   Future Office visit:       Routing refill request to provider for review/approval because:  Drug not on the [facility] P or [facility] refill protocol or controlled substance

## 2018-07-02 ENCOUNTER — MEDICAL CORRESPONDENCE (OUTPATIENT)
Dept: HEALTH INFORMATION MANAGEMENT | Facility: CLINIC | Age: 78
End: 2018-07-02

## 2018-07-02 NOTE — TELEPHONE ENCOUNTER
Date forms retrieved from team basket: 18  Were forms completed/signed:  yes  Form was sent to: [facility] [employer] via: [phone].  Did patient request to be contacted when forms were complete: yes   Patient was contacted via: phone  Date: 18  Date sent to abstractin18  Yael Melissa

## 2018-07-02 NOTE — TELEPHONE ENCOUNTER
Reason for Call:  Other prescription    Detailed comments: patient calling wondering about the status of his walker RX. TC informed him that PCP has not had a chance to sign the forms yet.     Phone Number Patient can be reached at: Home number on file [phone] (home)    Best Time: any    Can we leave a detailed message on this number? YES    Call taken on 7/2/2018 at 12:52 PM by NIA ESPAÑA

## 2018-07-05 ENCOUNTER — PATIENT OUTREACH (OUTPATIENT)
Dept: GERIATRIC MEDICINE | Facility: CLINIC | Age: 78
End: 2018-07-05

## 2018-07-11 ENCOUNTER — TELEPHONE (OUTPATIENT)
Dept: FAMILY MEDICINE | Facility: CLINIC | Age: 78
End: 2018-07-11

## 2018-07-12 ENCOUNTER — MEDICAL CORRESPONDENCE (OUTPATIENT)
Dept: HEALTH INFORMATION MANAGEMENT | Facility: CLINIC | Age: 78
End: 2018-07-12

## 2018-07-17 DIAGNOSIS — E78.00 PURE HYPERCHOLESTEROLEMIA: ICD-10-CM

## 2018-07-17 NOTE — TELEPHONE ENCOUNTER
"Requested Prescriptions   Pending Prescriptions Disp Refills     pravastatin (PRAVACHOL) 40 MG tablet [Pharmacy Med Name: PRAVASTATIN SODIUM 40 MG TAB] 90 tablet 0    Last Written Prescription Date:  4/17/18  Last Fill Quantity: 90,  # refills: 0   Last office visit: No previous visit found with prescribing provider:     Future Office Visit:     Sig: TAKE 1 TABLET BY MOUTH EVERY DAY    Statins Protocol Failed    7/17/2018  1:53 AM       Failed - LDL on file in past 12 months    Recent Labs   Lab Test  06/23/17   1703   LDL  80            Passed - No abnormal creatine kinase in past 12 months    No lab results found.            Passed - Recent (12 mo) or future (30 days) visit within the authorizing provider's specialty    Patient had office visit in the last 12 months or has a visit in the next 30 days with authorizing provider or within the authorizing provider's specialty.  See \"Patient Info\" tab in inbasket, or \"Choose Columns\" in Meds & Orders section of the refill encounter.           Passed - Patient is age 18 or older          "

## 2018-07-18 RX ORDER — PRAVASTATIN SODIUM 40 MG
TABLET ORAL
Qty: 90 TABLET | Refills: 0 | Status: SHIPPED | OUTPATIENT
Start: 2018-07-18 | End: 2018-12-27

## 2018-07-18 NOTE — TELEPHONE ENCOUNTER
Last OV 5/23/18.    Routing refill request to provider for review/approval because:  Labs not current  Lilibeth Botello RN [facility] Triage.

## 2018-07-18 NOTE — TELEPHONE ENCOUNTER
Okay refill for 90 days but see us in the next 3 months and come in fasting for that clinic appointment    Please inform patient    Dayday Alberts MD

## 2018-07-19 NOTE — TELEPHONE ENCOUNTER
Message stated patient does not have a voicemail that has not been setup will try later. GIA Escalona

## 2018-07-20 ENCOUNTER — PATIENT OUTREACH (OUTPATIENT)
Dept: GERIATRIC MEDICINE | Facility: CLINIC | Age: 78
End: 2018-07-20

## 2018-07-20 NOTE — PROGRESS NOTES
[facility] Care Coordination Contact  CM received a call from client regarding status of his walker. CM reviewed EPIC notes and saw there were two forms filled out by the PCP, one was for [employer] and the other for [facility].. CM called [employer] and was told they still needed more information filled out on their form and had sent this in to the PCP today. CM then called [facility] and was told client had called them last week requesting a walker so they sent in forms to PCP and got approval from the insurance. Now  the walker is ready to be delivered and should go out next week. CM called client to inform him that since [facility] could get it to him sooner, CM approved this and he became upset with CM and stated he wanted the walker from [employer] and not [facility]. CM tried to explain to client we could only use one company and he stated he was done with this CM and hung up the phone. CM will notify [facility] that client might refuse the walker when they call to deliver it.   Charlee Petersen RN  [facility]   [phone]

## 2018-07-23 ENCOUNTER — TELEPHONE (OUTPATIENT)
Dept: FAMILY MEDICINE | Facility: CLINIC | Age: 78
End: 2018-07-23

## 2018-07-23 NOTE — TELEPHONE ENCOUNTER
Forms received from: [employer]   Phone number listed: [phone]   Fax listed: [phone]  Date received: 07/23/2018  Form description: Prescription-backrest  Once forms are completed, please return to [employer] via fax.  Is patient requesting to be contacted when forms are completed: NA    Form placed: In provider's basket  Charlee Vargas

## 2018-07-26 ENCOUNTER — PATIENT OUTREACH (OUTPATIENT)
Dept: GERIATRIC MEDICINE | Facility: CLINIC | Age: 78
End: 2018-07-26

## 2018-07-26 NOTE — PROGRESS NOTES
[facility] Care Coordination Contact  Call placed to client to follow up on request for walker (referrals placed with [facility] and [facility] [employer])  Client expressed frustration that referral was placed with [facility] and his request was [facility].  Client states that [facility] called him, he declined their service and called [employer], was told that the walker was denied. Explained that CM will f/u with [employer] [facility] to assist with walker referral. CM provided CM name and number.  Shea Mo RN, BC  Supervisor [facility]   [phone] (Fax)

## 2018-07-27 NOTE — PROGRESS NOTES
[facility] Care Coordination Contact  Call placed to client, left vm that [employer] has all the required documentation to complete the walker referral. Shared info on new CM.  Request a call back.  Shea Mo RN, BC  Supervisor [facility]   [phone] (Fax)

## 2018-07-28 DIAGNOSIS — N40.1 BENIGN NON-NODULAR PROSTATIC HYPERPLASIA WITH LOWER URINARY TRACT SYMPTOMS: ICD-10-CM

## 2018-07-30 ENCOUNTER — TELEPHONE (OUTPATIENT)
Dept: FAMILY MEDICINE | Facility: CLINIC | Age: 78
End: 2018-07-30

## 2018-07-30 RX ORDER — TAMSULOSIN HYDROCHLORIDE 0.4 MG/1
CAPSULE ORAL
Qty: 180 CAPSULE | Refills: 1 | Status: SHIPPED | OUTPATIENT
Start: 2018-07-30 | End: 2019-01-22

## 2018-07-30 NOTE — TELEPHONE ENCOUNTER
"Requested Prescriptions   Pending Prescriptions Disp Refills     tamsulosin (FLOMAX) 0.4 MG capsule [Pharmacy Med Name: TAMSULOSIN HCL 0.4 MG CAPSULE] 180 capsule 1    Last Written Prescription Date:  1/25/18  Last Fill Quantity: 180,  # refills: 1   Last office visit: No previous visit found with prescribing provider:     Future Office Visit:   Next 5 appointments (look out 90 days)     Oct 22, 2018  1:00 PM CDT   Office Visit with Dayday Alberts MD   [facility] ([facility]    [address] [place] [address] [id number] [phone]                  Sig: TAKE 1 CAPSULE (0.4 MG) BY MOUTH TWO TIMES DAILY    Alpha Blockers Passed    7/28/2018  1:47 AM       Passed - Blood pressure under 140/90 in past 12 months    BP Readings from Last 3 Encounters:   05/23/18 118/69   05/04/18 102/60   04/10/18 126/78                Passed - Recent (12 mo) or future (30 days) visit within the authorizing provider's specialty    Patient had office visit in the last 12 months or has a visit in the next 30 days with authorizing provider or within the authorizing provider's specialty.  See \"Patient Info\" tab in inbasket, or \"Choose Columns\" in Meds & Orders section of the refill encounter.           Passed - Patient does not have Tadalafil, Vardenafil, or Sildenafil on their medication list       Passed - Patient is 18 years of age or older          "

## 2018-07-30 NOTE — TELEPHONE ENCOUNTER
Forms received from: [employer]   Phone number listed:    Fax listed: [phone]  Date received: 07/30/2018  Form description: prescription for back rest  Once forms are completed, please return to [employer]  via fax.  Form placed: in providers folder  Irasema De Leon

## 2018-08-02 ENCOUNTER — PATIENT OUTREACH (OUTPATIENT)
Dept: GERIATRIC MEDICINE | Facility: CLINIC | Age: 78
End: 2018-08-02

## 2018-08-02 NOTE — PROGRESS NOTES
"[facility] Care Coordination Contact  8/1/18 Rec'd vm from client, inquiring on the situation with his walker. CM informed by support staff at [facility] that [employer] mailed client a form that he needs to complete, prior to sending him the walker. The form includes information that insurance may not cover the full cost of the walker  and client may be responsible.    8/1/18 Call placed to client, conference call to [facility], spoke with Bianca. Per Bianca, she did verify that [facility] has rec'd all the required PCP forms, waiting for [facility] to send the EW auth for the approval of the additional wheels.  Per [facility] they will be sending the auth to [facility] 8/1/18. Bianca states that they will set up the walker and complete a same day delivery once the auth has been rec'd from [facility]. [facility] to make arrangements with client. Bianca states that client will not need to complete any forms for [facility].   CM explained that insurance does not cover the cost of a 4 WW and client's CM authorized the additional cost of the 2 wheels. Client stated that he has a 2 WW.   Client inquired if CM had an opportunity to speak with his CM on why he requested to meet with her at [facility] [place]. Explained that this CM is not aware of the reasons why the meeting was at [facility] [place] vs his home.  Client stated that he does not want others to hear his conversation, \"the apt is not private, I can hear other people talking in the apartment next to me.\"   Client stated that during his initial meeting with his CM, he conveyed information about a problem that began in 2007 that still persists today.  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  Client stated that he has tried talking with several doctors and have not been given an " answer. Client stated that there was no medical reasons why the procedure was done.   Allowed client to discuss his concerns, enc'd client to share above information with his PCP.  This CM provided info on the role of the CM. Provided information on new CM assignment 8/1/18, with client's permission, CM to share above information.   8/1/18 Rec'd vm from client stating that he his old walker has 4 wheels. Client also inquired about Diabetes that CM discussed.   8/2/18 Left vm with client that CM rec'd his vm. Explained that CM used DM as an example of how a CM may work with a client and his PCP on reaching health care goals. Explained that CM reviewed above info with his new CM who will reach out to him later today. Request a call back as needed.  Shea Mo RN, BC  Supervisor [facility]   [phone] (Fax)

## 2018-08-06 ENCOUNTER — PATIENT OUTREACH (OUTPATIENT)
Dept: GERIATRIC MEDICINE | Facility: CLINIC | Age: 78
End: 2018-08-06

## 2018-08-06 NOTE — PROGRESS NOTES
[facility] Care Coordination Contact  Client assigned to me for case mgmt effective 8/1/18.  CM has left message for client to make sure he has my name and contact information. CM told client I reviewed his case with Shea Mo, RN Supervisor. Client is due for annual visit in December but told client if he has questions/concerns to call me before that.    Jenise Evangelista, CARMENCITA  [facility]   [phone]

## 2018-08-09 ENCOUNTER — PATIENT OUTREACH (OUTPATIENT)
Dept: GERIATRIC MEDICINE | Facility: CLINIC | Age: 78
End: 2018-08-09

## 2018-08-09 NOTE — PROGRESS NOTES
[facility] Care Coordination Contact  CM received message from client stating that he has not received his walker from [facility] yet.  CM has left 2 VM's (as wait times are very lengthy) and sent 1 email to [facility] [employer] customer service and has not received response yet. CM called client back and updated him that I have not heard back from [facility] Medical re: what the issue is with his walker order.    CARMENCITA Robertson  [facility] Care Coordinator  [phone]

## 2018-08-14 NOTE — PROGRESS NOTES
[facility] Care Coordination Contact  CM received message from Wendy at [employer] [phone] updating me that client received walker via  and signed for delivered on Thurs 8/9 at 1:36pm.    CARMENCITA Robertson  [facility] Care Coordinator  [phone]

## 2018-08-16 ENCOUNTER — PATIENT OUTREACH (OUTPATIENT)
Dept: GERIATRIC MEDICINE | Facility: CLINIC | Age: 78
End: 2018-08-16

## 2018-08-16 NOTE — PROGRESS NOTES
"[facility] Care Coordination Contact  CC received call from client.  He wanted to update me that he has not received money he is owed from [employer]. He says he paid $68 towards his walker and later insurance covered it so he is wanting his money back.  He says he called [facility] and got the \"run around\" and is wondering if I can assist. CC told him I will contact [employer] and try to get clarification on what is going on with the $68.  CC sent message to [employer] customer service and is awaiting response back.    CARMENCITA Robertson   [facility] Care Coordinator  [phone]    "

## 2018-08-20 ENCOUNTER — TRANSFERRED RECORDS (OUTPATIENT)
Dept: HEALTH INFORMATION MANAGEMENT | Facility: CLINIC | Age: 78
End: 2018-08-20

## 2018-08-20 NOTE — PROGRESS NOTES
[facility] Care Coordination Contact  CC received call from client [facility]  CC shared that I have not received a response to my email or VM as of this morning.  Client states he will call them again himself and take care of it.    CARMENCITA Robertson   [facility] Care Coordinator  [phone]

## 2018-08-24 ENCOUNTER — RADIANT APPOINTMENT (OUTPATIENT)
Dept: GENERAL RADIOLOGY | Facility: CLINIC | Age: 78
End: 2018-08-24
Attending: FAMILY MEDICINE
Payer: COMMERCIAL

## 2018-08-24 ENCOUNTER — OFFICE VISIT (OUTPATIENT)
Dept: FAMILY MEDICINE | Facility: CLINIC | Age: 78
End: 2018-08-24
Payer: COMMERCIAL

## 2018-08-24 VITALS
SYSTOLIC BLOOD PRESSURE: 102 MMHG | BODY MASS INDEX: 25.04 KG/M2 | HEART RATE: 72 BPM | TEMPERATURE: 98.5 F | WEIGHT: 138 LBS | DIASTOLIC BLOOD PRESSURE: 53 MMHG

## 2018-08-24 DIAGNOSIS — F43.9 STRESS: ICD-10-CM

## 2018-08-24 DIAGNOSIS — M79.642 PAIN OF LEFT HAND: Primary | ICD-10-CM

## 2018-08-24 DIAGNOSIS — S81.802A WOUND OF LEFT LOWER EXTREMITY, INITIAL ENCOUNTER: ICD-10-CM

## 2018-08-24 DIAGNOSIS — M79.642 PAIN OF LEFT HAND: ICD-10-CM

## 2018-08-24 DIAGNOSIS — I10 HYPERTENSION GOAL BP (BLOOD PRESSURE) < 140/90: ICD-10-CM

## 2018-08-24 PROCEDURE — 99213 OFFICE O/P EST LOW 20 MIN: CPT | Performed by: FAMILY MEDICINE

## 2018-08-24 PROCEDURE — 73130 X-RAY EXAM OF HAND: CPT | Mod: LT

## 2018-08-24 ASSESSMENT — ANXIETY QUESTIONNAIRES
IF YOU CHECKED OFF ANY PROBLEMS ON THIS QUESTIONNAIRE, HOW DIFFICULT HAVE THESE PROBLEMS MADE IT FOR YOU TO DO YOUR WORK, TAKE CARE OF THINGS AT HOME, OR GET ALONG WITH OTHER PEOPLE: EXTREMELY DIFFICULT
GAD7 TOTAL SCORE: 7
2. NOT BEING ABLE TO STOP OR CONTROL WORRYING: MORE THAN HALF THE DAYS
3. WORRYING TOO MUCH ABOUT DIFFERENT THINGS: SEVERAL DAYS
1. FEELING NERVOUS, ANXIOUS, OR ON EDGE: NOT AT ALL
6. BECOMING EASILY ANNOYED OR IRRITABLE: SEVERAL DAYS
5. BEING SO RESTLESS THAT IT IS HARD TO SIT STILL: NOT AT ALL
7. FEELING AFRAID AS IF SOMETHING AWFUL MIGHT HAPPEN: MORE THAN HALF THE DAYS

## 2018-08-24 ASSESSMENT — PAIN SCALES - GENERAL: PAINLEVEL: EXTREME PAIN (8)

## 2018-08-24 ASSESSMENT — PATIENT HEALTH QUESTIONNAIRE - PHQ9: 5. POOR APPETITE OR OVEREATING: SEVERAL DAYS

## 2018-08-24 NOTE — MR AVS SNAPSHOT
After Visit Summary   8/24/2018    Bhaskar Ott    MRN: [id number]           Patient Information     Date Of Birth          1940        Visit Information        Provider Department      8/24/2018 3:00 PM Dayday Alberts MD [facility]        Today's Diagnoses     Pain of left hand    -  1      Care Instructions    You do have arthritis in the affected joint but no fracture seen    Continue same medications    Increase walking as able    Use antibiotic ointment and bandage as needed    Talk with           Follow-ups after your visit        Your next 10 appointments already scheduled     Oct 22, 2018  1:00 PM CDT   Office Visit with Dayday Alberts MD   [facility] ([facility])    [address] [id number] [phone]           Bring a current list of meds and any records pertaining to this visit. For Physicals, please bring immunization records and any forms needing to be filled out. Please arrive 10 minutes early to complete paperwork.              Who to contact     If you have questions or need follow up information about today's clinic visit or your schedule please contact [facility] directly at [phone].  Normal or non-critical lab and imaging results will be communicated to you by MyChart, letter or phone within 4 business days after the clinic has received the results. If you do not hear from us within 7 days, please contact the clinic through MyChart or phone. If you have a critical or abnormal lab result, we will notify you by phone as soon as possible.  Submit refill requests through [employer] or call your pharmacy and they will forward the refill request to us. Please allow 3 business days for your refill to be completed.          Additional Information About Your Visit        Care EveryWhere ID     This is your Care EveryWhere ID. This  could be used by other organizations to access your [facility] medical records  [id number]        Your Vitals Were     Pulse Temperature BMI (Body Mass Index)             72 98.5  F (36.9  C) (Oral) 25.04 kg/m2          Blood Pressure from Last 3 Encounters:   08/24/18 102/53   05/23/18 118/69   05/04/18 102/60    Weight from Last 3 Encounters:   08/24/18 138 lb (62.6 kg)   05/23/18 134 lb (60.8 kg)   05/04/18 134 lb (60.8 kg)                 Today's Medication Changes          These changes are accurate as of 8/24/18  3:56 PM.  If you have any questions, ask your nurse or doctor.               These medicines have changed or have updated prescriptions.        Dose/Directions    amLODIPine 5 MG tablet   Commonly known as:  NORVASC   This may have changed:  Another medication with the same name was removed. Continue taking this medication, and follow the directions you see here.   Used for:  Benign essential hypertension   Changed by:  Dayday Alberts MD        TAKE 1 TABLET (5 MG) BY MOUTH DAILY   Quantity:  90 tablet   Refills:  1       gabapentin 100 MG capsule   Commonly known as:  NEURONTIN   This may have changed:  Another medication with the same name was removed. Continue taking this medication, and follow the directions you see here.   Used for:  Insomnia, unspecified type   Changed by:  Dayday Alberts MD        TAKE 1-2 BY MOUTH AT BEDTIME AS NEEDED FOR SLEEP   Quantity:  60 capsule   Refills:  3       pravastatin 40 MG tablet   Commonly known as:  PRAVACHOL   This may have changed:  Another medication with the same name was removed. Continue taking this medication, and follow the directions you see here.   Used for:  Pure hypercholesterolemia   Changed by:  Dayday Alberts MD        TAKE 1 TABLET BY MOUTH EVERY DAY   Quantity:  90 tablet   Refills:  0                Primary Care Provider Office Phone # Fax #    Dayday Alberts [phone] [address]  [place] [address]        Equal Access to Services     [facility] : Hadii nohelia devries john paulraissa Hermelindoali, waannamariada luqadaha, qaybta kaalmada jason, alesia coronado. So [facility] [phone].    ATENCIÓN: Si habla español, tiene a bhatti disposición servicios gratuitos de asistencia lingüística. Carly al [phone].    We comply with applicable federal civil rights laws and [place] laws. We do not discriminate on the basis of race, color, national origin, age, disability, sex, sexual orientation, or gender identity.            Thank you!     Thank you for choosing [facility]  for your care. Our goal is always to provide you with excellent care. Hearing back from our patients is one way we can continue to improve our services. Please take a few minutes to complete the written survey that you may receive in the mail after your visit with us. Thank you!             Your Updated Medication List - Protect others around you: Learn how to safely use, store and throw away your medicines at www.disposemymeds.org.          This list is accurate as of 8/24/18  3:56 PM.  Always use your most recent med list.                   Brand Name Dispense Instructions for use Diagnosis    amLODIPine 5 MG tablet    NORVASC    90 tablet    TAKE 1 TABLET (5 MG) BY MOUTH DAILY    Benign essential hypertension       B-12 1000 MCG Tbcr     100 tablet    Take 1,000 mcg by mouth daily    Cognitive impairment       BOOST BREEZE Liqd     90 each    Take 1 Can by mouth 3 times daily (with meals)    Cachexia (H)       calcium carbonate 500 mg-vitamin D 200 units 500-200 MG-UNIT per tablet    OSCAL with D;OYSTER SHELL CALCIUM    90 tablet    Take 1 tablet by mouth daily    Chronic lower back pain       cholecalciferol 1000 UNIT tablet    vitamin D3    90 tablet    Take 1 tablet (1,000 Units) by mouth daily    Vitamin D deficiency       ferrous gluconate 325 (36 Fe) MG Tabs     180 tablet    Take 1 tablet by mouth 2  times daily    Iron deficiency anemia, unspecified       finasteride 5 MG tablet    PROSCAR    90 tablet    Take 1 tablet (5 mg) by mouth daily    Benign non-nodular prostatic hyperplasia with lower urinary tract symptoms       gabapentin 100 MG capsule    NEURONTIN    60 capsule    TAKE 1-2 BY MOUTH AT BEDTIME AS NEEDED FOR SLEEP    Insomnia, unspecified type       lidocaine 5 % ointment    XYLOCAINE    142 g    One application 4 x daily to affected areas lower back and knees if necessary    Lesion of sciatic nerve, left       Menthol 10 % Aero     1 each    Externally apply 1 Dose topically 2 times daily as needed    Chronic low back pain, unspecified back pain laterality, with sciatica presence unspecified       MULTIvitamin  S Caps     90 capsule    Take 1 tablet by mouth daily    Iron deficiency anemia, unspecified       omeprazole 20 MG CR capsule    priLOSEC    180 capsule    TAKE 1 CAPSULE (20 MG) BY MOUTH 2 TIMES DAILY    Gastroesophageal reflux disease without esophagitis       order for DME     1 Device    Equipment being ordered: 4 wheel walker with brakes and cushioned seat    Spinal stenosis in cervical region, S/P laminectomy with spinal fusion, Weakness       oxybutynin 5 MG tablet    DITROPAN    90 tablet    Take 1 tablet (5 mg) by mouth 3 times daily    Overactive bladder       oxyCODONE IR 5 MG tablet    ROXICODONE    80 tablet    Take 1 tablet (5 mg) by mouth every 12 hours as needed for moderate to severe pain    Chronic pain syndrome, Spinal stenosis in cervical region       polyethylene glycol Packet    MIRALAX/GLYCOLAX    30 packet    Take 34 g by mouth daily    Thoracic spondylosis with myelopathy       pravastatin 40 MG tablet    PRAVACHOL    90 tablet    TAKE 1 TABLET BY MOUTH EVERY DAY    Pure hypercholesterolemia       * PROAIR  (90 Base) MCG/ACT inhaler   Generic drug:  albuterol     8.5 Inhaler    INHALE 2 PUFFS INTO THE LUNGS EVERY 6 HOURS AS NEEDED FOR SHORTNESS OF BREATH /  DYSPNEA OR WHEEZING    Dyspnea, unspecified type       * PROAIR  (90 Base) MCG/ACT inhaler   Generic drug:  albuterol     8.5 Inhaler    INHALE 2 PUFFS INTO THE LUNGS EVERY 6 HOURS AS NEEDED FOR SHORTNESS OF BREATH / DYSPNEA OR WHEEZING    Dyspnea, unspecified type       senna-docusate 8.6-50 MG per tablet    SENOKOT-S;PERICOLACE    60 tablet    Take 1 tablet by mouth 2 times daily    Chronic pain syndrome       tamsulosin 0.4 MG capsule    FLOMAX    180 capsule    TAKE 1 CAPSULE (0.4 MG) BY MOUTH TWO TIMES DAILY    Benign non-nodular prostatic hyperplasia with lower urinary tract symptoms       * Notice:  This list has 2 medication(s) that are the same as other medications prescribed for you. Read the directions carefully, and ask your doctor or other care provider to review them with you.

## 2018-08-24 NOTE — PATIENT INSTRUCTIONS
You do have arthritis in the affected joint but no fracture seen    Continue same medications    Increase walking as able    Use antibiotic ointment and bandage as needed    Talk with

## 2018-08-24 NOTE — PROGRESS NOTES
SUBJECTIVE:   Bhaskar Ott is a 77 year old male who presents to clinic today for the following health issues:       Patient states he had a fall a few weeks back and injured his left hand. He states he was seen at a urgent care and told there was no break but I am unable to find records of that visit    none    Problem list and histories reviewed & adjusted, as indicated.  Additional history: as documented         Reviewed and updated as needed this visit by clinical staff       Reviewed and updated as needed this visit by Provider          shoulder and back bothering    Also a lot of stress    Somebody got into account, so financial stress    Dealing with [facility] in [place]    A couple weeks since fell    Not as bad as it was    Leg is bad he states    Pain clinic advised him to see psychiatry.    Full physical not done     Mentation and affect are fine    No tremor of speech or extremity    Patient showed me his left knee    Has a 1x 0.5 cm slightly open area on patella.  Very superficial.  No drainage.    Got new walker, working well     Patient is sore over left hand.  Specifically sore over 1st cmc joint.  Not tender at all over mcp joint.    Xray done.  Arthritis in affected area but no fracture.      ASSESSMENT / PLAN:  (M79.642) Pain of left hand  (primary encounter diagnosis)  Comment: discussed in detail the arthritis.    Plan: XR Hand Left G/E 3 Views        Follow up prn symptoms     (S81.802A) Wound of left lower extremity, initial encounter  Comment: use antibiotic ointment and bandage ; should heal up soon   Plan: if not, be seen.  If worsens be seen promptly.     (I10) Hypertension goal BP (blood pressure) < 140/90  Comment: at goal   Plan: no change     (F43.9) Stress  Comment: patient with lots of stress  Plan: monitor, follow up prn.  Of note I did advise him to talk with his .  He thinks he needs help with housework etc.        I reviewed the patient's medications,  allergies, medical history, family history, and social history.    Daydya Alberts MD

## 2018-08-25 ASSESSMENT — ANXIETY QUESTIONNAIRES: GAD7 TOTAL SCORE: 7

## 2018-08-25 ASSESSMENT — PATIENT HEALTH QUESTIONNAIRE - PHQ9: SUM OF ALL RESPONSES TO PHQ QUESTIONS 1-9: 14

## 2018-09-08 DIAGNOSIS — N40.1 BENIGN NON-NODULAR PROSTATIC HYPERPLASIA WITH LOWER URINARY TRACT SYMPTOMS: ICD-10-CM

## 2018-09-10 NOTE — TELEPHONE ENCOUNTER
"Requested Prescriptions   Pending Prescriptions Disp Refills     finasteride (PROSCAR) 5 MG tablet [Pharmacy Med Name: FINASTERIDE 5 MG TABLET] 90 tablet 3    Last Written Prescription Date:  6/23/17  Last Fill Quantity: 90,  # refills: 3   Last office visit: 8/24/2018 with prescribing provider:     Future Office Visit:   Next 5 appointments (look out 90 days)     Oct 22, 2018  1:00 PM CDT   [facility] with Dayday [facility]ts [facility]   [facility]    [address] [place] [id number] [phone]                  Sig: TAKE 1 TABLET (5 MG) BY MOUTH DAILY    Alpha Blockers Passed    9/8/2018  2:00 AM       Passed - Blood pressure under 140/90 in past 12 months    BP Readings from Last 3 Encounters:   08/24/18 102/53   05/23/18 118/69   05/04/18 102/60                Passed - Recent (12 mo) or future (30 days) visit within the authorizing provider's specialty    Patient had office visit in the last 12 months or has a visit in the next 30 days with authorizing provider or within the authorizing provider's specialty.  See \"Patient Info\" tab in inbasket, or \"Choose Columns\" in Meds & Orders section of the refill encounter.           Passed - Patient does not have Tadalafil, Vardenafil, or Sildenafil on their medication list       Passed - Patient is 18 years of age or older          "

## 2018-09-11 ENCOUNTER — PATIENT OUTREACH (OUTPATIENT)
Dept: GERIATRIC MEDICINE | Facility: CLINIC | Age: 78
End: 2018-09-11

## 2018-09-11 RX ORDER — FINASTERIDE 5 MG/1
TABLET, FILM COATED ORAL
Qty: 90 TABLET | Refills: 3 | Status: SHIPPED | OUTPATIENT
Start: 2018-09-11 | End: 2019-09-16

## 2018-09-11 NOTE — PROGRESS NOTES
"[facility] Care Coordination Contact  CC received message from client stating he wanted to talk about PCA services and if he would qualify.  CC reviewed clients chart and noted client is an \"L\" case mix at last assessment.  Reviewed recent PCP notes.  Client due for next annual assessment before 11/5/18.  CC called client back and left message asking him to call me to discuss and schedule assessment visit.     CARMENCITA Robertson  [facility] Care Coordinator  [phone]    "

## 2018-09-11 NOTE — TELEPHONE ENCOUNTER
Prescription approved per [facility] Refill Protocol.    Idania Ansari RN  [facility]

## 2018-09-20 ENCOUNTER — PATIENT OUTREACH (OUTPATIENT)
Dept: GERIATRIC MEDICINE | Facility: CLINIC | Age: 78
End: 2018-09-20

## 2018-09-20 ENCOUNTER — TELEPHONE (OUTPATIENT)
Dept: FAMILY MEDICINE | Facility: CLINIC | Age: 78
End: 2018-09-20

## 2018-09-20 NOTE — TELEPHONE ENCOUNTER
Forms received from: [facility]   Phone number listed: [phone]   Fax listed: [phone]  Date received: 09/20/18  Form description: Request to Close Potential Gap in Therapy with a daily asthma controller therapy.  Once forms are completed, please return to [facility] via Fax.  Is patient requesting to be contacted when forms are completed: NA    Form placed: in providers akua Carcamo

## 2018-09-20 NOTE — PROGRESS NOTES
[facility] Care Coordination Contact  Got TENS unit from pain clinic. CC made referral to [facility] RN to go out and assess, teach client how to put the TENS unit on. He says there are several areas of his body he is supposed to use it, including his back.      Client says his pain in legs is getting worse and he is struggling with doing laundry and cleaning.  We discussed homemaking is more appropriate than PCA.  He says there is a woman in his building who is a homemaker for a neighbor and he wants to use her. He will ask her what agency she is with and will let me know.  We scheduled clients annual [facility] home visit for 10/22/18.     CARMENCITA Robertson  [facility] Care Coordinator  [phone]

## 2018-09-21 ENCOUNTER — TELEPHONE (OUTPATIENT)
Dept: FAMILY MEDICINE | Facility: CLINIC | Age: 78
End: 2018-09-21

## 2018-09-21 NOTE — TELEPHONE ENCOUNTER
Alvarez Carrera at [phone]   Relayed below order augusta Marroquin RN  [facility]

## 2018-09-21 NOTE — TELEPHONE ENCOUNTER
Reason for Call: Request for an order or referral:    Order or referral being requested: Debi with [facility] calling stating that the  with [facility] made a referral for a nurse and PT to visit with patient to help teach patient how to use his TENS unit. Debi is needing an order for SN and PT start of care. Debi is requesting a call back today. TC will route as high priority.    Date needed: as soon as possible    Has the patient been seen by the PCP for this problem? Not Applicable    Additional comments:     Phone number Patient can be reached at:  Other phone number:  [phone]    Best Time:  any    Can we leave a detailed message on this number?  YES    Call taken on 9/21/2018 at 10:52 AM by Charlee Vargas

## 2018-09-23 ENCOUNTER — DOCUMENTATION ONLY (OUTPATIENT)
Dept: CARE COORDINATION | Facility: CLINIC | Age: 78
End: 2018-09-23

## 2018-09-23 NOTE — PROGRESS NOTES
Dear Dr. Alberts,   Medicare Home Health regulations requires [facility] to provide an initial assessment visit either within 48 hours of the patient's return home, or on the physician ordered Start of Care date.    Thank You for the referral for [facility]  Services for Bhaskar Ott; MRN [id number]  [facility] contacted patient for home care service,  pt declined  Home care visit stating  // I have got emergency and would like to wait until next month //. [facility] will need updated order when pt is ready for Home care service.  Former[facility] closing the episode for now    Sincerely [facility]  Arturo Styles  [phone]

## 2018-09-24 ENCOUNTER — OFFICE VISIT (OUTPATIENT)
Dept: FAMILY MEDICINE | Facility: CLINIC | Age: 78
End: 2018-09-24
Payer: COMMERCIAL

## 2018-09-24 VITALS
BODY MASS INDEX: 25.04 KG/M2 | OXYGEN SATURATION: 97 % | WEIGHT: 138 LBS | TEMPERATURE: 98.8 F | SYSTOLIC BLOOD PRESSURE: 122 MMHG | HEART RATE: 77 BPM | DIASTOLIC BLOOD PRESSURE: 75 MMHG

## 2018-09-24 DIAGNOSIS — R73.01 IMPAIRED FASTING GLUCOSE: ICD-10-CM

## 2018-09-24 DIAGNOSIS — R35.0 URINARY FREQUENCY: Primary | ICD-10-CM

## 2018-09-24 DIAGNOSIS — M79.606 PAIN OF LOWER EXTREMITY, UNSPECIFIED LATERALITY: ICD-10-CM

## 2018-09-24 DIAGNOSIS — Z86.0100 HISTORY OF COLONIC POLYPS: ICD-10-CM

## 2018-09-24 DIAGNOSIS — G89.4 CHRONIC PAIN SYNDROME: ICD-10-CM

## 2018-09-24 DIAGNOSIS — Z12.5 SCREENING FOR PROSTATE CANCER: ICD-10-CM

## 2018-09-24 DIAGNOSIS — J34.89 DRY NOSE: ICD-10-CM

## 2018-09-24 DIAGNOSIS — R53.83 FATIGUE, UNSPECIFIED TYPE: ICD-10-CM

## 2018-09-24 LAB
ALBUMIN SERPL-MCNC: 3.9 G/DL (ref 3.4–5)
ALBUMIN UR-MCNC: NEGATIVE MG/DL
ALP SERPL-CCNC: 96 U/L (ref 40–150)
ALT SERPL W P-5'-P-CCNC: 48 U/L (ref 0–70)
ANION GAP SERPL CALCULATED.3IONS-SCNC: 10 MMOL/L (ref 3–14)
APPEARANCE UR: CLEAR
AST SERPL W P-5'-P-CCNC: 43 U/L (ref 0–45)
BASOPHILS # BLD AUTO: 0 10E9/L (ref 0–0.2)
BASOPHILS NFR BLD AUTO: 0.6 %
BILIRUB SERPL-MCNC: 0.8 MG/DL (ref 0.2–1.3)
BILIRUB UR QL STRIP: NEGATIVE
BUN SERPL-MCNC: 29 MG/DL (ref 7–30)
CALCIUM SERPL-MCNC: 9 MG/DL (ref 8.5–10.1)
CHLORIDE SERPL-SCNC: 105 MMOL/L (ref 94–109)
CO2 SERPL-SCNC: 26 MMOL/L (ref 20–32)
COLOR UR AUTO: YELLOW
CREAT SERPL-MCNC: 1.62 MG/DL (ref 0.66–1.25)
DIFFERENTIAL METHOD BLD: ABNORMAL
EOSINOPHIL # BLD AUTO: 0.1 10E9/L (ref 0–0.7)
EOSINOPHIL NFR BLD AUTO: 0.7 %
ERYTHROCYTE [DISTWIDTH] IN BLOOD BY AUTOMATED COUNT: 12.8 % (ref 10–15)
GFR SERPL CREATININE-BSD FRML MDRD: 41 ML/MIN/1.7M2
GLUCOSE SERPL-MCNC: 103 MG/DL (ref 70–99)
GLUCOSE UR STRIP-MCNC: NEGATIVE MG/DL
HBA1C MFR BLD: 5.3 % (ref 0–5.6)
HCT VFR BLD AUTO: 37.2 % (ref 40–53)
HGB BLD-MCNC: 12.9 G/DL (ref 13.3–17.7)
HGB UR QL STRIP: NEGATIVE
KETONES UR STRIP-MCNC: NEGATIVE MG/DL
LEUKOCYTE ESTERASE UR QL STRIP: NEGATIVE
LYMPHOCYTES # BLD AUTO: 1 10E9/L (ref 0.8–5.3)
LYMPHOCYTES NFR BLD AUTO: 13.3 %
MCH RBC QN AUTO: 32.1 PG (ref 26.5–33)
MCHC RBC AUTO-ENTMCNC: 34.7 G/DL (ref 31.5–36.5)
MCV RBC AUTO: 93 FL (ref 78–100)
MONOCYTES # BLD AUTO: 0.3 10E9/L (ref 0–1.3)
MONOCYTES NFR BLD AUTO: 4.6 %
NEUTROPHILS # BLD AUTO: 5.8 10E9/L (ref 1.6–8.3)
NEUTROPHILS NFR BLD AUTO: 80.8 %
NITRATE UR QL: NEGATIVE
PH UR STRIP: 5.5 PH (ref 5–7)
PLATELET # BLD AUTO: 235 10E9/L (ref 150–450)
POTASSIUM SERPL-SCNC: 4.2 MMOL/L (ref 3.4–5.3)
PROT SERPL-MCNC: 7.5 G/DL (ref 6.8–8.8)
PSA SERPL-ACNC: 0.45 UG/L (ref 0–4)
RBC # BLD AUTO: 4.02 10E12/L (ref 4.4–5.9)
SODIUM SERPL-SCNC: 141 MMOL/L (ref 133–144)
SOURCE: NORMAL
SP GR UR STRIP: >1.03 (ref 1–1.03)
TSH SERPL DL<=0.005 MIU/L-ACNC: 0.68 MU/L (ref 0.4–4)
UROBILINOGEN UR STRIP-ACNC: 0.2 EU/DL (ref 0.2–1)
WBC # BLD AUTO: 7.2 10E9/L (ref 4–11)

## 2018-09-24 PROCEDURE — 80053 COMPREHEN METABOLIC PANEL: CPT | Performed by: FAMILY MEDICINE

## 2018-09-24 PROCEDURE — 99214 OFFICE O/P EST MOD 30 MIN: CPT | Performed by: FAMILY MEDICINE

## 2018-09-24 PROCEDURE — 81003 URINALYSIS AUTO W/O SCOPE: CPT | Performed by: FAMILY MEDICINE

## 2018-09-24 PROCEDURE — 85025 COMPLETE CBC W/AUTO DIFF WBC: CPT | Performed by: FAMILY MEDICINE

## 2018-09-24 PROCEDURE — G0103 PSA SCREENING: HCPCS | Performed by: FAMILY MEDICINE

## 2018-09-24 PROCEDURE — 99000 SPECIMEN HANDLING OFFICE-LAB: CPT | Performed by: FAMILY MEDICINE

## 2018-09-24 PROCEDURE — 84443 ASSAY THYROID STIM HORMONE: CPT | Performed by: FAMILY MEDICINE

## 2018-09-24 PROCEDURE — 83036 HEMOGLOBIN GLYCOSYLATED A1C: CPT | Performed by: FAMILY MEDICINE

## 2018-09-24 PROCEDURE — 36415 COLL VENOUS BLD VENIPUNCTURE: CPT | Performed by: FAMILY MEDICINE

## 2018-09-24 PROCEDURE — 80307 DRUG TEST PRSMV CHEM ANLYZR: CPT | Mod: 90 | Performed by: FAMILY MEDICINE

## 2018-09-24 ASSESSMENT — PAIN SCALES - GENERAL: PAINLEVEL: NO PAIN (0)

## 2018-09-24 NOTE — PATIENT INSTRUCTIONS
Use plain saline nasal spray, one spray each nostril several times day    We will send you lab results    Monitor the leg / arm symptoms    Increase walking as able    Call to schedule colonoscopy

## 2018-09-24 NOTE — PROGRESS NOTES
SUBJECTIVE:   Bhaskar Ott is a 77 year old male who presents to clinic today for the following health issues:       Leg issues  Bump in right arm area  Ringing in ears    none    Problem list and histories reviewed & adjusted, as indicated.  Additional history: as documented         Reviewed and updated as needed this visit by clinical staff       Reviewed and updated as needed this visit by Provider          has to struggle to have bm    Stool softeners not helping much    Patient thinks he has type 2 diabetes    Losing eyesight    Has not been to eye doctor recently    Trying to get organized in apartment    Started smoking again    Stress    Got the nicotine patch     Was at 1/2 to 1 ppd    On right lower leg the lump sometimes appears for up to a few hours, then goes back in  Up to golf ball size  Hard to touch    Painful    Stream variable    Up at night to urinate up to 4-5 x at night    Dry nostrils    Some jaw pain with eating    Physical Exam   Constitutional: He is oriented to person, place, and time and well-developed, well-nourished, and in no distress. No distress.   HENT:   Head: Normocephalic and atraumatic.   Right Ear: External ear normal.   Left Ear: External ear normal.   Nose: Nose normal.   Mouth/Throat: Oropharynx is clear and moist.   Nasal and oral mucosa normal     Eyes: Conjunctivae are normal.   Neck: Carotid bruit is not present.   Cardiovascular: Normal rate, regular rhythm, normal heart sounds and intact distal pulses.  Exam reveals no gallop and no friction rub.    No murmur heard.  Pulmonary/Chest: Effort normal and breath sounds normal. No respiratory distress. He has no wheezes. He has no rales.   Musculoskeletal: He exhibits no edema.   Neurological: He is alert and oriented to person, place, and time.   Skin: He is not diaphoretic.   Psychiatric: Mood and affect normal.       No visible or palpable swelling/ abnormality of the affected lower leg    Good sensation and  strength in both lower extremities    No pain when raising up on his toes    On right arm I cannot detect any lump or tenderness.  Patient could not locate it either today    ASSESSMENT / PLAN:  (R35.0) Urinary frequency  (primary encounter diagnosis)  Comment: check ua  Plan: *UA reflex to Microscopic and Culture ([place]         and [facility] (except [place] and         [place])             (G89.4) Chronic pain syndrome  Comment: drug screen. Of note, patient is also seen at pain clinic, gets narcotics from there.   Plan: Drug  Screen Comprehensive, Urine w/o Reported         Meds (Pain Care Package)             (Z12.5) Screening for prostate cancer  Comment: psa   Plan: Prostate spec antigen screen             (R53.83) Fatigue, unspecified type  Comment: check   Plan: Comprehensive metabolic panel, CBC with         platelets differential, TSH with free T4 reflex             (R73.01) Impaired fasting glucose  Comment: check   Plan: Hemoglobin A1c             (Z86.010) History of colonic polyps  Comment: encouraged patient to scheudule colonoscopy  Plan: GASTROENTEROLOGY ADULT REF PROCEDURE ONLY [facility] [phone]; [facility]         [facility]             (M79.686) Pain of lower extremity, unspecified laterality  Comment: okay to monitor   Plan: follow up prn.  Encouraged more walking/ exercise as able.     Dry nose: use over the counter nasal saline    I reviewed the patient's medications, allergies, medical history, family history, and social history.    Dayday Alberts MD

## 2018-09-24 NOTE — MR AVS SNAPSHOT
After Visit Summary   9/24/2018    Bhaskar Ott    MRN: [id number]           Patient Information     Date Of Birth          1940        Visit Information        Provider Department      9/24/2018 2:20 PM Dayday Alberts MD [facility]        Today's Diagnoses     Urinary frequency    -  1    Chronic pain syndrome        Screening for prostate cancer        Fatigue, unspecified type        Impaired fasting glucose        History of colonic polyps          Care Instructions    Use plain saline nasal spray, one spray each nostril several times day    We will send you lab results    Monitor the leg / arm symptoms    Increase walking as able    Call to schedule colonoscopy              Follow-ups after your visit        Additional Services     GASTROENTEROLOGY ADULT REF PROCEDURE ONLY [facility] [phone]; [facility]       Last Lab Result: Creatinine (mg/dL)       Date                     Value                 06/23/2017               1.14             ----------  Body mass index is 25.04 kg/(m^2).     Needed:  No  Language:  English    Patient will be contacted to schedule procedure.     Please be aware that coverage of these services is subject to the terms and limitations of your health insurance plan.  Call member services at your health plan with any benefit or coverage questions.  Any procedures must be performed at a [facility] facility OR coordinated by your clinic's referral office.    Please bring the following with you to your appointment:    (1) Any X-Rays, CTs or MRIs which have been performed.  Contact the facility where they were done to arrange for  prior to your scheduled appointment.    (2) List of current medications   (3) This referral request   (4) Any documents/labs given to you for this referral                  Your next 10 appointments already scheduled     Oct 22, 2018  1:00 PM CDT   Office Visit with Dayday  KIKI Alberts MD   [facility]    [address] [facility] [address] [phone]           Bring a current list of meds and any records pertaining to this visit. For Physicals, please bring immunization records and any forms needing to be filled out. Please arrive 10 minutes early to complete paperwork.              Who to contact     If you have questions or need follow up information about today's clinic visit or your schedule please contact [facility] directly at [phone].  Normal or non-critical lab and imaging results will be communicated to you by MyChart, letter or phone within 4 business days after the clinic has received the results. If you do not hear from us within 7 days, please contact the clinic through MyChart or phone. If you have a critical or abnormal lab result, we will notify you by phone as soon as possible.  Submit refill requests through [employer] or call your pharmacy and they will forward the refill request to us. Please allow 3 business days for your refill to be completed.          Additional Information About Your Visit        Care EveryWhere ID     This is your Care EveryWhere ID. This could be used by other organizations to access your [facility] medical records  [id number]        Your Vitals Were     Pulse Temperature Pulse Oximetry BMI (Body Mass Index)          77 98.8  F (37.1  C) (Oral) 97% 25.04 kg/m2         Blood Pressure from Last 3 Encounters:   09/24/18 122/75   08/24/18 102/53   05/23/18 118/69    Weight from Last 3 Encounters:   09/24/18 138 lb (62.6 kg)   08/24/18 138 lb (62.6 kg)   05/23/18 134 lb (60.8 kg)              We Performed the Following     *UA reflex to Microscopic and Culture ([place] (except [place] and [place])     CBC with platelets differential     Comprehensive metabolic panel     Drug  Screen Comprehensive, Urine w/o Reported  Meds (Pain Care Package)     GASTROENTEROLOGY ADULT REF PROCEDURE ONLY [facility] [phone]; [facility]     Hemoglobin A1c     Prostate spec antigen screen     TSH with free T4 reflex        Primary Care Provider Office Phone # Fax #    Dayday Alberts [phone]       [address] [facility] [address]        Equal Access to Services     [facility]: Hadii aad ku hadasho Soomaali, waaxda luqadaha, qaybta kaalmada adeegyada, waxay idiin hayaan adeeg kharash la'aan . So [facility] [phone].    ATENCIÓN: Si habla español, tiene a bhatti disposición servicios gratuitos de asistencia lingüística. Josuéame al [phone].    We comply with applicable federal civil rights laws and [place] laws. We do not discriminate on the basis of race, color, national origin, age, disability, sex, sexual orientation, or gender identity.            Thank you!     Thank you for choosing [facility]  for your care. Our goal is always to provide you with excellent care. Hearing back from our patients is one way we can continue to improve our services. Please take a few minutes to complete the written survey that you may receive in the mail after your visit with us. Thank you!             Your Updated Medication List - Protect others around you: Learn how to safely use, store and throw away your medicines at www.disposemymeds.org.          This list is accurate as of 9/24/18  2:38 PM.  Always use your most recent med list.                   Brand Name Dispense Instructions for use Diagnosis    amLODIPine 5 MG tablet    NORVASC    90 tablet    TAKE 1 TABLET (5 MG) BY MOUTH DAILY    Benign essential hypertension       B-12 1000 MCG Tbcr     100 tablet    Take 1,000 mcg by mouth daily    Cognitive impairment       BOOST BREEZE Liqd     90 each    Take 1 Can by mouth 3 times daily (with meals)    Cachexia (H)       calcium carbonate 500 mg-vitamin D 200 units 500-200  MG-UNIT per tablet    OSCAL with D;OYSTER SHELL CALCIUM    90 tablet    Take 1 tablet by mouth daily    Chronic lower back pain       cholecalciferol 1000 UNIT tablet    vitamin D3    90 tablet    Take 1 tablet (1,000 Units) by mouth daily    Vitamin D deficiency       ferrous gluconate 325 (36 Fe) MG Tabs     180 tablet    Take 1 tablet by mouth 2 times daily    Iron deficiency anemia, unspecified       finasteride 5 MG tablet    PROSCAR    90 tablet    TAKE 1 TABLET (5 MG) BY MOUTH DAILY    Benign non-nodular prostatic hyperplasia with lower urinary tract symptoms       gabapentin 100 MG capsule    NEURONTIN    60 capsule    TAKE 1-2 BY MOUTH AT BEDTIME AS NEEDED FOR SLEEP    Insomnia, unspecified type       lidocaine 5 % ointment    XYLOCAINE    142 g    One application 4 x daily to affected areas lower back and knees if necessary    Lesion of sciatic nerve, left       Menthol 10 % Aero     1 each    Externally apply 1 Dose topically 2 times daily as needed    Chronic low back pain, unspecified back pain laterality, with sciatica presence unspecified       MULTIvitamin  S Caps     90 capsule    Take 1 tablet by mouth daily    Iron deficiency anemia, unspecified       omeprazole 20 MG CR capsule    priLOSEC    180 capsule    TAKE 1 CAPSULE (20 MG) BY MOUTH 2 TIMES DAILY    Gastroesophageal reflux disease without esophagitis       order for DME     1 Device    Equipment being ordered: 4 wheel walker with brakes and cushioned seat    Spinal stenosis in cervical region, S/P laminectomy with spinal fusion, Weakness       oxybutynin 5 MG tablet    DITROPAN    90 tablet    Take 1 tablet (5 mg) by mouth 3 times daily    Overactive bladder       oxyCODONE IR 5 MG tablet    ROXICODONE    80 tablet    Take 1 tablet (5 mg) by mouth every 12 hours as needed for moderate to severe pain    Chronic pain syndrome, Spinal stenosis in cervical region       polyethylene glycol Packet    MIRALAX/GLYCOLAX    30 packet    Take 34 g by  mouth daily    Thoracic spondylosis with myelopathy       pravastatin 40 MG tablet    PRAVACHOL    90 tablet    TAKE 1 TABLET BY MOUTH EVERY DAY    Pure hypercholesterolemia       * PROAIR  (90 Base) MCG/ACT inhaler   Generic drug:  albuterol     8.5 Inhaler    INHALE 2 PUFFS INTO THE LUNGS EVERY 6 HOURS AS NEEDED FOR SHORTNESS OF BREATH / DYSPNEA OR WHEEZING    Dyspnea, unspecified type       * PROAIR  (90 Base) MCG/ACT inhaler   Generic drug:  albuterol     8.5 Inhaler    INHALE 2 PUFFS INTO THE LUNGS EVERY 6 HOURS AS NEEDED FOR SHORTNESS OF BREATH / DYSPNEA OR WHEEZING    Dyspnea, unspecified type       senna-docusate 8.6-50 MG per tablet    SENOKOT-S;PERICOLACE    60 tablet    Take 1 tablet by mouth 2 times daily    Chronic pain syndrome       tamsulosin 0.4 MG capsule    FLOMAX    180 capsule    TAKE 1 CAPSULE (0.4 MG) BY MOUTH TWO TIMES DAILY    Benign non-nodular prostatic hyperplasia with lower urinary tract symptoms       * Notice:  This list has 2 medication(s) that are the same as other medications prescribed for you. Read the directions carefully, and ask your doctor or other care provider to review them with you.

## 2018-09-24 NOTE — LETTER
[facility]   [address] [place]  [address]  [phone]                                   September 26, 2018    Bhaskar Ott  [address]    [facility] [address]        Dear Bhaskar,    The kidney test ( creatinine ) is elevated.  This is a change from the past.    See us in 2 months in clinic so we can recheck this.  Come in sooner if needed.    Stay well hydrated.    Other blood tests are stable.    Results for orders placed or performed in visit on 09/24/18   *UA reflex to Microscopic and Culture ([place] [facility] (except [place] and [place])   Result Value Ref Range    Color Urine Yellow     Appearance Urine Clear     Glucose Urine Negative NEG^Negative mg/dL    Bilirubin Urine Negative NEG^Negative    Ketones Urine Negative NEG^Negative mg/dL    Specific Gravity Urine >1.030 1.003 - 1.035    Blood Urine Negative NEG^Negative    pH Urine 5.5 5.0 - 7.0 pH    Protein Albumin Urine Negative NEG^Negative mg/dL    Urobilinogen Urine 0.2 0.2 - 1.0 EU/dL    Nitrite Urine Negative NEG^Negative    Leukocyte Esterase Urine Negative NEG^Negative    Source Midstream Urine    Hemoglobin A1c   Result Value Ref Range    Hemoglobin A1C 5.3 0 - 5.6 %   Comprehensive metabolic panel   Result Value Ref Range    Sodium 141 133 - 144 mmol/L    Potassium 4.2 3.4 - 5.3 mmol/L    Chloride 105 94 - 109 mmol/L    Carbon Dioxide 26 20 - 32 mmol/L    Anion Gap 10 3 - 14 mmol/L    Glucose 103 (H) 70 - 99 mg/dL    Urea Nitrogen 29 7 - 30 mg/dL    Creatinine 1.62 (H) 0.66 - 1.25 mg/dL    GFR Estimate 41 (L) >60 mL/min/1.7m2    GFR Estimate If Black 50 (L) >60 mL/min/1.7m2    Calcium 9.0 8.5 - 10.1 mg/dL    Bilirubin Total 0.8 0.2 - 1.3 mg/dL    Albumin 3.9 3.4 - 5.0 g/dL    Protein Total 7.5 6.8 - 8.8 g/dL    Alkaline Phosphatase 96 40 - 150 U/L    ALT 48 0 - 70 U/L    AST 43 0 - 45 U/L   CBC with platelets differential   Result Value Ref Range    WBC 7.2 4.0 - 11.0  10e9/L    RBC Count 4.02 (L) 4.4 - 5.9 10e12/L    Hemoglobin 12.9 (L) 13.3 - 17.7 g/dL    Hematocrit 37.2 (L) 40.0 - 53.0 %    MCV 93 78 - 100 fl    MCH 32.1 26.5 - 33.0 pg    MCHC 34.7 31.5 - 36.5 g/dL    RDW 12.8 10.0 - 15.0 %    Platelet Count 235 150 - 450 10e9/L    % Neutrophils 80.8 %    % Lymphocytes 13.3 %    % Monocytes 4.6 %    % Eosinophils 0.7 %    % Basophils 0.6 %    Absolute Neutrophil 5.8 1.6 - 8.3 10e9/L    Absolute Lymphocytes 1.0 0.8 - 5.3 10e9/L    Absolute Monocytes 0.3 0.0 - 1.3 10e9/L    Absolute Eosinophils 0.1 0.0 - 0.7 10e9/L    Absolute Basophils 0.0 0.0 - 0.2 10e9/L    Diff Method Automated Method    TSH with free T4 reflex   Result Value Ref Range    TSH 0.68 0.40 - 4.00 mU/L   Prostate spec antigen screen   Result Value Ref Range    PSA 0.45 0 - 4 ug/L       If you have any questions please call the clinic at [phone]    Sincerely,    Dayday Alberts MD  hnr

## 2018-09-25 NOTE — PROGRESS NOTES
The kidney test ( creatinine ) is elevated.  This is a change from the past.    See us in 2 months in clinic so we can recheck this.  Come in sooner if needed.    Stay well hydrated.    Other blood tests are stable.    Dayday Alberts MD

## 2018-09-27 LAB — COMPREHEN DRUG ANALYSIS UR: NORMAL

## 2018-10-10 ENCOUNTER — TELEPHONE (OUTPATIENT)
Dept: FAMILY MEDICINE | Facility: CLINIC | Age: 78
End: 2018-10-10

## 2018-10-10 NOTE — TELEPHONE ENCOUNTER
To PCP:  Patient reporting a lump/bump on his right shin.  States he saw you for this at last appt on 9/26.  Lump comes and goes. Its there with movement but then dissipates.  Pain that feels like a jb horse then bump appears.     About the size of a golf ball.  Since bump comes and goes, he will continue to monitor.  I also suggested taking a picture if possible.  Please advise further recommendations.  Thank you.  Ratna Naik RN

## 2018-10-10 NOTE — TELEPHONE ENCOUNTER
Reason for call:  Patient reporting a symptom    Symptom or request: lump on right leg/ Dr Alberts said to call clinic right away when he sees a lump on his leg.    Duration (how long have symptoms been present):  Before his last visit w/ Dr Alberts    Have you been treated for this before? No    Additional comments:     Phone Number patient can be reached at:  Cell number on file:    Telephone Information:   Mobile [phone]       Best Time:  any    Can we leave a detailed message on this number:  YES    Call taken on 10/10/2018 at 9:09 AM by Patricia Keller

## 2018-10-15 DIAGNOSIS — R06.00 DYSPNEA, UNSPECIFIED TYPE: ICD-10-CM

## 2018-10-15 RX ORDER — ALBUTEROL SULFATE 90 UG/1
AEROSOL, METERED RESPIRATORY (INHALATION)
Qty: 8.5 INHALER | Refills: 3 | Status: SHIPPED | OUTPATIENT
Start: 2018-10-15 | End: 2020-06-22

## 2018-10-15 NOTE — TELEPHONE ENCOUNTER
"Requested Prescriptions   Pending Prescriptions Disp Refills     albuterol (PROAIR HFA) 108 (90 Base) MCG/ACT inhaler [Pharmacy Med Name: PROAIR HFA 90 MCG INHALER] 8.5 Inhaler 3    Last Written Prescription Date:  4-20-18  Last Fill Quantity: 8.5,  # refills: 3   Last office visit: 9/24/2018 with prescribing provider:  9-24-18   Future Office Visit:   Next 5 appointments (look out 90 days)     Oct 22, 2018  1:00 PM CDT   [facility]    [address] [place] [address]   [phone]                  Sig: INHALE 2 PUFFS INTO THE LUNGS EVERY 6 HOURS AS NEEDED FOR SHORTNESS OF BREATH / DYSPNEA OR WHEEZING    Asthma Maintenance Inhalers - Anticholinergics Passed    10/15/2018 11:06 AM       Passed - Patient is age 12 years or older       Passed - Recent (12 mo) or future (30 days) visit within the authorizing provider's specialty    Patient had office visit in the last 12 months or has a visit in the next 30 days with authorizing provider or within the authorizing provider's specialty.  See \"Patient Info\" tab in inbasket, or \"Choose Columns\" in Meds & Orders section of the refill encounter.              "

## 2018-10-16 NOTE — TELEPHONE ENCOUNTER
Prescription approved per [facility] Refill Protocol.  Ratna Naik RN

## 2018-10-17 NOTE — TELEPHONE ENCOUNTER
Patient called back. RN notified him of message below from provider. Patient wanted an appointment to see provider to recheck the bump. Appointment scheduled for when provider gets back.    Amparo Craig RN  [facility]

## 2018-10-17 NOTE — TELEPHONE ENCOUNTER
Called patient at [phone] (home) to notify him that provider agreed with previous RN recommendation of monitoring and trying to get a picture of the bump. Unable to reach, left RN triage line to call back.    Amparo Craig RN  [facility]

## 2018-10-17 NOTE — TELEPHONE ENCOUNTER
Patient states that the lump is still there, and is wondering what Dr. Alberts would like him to do about it. He states he has not been able to get a picture of it yet.    Please call to discuss.    Thanks!  Mike Mcintosh

## 2018-10-22 ENCOUNTER — PATIENT OUTREACH (OUTPATIENT)
Dept: GERIATRIC MEDICINE | Facility: CLINIC | Age: 78
End: 2018-10-22

## 2018-10-22 ENCOUNTER — TRANSFERRED RECORDS (OUTPATIENT)
Dept: HEALTH INFORMATION MANAGEMENT | Facility: CLINIC | Age: 78
End: 2018-10-22

## 2018-10-22 DIAGNOSIS — Z76.89 HEALTH CARE HOME: ICD-10-CM

## 2018-10-22 ASSESSMENT — ACTIVITIES OF DAILY LIVING (ADL): DEPENDENT_IADLS:: CLEANING;LAUNDRY;TRANSPORTATION
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
## 2018-10-26 ENCOUNTER — TELEPHONE (OUTPATIENT)
Dept: FAMILY MEDICINE | Facility: CLINIC | Age: 78
End: 2018-10-26

## 2018-10-26 ENCOUNTER — RADIANT APPOINTMENT (OUTPATIENT)
Dept: GENERAL RADIOLOGY | Facility: CLINIC | Age: 78
End: 2018-10-26
Attending: FAMILY MEDICINE
Payer: COMMERCIAL

## 2018-10-26 ENCOUNTER — OFFICE VISIT (OUTPATIENT)
Dept: FAMILY MEDICINE | Facility: CLINIC | Age: 78
End: 2018-10-26
Payer: COMMERCIAL

## 2018-10-26 VITALS
TEMPERATURE: 98.8 F | BODY MASS INDEX: 25.04 KG/M2 | SYSTOLIC BLOOD PRESSURE: 110 MMHG | HEART RATE: 70 BPM | OXYGEN SATURATION: 98 % | DIASTOLIC BLOOD PRESSURE: 64 MMHG | WEIGHT: 138 LBS

## 2018-10-26 DIAGNOSIS — R79.89 ELEVATED SERUM CREATININE: ICD-10-CM

## 2018-10-26 DIAGNOSIS — Z12.11 SCREEN FOR COLON CANCER: ICD-10-CM

## 2018-10-26 DIAGNOSIS — I10 HYPERTENSION GOAL BP (BLOOD PRESSURE) < 140/90: ICD-10-CM

## 2018-10-26 DIAGNOSIS — M79.89 RIGHT LEG SWELLING: ICD-10-CM

## 2018-10-26 DIAGNOSIS — M79.661 PAIN OF RIGHT LOWER LEG: ICD-10-CM

## 2018-10-26 DIAGNOSIS — M79.661 PAIN OF RIGHT LOWER LEG: Primary | ICD-10-CM

## 2018-10-26 DIAGNOSIS — S80.851A FOREIGN BODY IN RIGHT LOWER EXTREMITY, INITIAL ENCOUNTER: ICD-10-CM

## 2018-10-26 LAB
BUN SERPL-MCNC: 33 MG/DL (ref 7–30)
CREAT SERPL-MCNC: 1.29 MG/DL (ref 0.66–1.25)
GFR SERPL CREATININE-BSD FRML MDRD: 54 ML/MIN/1.7M2

## 2018-10-26 PROCEDURE — 36415 COLL VENOUS BLD VENIPUNCTURE: CPT | Performed by: FAMILY MEDICINE

## 2018-10-26 PROCEDURE — 84520 ASSAY OF UREA NITROGEN: CPT | Performed by: FAMILY MEDICINE

## 2018-10-26 PROCEDURE — 99214 OFFICE O/P EST MOD 30 MIN: CPT | Performed by: FAMILY MEDICINE

## 2018-10-26 PROCEDURE — 82565 ASSAY OF CREATININE: CPT | Performed by: FAMILY MEDICINE

## 2018-10-26 PROCEDURE — 73590 X-RAY EXAM OF LOWER LEG: CPT | Mod: RT

## 2018-10-26 ASSESSMENT — PAIN SCALES - GENERAL: PAINLEVEL: NO PAIN (0)

## 2018-10-26 NOTE — TELEPHONE ENCOUNTER
Reason for Call:  Other / Verbal orders request    Detailed comments: Herman with [facility] called and requested verbal approval for skill nurse adelia to access for every other week skill set up to go to patient's home.  Please call Herman for okay.    Phone Number Patient can be reached at: [phone] (Herman with [facility])    Best Time: Anytime    Can we leave a detailed message on this number? YES    Call taken on 10/26/2018 at 8:48 AM by Cheryl Wade

## 2018-10-26 NOTE — LETTER
[facility]   [address] [place]  [address]  [phone]                                   October 29, 2018    Bhaskar Ott  [address]    [facility] [address]        Dear Bhaskar,    Good news.  Kidney test is better.     Results for orders placed or performed in visit on 10/26/18   Creatinine   Result Value Ref Range    Creatinine 1.29 (H) 0.66 - 1.25 mg/dL    GFR Estimate 54 (L) >60 mL/min/1.7m2    GFR Estimate If Black 65 >60 mL/min/1.7m2   Urea nitrogen   Result Value Ref Range    Urea Nitrogen 33 (H) 7 - 30 mg/dL       If you have any questions please call the clinic at [phone]    Sincerely,    Dayday Alberts MD  bmd

## 2018-10-26 NOTE — PATIENT INSTRUCTIONS
Call to schedule colonoscopy    Call to schedule consult with bone specialist    We will notify you of kidney test result

## 2018-10-26 NOTE — TELEPHONE ENCOUNTER
Routing to PCP to review and advise.    HC orders    Gretchen Marroquin RN  [facility]
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
## 2018-10-26 NOTE — MR AVS SNAPSHOT
After Visit Summary   10/26/2018    Bhaskar Ott    MRN: [id number]           Patient Information     Date Of Birth          1940        Visit Information        Provider Department      10/26/2018 2:40 PM Dayday Alberts MD [facility]        Today's Diagnoses     Pain of right lower leg    -  1    Elevated serum creatinine        Right leg swelling        Foreign body in right lower extremity, initial encounter        Screen for colon cancer          Care Instructions    Call to schedule colonoscopy    Call to schedule consult with bone specialist    We will notify you of kidney test result           Follow-ups after your visit        Additional Services     GASTROENTEROLOGY ADULT REF PROCEDURE ONLY [facility] [phone]; [facility]       Last Lab Result: Creatinine (mg/dL)       Date                     Value                 09/24/2018               1.62 (H)         ----------  Body mass index is 25.04 kg/(m^2).     Needed:  No  Language:  English    Patient will be contacted to schedule procedure.     Please be aware that coverage of these services is subject to the terms and limitations of your health insurance plan.  Call member services at your health plan with any benefit or coverage questions.  Any procedures must be performed at a [place] facility OR coordinated by your clinic's referral office.    Please bring the following with you to your appointment:    (1) Any X-Rays, CTs or MRIs which have been performed.  Contact the facility where they were done to arrange for  prior to your scheduled appointment.    (2) List of current medications   (3) This referral request   (4) Any documents/labs given to you for this referral            ORTHOPEDICS ADULT REFERRAL       Your provider has referred you to: [facility]: [facility] [phone]   [email or web address]  [facility]: [place]  [facility] [phone]    [email or web address]  [facility]: [facility] [phone]    [email or web address]  [facility]: [place] [phone]    [email or web address]    Please be aware that coverage of these services is subject to the terms and limitations of your health insurance plan.  Call member services at your health plan with any benefit or coverage questions.      Please bring the following to your appointment:    >>   Any x-rays, CTs or MRIs which have been performed.  Contact the facility where they were done to arrange for  prior to your scheduled appointment.    >>   List of current medications   >>   This referral request   >>   Any documents/labs given to you for this referral                  Who to contact     If you have questions or need follow up information about today's clinic visit or your schedule please contact [facility] directly at [phone].  Normal or non-critical lab and imaging results will be communicated to you by MyChart, letter or phone within 4 business days after the clinic has received the results. If you do not hear from us within 7 days, please contact the clinic through MyChart or phone. If you have a critical or abnormal lab result, we will notify you by phone as soon as possible.  Submit refill requests through [email or web address] or call your pharmacy and they will forward the refill request to us. Please allow 3 business days for your refill to be completed.          Additional Information About Your Visit        Care EveryWhere ID     This is your Care EveryWhere ID. This could be used by other organizations to access your [place] medical records  [id number]        Your Vitals Were     Pulse Temperature Pulse Oximetry BMI (Body Mass Index)          70 98.8  F (37.1  C) (Oral) 98% 25.04 kg/m2          Blood Pressure from Last 3 Encounters:   10/26/18 110/64   09/24/18 122/75   08/24/18 102/53    Weight from Last 3 Encounters:   10/26/18 138 lb (62.6 kg)   09/24/18 138 lb (62.6 kg)   08/24/18 138 lb (62.6 kg)              We Performed the Following     Creatinine     GASTROENTEROLOGY ADULT REF PROCEDURE ONLY [facility] [phone]; [facility]     ORTHOPEDICS ADULT REFERRAL     Urea nitrogen        Primary Care Provider Office Phone # Fax #    Dayday Alberts [phone]       [address] [facility] [address]        Equal Access to Services     [facility]: Hadii aad ku hadasho Soomaali, waaxda luqadaha, qaybta kaalmada adeegyada, waxcordell frye . So [facility] [phone].    ATENCIÓN: Si habla español, tiene a bhatti disposición servicios gratuitos de asistencia lingüística. Llame al [phone].    We comply with applicable federal civil rights laws and [place] laws. We do not discriminate on the basis of race, color, national origin, age, disability, sex, sexual orientation, or gender identity.            Thank you!     Thank you for choosing [facility]  for your care. Our goal is always to provide you with excellent care. Hearing back from our patients is one way we can continue to improve our services. Please take a few minutes to complete the written survey that you may receive in the mail after your visit with us. Thank you!             Your Updated Medication List - Protect others around you: Learn how to safely use, store and throw away your medicines at www.disposemymeds.org.          This list is accurate as of 10/26/18  3:46 PM.  Always use your most recent med list.                   Brand Name Dispense Instructions for use Diagnosis    amLODIPine 5 MG tablet    NORVASC    90 tablet    TAKE 1 TABLET (5 MG) BY MOUTH DAILY    Benign essential hypertension       B-12 1000 MCG Tbcr     100 tablet    Take  1,000 mcg by mouth daily    Cognitive impairment       BOOST BREEZE Liqd     90 each    Take 1 Can by mouth 3 times daily (with meals)    Cachexia (H)       calcium carbonate 500 mg-vitamin D 200 units 500-200 MG-UNIT per tablet    OSCAL with D;OYSTER SHELL CALCIUM    90 tablet    Take 1 tablet by mouth daily    Chronic lower back pain       cholecalciferol 1000 UNIT tablet    vitamin D3    90 tablet    Take 1 tablet (1,000 Units) by mouth daily    Vitamin D deficiency       ferrous gluconate 325 (36 Fe) MG Tabs     180 tablet    Take 1 tablet by mouth 2 times daily    Iron deficiency anemia, unspecified       finasteride 5 MG tablet    PROSCAR    90 tablet    TAKE 1 TABLET (5 MG) BY MOUTH DAILY    Benign non-nodular prostatic hyperplasia with lower urinary tract symptoms       gabapentin 100 MG capsule    NEURONTIN    60 capsule    TAKE 1-2 BY MOUTH AT BEDTIME AS NEEDED FOR SLEEP    Insomnia, unspecified type       lidocaine 5 % ointment    XYLOCAINE    142 g    One application 4 x daily to affected areas lower back and knees if necessary    Lesion of sciatic nerve, left       Menthol 10 % Aero     1 each    Externally apply 1 Dose topically 2 times daily as needed    Chronic low back pain, unspecified back pain laterality, with sciatica presence unspecified       MULTIvitamin  S Caps     90 capsule    Take 1 tablet by mouth daily    Iron deficiency anemia, unspecified       omeprazole 20 MG CR capsule    priLOSEC    180 capsule    TAKE 1 CAPSULE (20 MG) BY MOUTH 2 TIMES DAILY    Gastroesophageal reflux disease without esophagitis       order for DME     1 Device    Equipment being ordered: 4 wheel walker with brakes and cushioned seat    Spinal stenosis in cervical region, S/P laminectomy with spinal fusion, Weakness       oxybutynin 5 MG tablet    DITROPAN    90 tablet    Take 1 tablet (5 mg) by mouth 3 times daily    Overactive bladder       oxyCODONE IR 5 MG tablet    ROXICODONE    80 tablet    Take 1 tablet (5  mg) by mouth every 12 hours as needed for moderate to severe pain    Chronic pain syndrome, Spinal stenosis in cervical region       polyethylene glycol Packet    MIRALAX/GLYCOLAX    30 packet    Take 34 g by mouth daily    Thoracic spondylosis with myelopathy       pravastatin 40 MG tablet    PRAVACHOL    90 tablet    TAKE 1 TABLET BY MOUTH EVERY DAY    Pure hypercholesterolemia       * PROAIR  (90 Base) MCG/ACT inhaler   Generic drug:  albuterol     8.5 Inhaler    INHALE 2 PUFFS INTO THE LUNGS EVERY 6 HOURS AS NEEDED FOR SHORTNESS OF BREATH / DYSPNEA OR WHEEZING    Dyspnea, unspecified type       * albuterol 108 (90 Base) MCG/ACT inhaler    PROAIR HFA    8.5 Inhaler    INHALE 2 PUFFS INTO THE LUNGS EVERY 6 HOURS AS NEEDED FOR SHORTNESS OF BREATH / DYSPNEA OR WHEEZING    Dyspnea, unspecified type       senna-docusate 8.6-50 MG per tablet    SENOKOT-S;PERICOLACE    60 tablet    Take 1 tablet by mouth 2 times daily    Chronic pain syndrome       tamsulosin 0.4 MG capsule    FLOMAX    180 capsule    TAKE 1 CAPSULE (0.4 MG) BY MOUTH TWO TIMES DAILY    Benign non-nodular prostatic hyperplasia with lower urinary tract symptoms       * Notice:  This list has 2 medication(s) that are the same as other medications prescribed for you. Read the directions carefully, and ask your doctor or other care provider to review them with you.

## 2018-11-01 ENCOUNTER — HOSPITAL ENCOUNTER (OUTPATIENT)
Facility: AMBULATORY SURGERY CENTER | Age: 78
End: 2018-11-01
Attending: INTERNAL MEDICINE | Admitting: INTERNAL MEDICINE
Payer: COMMERCIAL

## 2018-11-03 RX ORDER — LIDOCAINE 40 MG/G
CREAM TOPICAL
Status: CANCELLED | OUTPATIENT
Start: 2018-11-03

## 2018-11-03 RX ORDER — ONDANSETRON 2 MG/ML
4 INJECTION INTRAMUSCULAR; INTRAVENOUS
Status: CANCELLED | OUTPATIENT
Start: 2018-11-03

## 2018-11-05 ENCOUNTER — TELEPHONE (OUTPATIENT)
Dept: FAMILY MEDICINE | Facility: CLINIC | Age: 78
End: 2018-11-05

## 2018-11-05 NOTE — TELEPHONE ENCOUNTER
"Called patient. He received a letter from Dr Alberts regarding his results. He wanted to know why his results were \"Good news\". Nurse went over the letter with him and explained his results compared to normal results. Patient verbalized understanding.     Prerna Alfonso RN    "

## 2018-11-05 NOTE — TELEPHONE ENCOUNTER
Reason for Call:  Request for results:    Name of test or procedure: Lab Results    Date of test of procedure: 10/26/18    Location of the test or procedure: [facility]    OK to leave the result message on voice mail or with a family member? YES    Phone number Patient can be reached at:  Cell number on file:    Telephone Information:   Mobile [phone]       Additional comments: Patient received a letter with the results from his labs. He has some questions about them. Please call back to discuss.    Call taken on 11/5/2018 at 3:12 PM by Vannesa Mercado

## 2018-11-08 ENCOUNTER — PATIENT OUTREACH (OUTPATIENT)
Dept: GERIATRIC MEDICINE | Facility: CLINIC | Age: 78
End: 2018-11-08

## 2018-11-08 DIAGNOSIS — Z76.89 HEALTH CARE HOME: ICD-10-CM
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
## 2018-11-19 ENCOUNTER — PATIENT OUTREACH (OUTPATIENT)
Dept: GERIATRIC MEDICINE | Facility: CLINIC | Age: 78
End: 2018-11-19

## 2018-11-19 NOTE — LETTER
November 19, 2018    LOREN PADILLA  [address]    [place] [address]    Dear Loren,     At [facility], we re dedicated to improving the health and well-being of our members.  Enclosed you will find the Comprehensive Care Plan that was developed with you on 10/22/2018. Please review the Care Plan carefully.    As a reminder, some of the things we discussed at your visit include:    Ways to improve or maintain your physical health such as walking 20 minutes a day.     Ways to reduce the risk of falls.     Health care needs you may have.     Don t forget to contact your care coordinator if you:    Have been hospitalized or plan to be hospitalized.     Have experienced a fall.      Have experienced a change in physical health, which may include bladder control and pain issues.      Are experiencing emotional problems.     If you do not agree with your Care Plan, have questions about it, or have experienced a change in your needs, please contact me, your care coordinator, at [phone]. If you are hearing impaired, please call the [place] Relay at 499 or [phone] (qitgvs-sl-tipvvd relay service).    Sincerely,       Jenise Evangelista, [facility]     [facility] is a health plan that contracts with both Medicare and the [place] Medical Assistance (Medicaid) program to provide benefits of both programs to enrollees. Enrollment in [facility] depends on contract renewal.    MSC+ [id number] ([id number])                                                  (12/16)

## 2018-11-19 NOTE — PROGRESS NOTES
[facility] Care Coordination Contact    Received after visit chart from care coordinator.  Completed following tasks: Mailed copy of care plan to client, Updated services in access, Submitted referrals/auths for homemaking and Entered MMIS  Chart was returned to CC.     Provider Signature - Summary:  Member indicates that they would like a summary of their POC shared with the following [facility] providers:  [facility].  Letter faxed to providers for signature.    Michelle Jason  Care Management Specialist Supervisor  [facility]  [phone]

## 2018-12-11 DIAGNOSIS — K21.9 GASTROESOPHAGEAL REFLUX DISEASE WITHOUT ESOPHAGITIS: ICD-10-CM

## 2018-12-13 ENCOUNTER — PATIENT OUTREACH (OUTPATIENT)
Dept: GERIATRIC MEDICINE | Facility: CLINIC | Age: 78
End: 2018-12-13
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
## 2018-12-18 ENCOUNTER — OFFICE VISIT (OUTPATIENT)
Dept: ORTHOPEDICS | Facility: CLINIC | Age: 78
End: 2018-12-18
Payer: COMMERCIAL

## 2018-12-18 VITALS
HEART RATE: 65 BPM | DIASTOLIC BLOOD PRESSURE: 67 MMHG | BODY MASS INDEX: 25.04 KG/M2 | WEIGHT: 138 LBS | OXYGEN SATURATION: 96 % | SYSTOLIC BLOOD PRESSURE: 136 MMHG | RESPIRATION RATE: 13 BRPM

## 2018-12-18 DIAGNOSIS — M79.5 RETAINED BULLET: Primary | ICD-10-CM

## 2018-12-18 PROCEDURE — 99203 OFFICE O/P NEW LOW 30 MIN: CPT | Performed by: ORTHOPAEDIC SURGERY
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
## 2018-12-20 ENCOUNTER — TRANSFERRED RECORDS (OUTPATIENT)
Dept: HEALTH INFORMATION MANAGEMENT | Facility: CLINIC | Age: 78
End: 2018-12-20

## 2018-12-26 NOTE — PROGRESS NOTES
[facility] Care Coordination Contact    2nd Attempt: Signed Letter not received from [facility], resent per process.     Hilda Alvarado  Case Management Specialist   [facility]   [phone]

## 2018-12-27 ENCOUNTER — PATIENT OUTREACH (OUTPATIENT)
Dept: GERIATRIC MEDICINE | Facility: CLINIC | Age: 78
End: 2018-12-27
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
## 2019-01-04 NOTE — PROGRESS NOTES
[facility] Care Coordination Contact    Call placed to client, left vm introducing myself, requested a return call.  Shea Mo RN, BC  Manager [facility] Care Coordinator   [phone]  (Fax)

## 2019-01-14 NOTE — PROGRESS NOTES
[facility] Care Coordination Contact    Rec'd transferred message that client is requesting a return call.  Call placed to client who inquired if this supervisor is aware of his request for a change in CC.   Explained that this CC is aware, CC reviewed note below. Explained that the direction provided to him for hmkg services is correct. CC provided info on [facility], where the initial referral was placed. Explained that his friend can work with any agency that is contracted with [facility] to provide EW services. Client has contact info to [facility], request that he f/u with his current CC in order to process this request and not delay services.     Client states that he would like a CC that will listen to him, stating dissatisfaction with the contract that current CC completed and mailed to him.     Explained that this is a second request to change his CC and that this will be reviewed and that I would f/u with him this week. Client stated understanding.   Shea Mo RN, BC  Manager [facility] Care Coordinator   [phone]  (Fax)

## 2019-01-18 ENCOUNTER — OFFICE VISIT (OUTPATIENT)
Dept: FAMILY MEDICINE | Facility: CLINIC | Age: 79
End: 2019-01-18
Payer: COMMERCIAL

## 2019-01-18 VITALS
WEIGHT: 141 LBS | SYSTOLIC BLOOD PRESSURE: 137 MMHG | HEART RATE: 76 BPM | BODY MASS INDEX: 25.95 KG/M2 | DIASTOLIC BLOOD PRESSURE: 64 MMHG | HEIGHT: 62 IN | TEMPERATURE: 98.3 F

## 2019-01-18 DIAGNOSIS — N40.1 BENIGN PROSTATIC HYPERPLASIA WITH WEAK URINARY STREAM: ICD-10-CM

## 2019-01-18 DIAGNOSIS — R06.00 DYSPNEA, UNSPECIFIED TYPE: Primary | ICD-10-CM

## 2019-01-18 DIAGNOSIS — R53.83 FATIGUE, UNSPECIFIED TYPE: ICD-10-CM

## 2019-01-18 DIAGNOSIS — Z87.891 HISTORY OF TOBACCO ABUSE: ICD-10-CM

## 2019-01-18 DIAGNOSIS — R39.12 BENIGN PROSTATIC HYPERPLASIA WITH WEAK URINARY STREAM: ICD-10-CM

## 2019-01-18 DIAGNOSIS — Z12.11 SCREEN FOR COLON CANCER: ICD-10-CM

## 2019-01-18 DIAGNOSIS — R73.01 IMPAIRED FASTING GLUCOSE: ICD-10-CM

## 2019-01-18 DIAGNOSIS — E78.00 PURE HYPERCHOLESTEROLEMIA: ICD-10-CM

## 2019-01-18 LAB
FEF 25/75: NORMAL
FEV-1: NORMAL
FEV1/FVC: NORMAL
FVC: NORMAL

## 2019-01-18 PROCEDURE — 94010 BREATHING CAPACITY TEST: CPT | Performed by: FAMILY MEDICINE

## 2019-01-18 PROCEDURE — 99214 OFFICE O/P EST MOD 30 MIN: CPT | Mod: 25 | Performed by: FAMILY MEDICINE

## 2019-01-18 RX ORDER — PRAVASTATIN SODIUM 40 MG
40 TABLET ORAL DAILY
Qty: 90 TABLET | Refills: 3 | Status: SHIPPED | OUTPATIENT
Start: 2019-01-18 | End: 2019-04-18

## 2019-01-18 ASSESSMENT — MIFFLIN-ST. JEOR: SCORE: 1242.79

## 2019-01-18 ASSESSMENT — PAIN SCALES - GENERAL: PAINLEVEL: NO PAIN (0)

## 2019-01-18 NOTE — PROGRESS NOTES
SUBJECTIVE:   Bhaskar Ott is a 78 year old male who presents to clinic today for the following health issues:       Hyperlipidemia Follow-Up      Rate your low fat/cholesterol diet?: good    Taking statin?  Yes, no muscle aches from statin    Other lipid medications/supplements?:  none    Hypertension Follow-up      Outpatient blood pressures are not being checked.    Low Salt Diet: low salt      Amount of exercise or physical activity: walks a lot    Problems taking medications regularly: No    Medication side effects: none    Diet: regular (no restrictions)        none    Problem list and histories reviewed & adjusted, as indicated.  Additional history: as documented         Reviewed and updated as needed this visit by clinical staff  Tobacco  Allergies  Meds  Med Hx  Surg Hx  Fam Hx  Soc Hx      Reviewed and updated as needed this visit by Provider          has a couple days of pravastatin left, needs refill    Not feeling so good overall    Had leg checked by orthopedics    We reviewed that note    Long term smoker    Had quit 2 months ago but had relapse    Sometimes hard to breathe through nose    Physical Exam   Constitutional: He is oriented to person, place, and time. He appears well-developed and well-nourished.   HENT:   Head: Normocephalic and atraumatic.   Eyes: Conjunctivae are normal.   Neck: Carotid bruit is not present.   Cardiovascular: Normal rate, regular rhythm, normal heart sounds and intact distal pulses.   Pulmonary/Chest: Effort normal and breath sounds normal. No respiratory distress.   Musculoskeletal: He exhibits no edema.   Neurological: He is alert and oriented to person, place, and time. No cranial nerve deficit.   Psychiatric: He has a normal mood and affect. His speech is normal and behavior is normal.     ASSESSMENT / PLAN:  (R06.00) Dyspnea, unspecified type  (primary encounter diagnosis)  Comment: spirometry surprisingly good.    Plan: Spirometry, Breathing Capacity:  Normal Order,         Clinic Performed        Encouraged increased exercise.    (R53.83) Fatigue, unspecified type  Comment: check labs   Plan: TSH with free T4 reflex, CBC with platelets         differential             (Z12.11) Screen for colon cancer  Comment: ordered colonoscopy.  Of note, even though this was not a physical or wellness visit officially we did address a lot of these issues.   Plan: GASTROENTEROLOGY ADULT REF PROCEDURE ONLY [facility] [phone]; [facility]         Surgery             (E78.00) Pure hypercholesterolemia  Comment: check labs when fasting.  Refill med.   Plan: pravastatin (PRAVACHOL) 40 MG tablet, Lipid         panel reflex to direct LDL Fasting,         Comprehensive metabolic panel             (R73.01) Impaired fasting glucose  Comment: check   Plan: Hemoglobin A1c             (N40.1,  R39.12) Benign prostatic hyperplasia with weak urinary stream  Comment: patient should see urology since he is on the higher dose of tamsulosin but still having significant symptoms.   Plan: UROLOGY ADULT REFERRAL             (Z87.471) History of tobacco abuse  Comment: discussed in detail   Plan: advised complete cessation.       I reviewed the patient's medications, allergies, medical history, family history, and social history.    Dayday Alberts MD      We did spirometry here   fvc and fev1 close to expected/ normal

## 2019-01-18 NOTE — PATIENT INSTRUCTIONS
Call to schedule fasting lab appointment    Call to schedule colonoscopy     Call to schedule urology appointment     Stay on same meds for now    Stay off smoking entirely    Increase  Walking as able

## 2019-01-21 DIAGNOSIS — R53.83 FATIGUE, UNSPECIFIED TYPE: ICD-10-CM

## 2019-01-21 DIAGNOSIS — R73.01 IMPAIRED FASTING GLUCOSE: ICD-10-CM

## 2019-01-21 DIAGNOSIS — E78.00 PURE HYPERCHOLESTEROLEMIA: ICD-10-CM

## 2019-01-21 LAB
ALBUMIN SERPL-MCNC: 3.7 G/DL (ref 3.4–5)
ALP SERPL-CCNC: 94 U/L (ref 40–150)
ALT SERPL W P-5'-P-CCNC: 25 U/L (ref 0–70)
ANION GAP SERPL CALCULATED.3IONS-SCNC: 5 MMOL/L (ref 3–14)
AST SERPL W P-5'-P-CCNC: 22 U/L (ref 0–45)
BASOPHILS # BLD AUTO: 0 10E9/L (ref 0–0.2)
BASOPHILS NFR BLD AUTO: 0.4 %
BILIRUB SERPL-MCNC: 0.4 MG/DL (ref 0.2–1.3)
BUN SERPL-MCNC: 32 MG/DL (ref 7–30)
CALCIUM SERPL-MCNC: 9 MG/DL (ref 8.5–10.1)
CHLORIDE SERPL-SCNC: 104 MMOL/L (ref 94–109)
CHOLEST SERPL-MCNC: 163 MG/DL
CO2 SERPL-SCNC: 29 MMOL/L (ref 20–32)
CREAT SERPL-MCNC: 1.26 MG/DL (ref 0.66–1.25)
DIFFERENTIAL METHOD BLD: ABNORMAL
EOSINOPHIL # BLD AUTO: 0.2 10E9/L (ref 0–0.7)
EOSINOPHIL NFR BLD AUTO: 2.7 %
ERYTHROCYTE [DISTWIDTH] IN BLOOD BY AUTOMATED COUNT: 12 % (ref 10–15)
GFR SERPL CREATININE-BSD FRML MDRD: 54 ML/MIN/{1.73_M2}
GLUCOSE SERPL-MCNC: 94 MG/DL (ref 70–99)
HBA1C MFR BLD: 5.4 % (ref 0–5.6)
HCT VFR BLD AUTO: 35.6 % (ref 40–53)
HDLC SERPL-MCNC: 75 MG/DL
HGB BLD-MCNC: 12.2 G/DL (ref 13.3–17.7)
LDLC SERPL CALC-MCNC: 77 MG/DL
LYMPHOCYTES # BLD AUTO: 1.3 10E9/L (ref 0.8–5.3)
LYMPHOCYTES NFR BLD AUTO: 18.8 %
MCH RBC QN AUTO: 31.5 PG (ref 26.5–33)
MCHC RBC AUTO-ENTMCNC: 34.3 G/DL (ref 31.5–36.5)
MCV RBC AUTO: 92 FL (ref 78–100)
MONOCYTES # BLD AUTO: 0.4 10E9/L (ref 0–1.3)
MONOCYTES NFR BLD AUTO: 5.9 %
NEUTROPHILS # BLD AUTO: 5.1 10E9/L (ref 1.6–8.3)
NEUTROPHILS NFR BLD AUTO: 72.2 %
NONHDLC SERPL-MCNC: 88 MG/DL
PLATELET # BLD AUTO: 215 10E9/L (ref 150–450)
POTASSIUM SERPL-SCNC: 4.5 MMOL/L (ref 3.4–5.3)
PROT SERPL-MCNC: 7 G/DL (ref 6.8–8.8)
RBC # BLD AUTO: 3.87 10E12/L (ref 4.4–5.9)
SODIUM SERPL-SCNC: 138 MMOL/L (ref 133–144)
TRIGL SERPL-MCNC: 57 MG/DL
TSH SERPL DL<=0.005 MIU/L-ACNC: 1.22 MU/L (ref 0.4–4)
WBC # BLD AUTO: 7.1 10E9/L (ref 4–11)

## 2019-01-21 PROCEDURE — 84443 ASSAY THYROID STIM HORMONE: CPT | Performed by: FAMILY MEDICINE

## 2019-01-21 PROCEDURE — 80061 LIPID PANEL: CPT | Performed by: FAMILY MEDICINE

## 2019-01-21 PROCEDURE — 36415 COLL VENOUS BLD VENIPUNCTURE: CPT | Performed by: FAMILY MEDICINE

## 2019-01-21 PROCEDURE — 83036 HEMOGLOBIN GLYCOSYLATED A1C: CPT | Performed by: FAMILY MEDICINE

## 2019-01-21 PROCEDURE — 80053 COMPREHEN METABOLIC PANEL: CPT | Performed by: FAMILY MEDICINE

## 2019-01-21 PROCEDURE — 85025 COMPLETE CBC W/AUTO DIFF WBC: CPT | Performed by: FAMILY MEDICINE

## 2019-01-21 NOTE — LETTER
[facility]   [address] [place]  [address]  [phone]                                   January 23, 2019    Bhaskar Ott  [address]    [facility] [id number]        Dear Bhaskar,    Your labs are all stable/ okay.    Results for orders placed or performed in visit on 01/21/19   Hemoglobin A1c   Result Value Ref Range    Hemoglobin A1C 5.4 0 - 5.6 %   CBC with platelets differential   Result Value Ref Range    WBC 7.1 4.0 - 11.0 10e9/L    RBC Count 3.87 (L) 4.4 - 5.9 10e12/L    Hemoglobin 12.2 (L) 13.3 - 17.7 g/dL    Hematocrit 35.6 (L) 40.0 - 53.0 %    MCV 92 78 - 100 fl    MCH 31.5 26.5 - 33.0 pg    MCHC 34.3 31.5 - 36.5 g/dL    RDW 12.0 10.0 - 15.0 %    Platelet Count 215 150 - 450 10e9/L    Diff Method Automated Method     % Neutrophils 72.2 %    % Lymphocytes 18.8 %    % Monocytes 5.9 %    % Eosinophils 2.7 %    % Basophils 0.4 %    Absolute Neutrophil 5.1 1.6 - 8.3 10e9/L    Absolute Lymphocytes 1.3 0.8 - 5.3 10e9/L    Absolute Monocytes 0.4 0.0 - 1.3 10e9/L    Absolute Eosinophils 0.2 0.0 - 0.7 10e9/L    Absolute Basophils 0.0 0.0 - 0.2 10e9/L   TSH with free T4 reflex   Result Value Ref Range    TSH 1.22 0.40 - 4.00 mU/L   Comprehensive metabolic panel   Result Value Ref Range    Sodium 138 133 - 144 mmol/L    Potassium 4.5 3.4 - 5.3 mmol/L    Chloride 104 94 - 109 mmol/L    Carbon Dioxide 29 20 - 32 mmol/L    Anion Gap 5 3 - 14 mmol/L    Glucose 94 70 - 99 mg/dL    Urea Nitrogen 32 (H) 7 - 30 mg/dL    Creatinine 1.26 (H) 0.66 - 1.25 mg/dL    GFR Estimate 54 (L) >60 mL/min/[1.73_m2]    GFR Estimate If Black 63 >60 mL/min/[1.73_m2]    Calcium 9.0 8.5 - 10.1 mg/dL    Bilirubin Total 0.4 0.2 - 1.3 mg/dL    Albumin 3.7 3.4 - 5.0 g/dL    Protein Total 7.0 6.8 - 8.8 g/dL    Alkaline Phosphatase 94 40 - 150 U/L    ALT 25 0 - 70 U/L    AST 22 0 - 45 U/L   Lipid panel reflex to direct LDL Fasting   Result Value Ref Range    Cholesterol 163 <200 mg/dL     Triglycerides 57 <150 mg/dL    HDL Cholesterol 75 >39 mg/dL    LDL Cholesterol Calculated 77 <100 mg/dL    Non HDL Cholesterol 88 <130 mg/dL       If you have any questions please call the clinic at [phone]    Sincerely,    Dayday Alberts MD  [email or web address]

## 2019-01-22 DIAGNOSIS — N40.1 BENIGN NON-NODULAR PROSTATIC HYPERPLASIA WITH LOWER URINARY TRACT SYMPTOMS: ICD-10-CM

## 2019-01-22 RX ORDER — TAMSULOSIN HYDROCHLORIDE 0.4 MG/1
CAPSULE ORAL
Qty: 180 CAPSULE | Refills: 1 | Status: SHIPPED | OUTPATIENT
Start: 2019-01-22 | End: 2019-05-03

## 2019-01-23 ENCOUNTER — PATIENT OUTREACH (OUTPATIENT)
Dept: GERIATRIC MEDICINE | Facility: CLINIC | Age: 79
End: 2019-01-23

## 2019-01-23 ENCOUNTER — TELEPHONE (OUTPATIENT)
Dept: FAMILY MEDICINE | Facility: CLINIC | Age: 79
End: 2019-01-23

## 2019-01-23 NOTE — PROGRESS NOTES
[facility] Care Coordination Contact    Rec'd vm from client requesting a return call.  Call placed to client, explained that he will be assigned a new CC effective 2/1/19. Explained that he will receive a letter in the mail with the name and contact info of his new CC.  Client inquired on getting his service started (hmkg). Client did not recall that CC provided information to him during our last telephone conversation. Client states that his friend does work for an agency. Enc'd client or his friend to call his current CC back to discuss agency options. Explained that if his friend is currently working for a contract homemaking agency, the referral could be placed quickly.   Client stated understanding.   Shea Mo RN, BC  Manager [facility] Care Coordinator   [phone]  (Fax)

## 2019-01-24 ENCOUNTER — PATIENT OUTREACH (OUTPATIENT)
Dept: GERIATRIC MEDICINE | Facility: CLINIC | Age: 79
End: 2019-01-24

## 2019-01-24 DIAGNOSIS — G47.00 INSOMNIA, UNSPECIFIED TYPE: ICD-10-CM

## 2019-01-24 RX ORDER — GABAPENTIN 100 MG/1
CAPSULE ORAL
Qty: 60 CAPSULE | Refills: 11 | Status: SHIPPED | OUTPATIENT
Start: 2019-01-24 | End: 2019-03-25

## 2019-01-24 NOTE — PROGRESS NOTES
Internal CC change effective 02/01/19.  Mailed member CC Change letter.    Michelle Jason  Care Management Specialist Supervisor  [facility]  [phone]

## 2019-01-24 NOTE — TELEPHONE ENCOUNTER
Requested Prescriptions   Pending Prescriptions Disp Refills     gabapentin (NEURONTIN) 100 MG capsule 60 capsule 0    There is no refill protocol information for this order         Last Written Prescription Date:  12-28-18  Last Fill Quantity: 60,   # refills: 0  Last Office Visit:   Future Office visit:       Routing refill request to provider for review/approval because:  Drug not on the G, P or [facility] refill protocol or controlled substance

## 2019-01-27 ENCOUNTER — NURSE TRIAGE (OUTPATIENT)
Dept: NURSING | Facility: CLINIC | Age: 79
End: 2019-01-27

## 2019-01-27 NOTE — TELEPHONE ENCOUNTER
Bhaskar is calling regarding a prescription that was suppose to be faxed to pharmacy and pharmacy has also requested a refill and no response.  Today Bhaskar is requesting Oxybutynin and FNA advised to phone back clinic tomorrow as FNA is not able to send message to clinic as [employer] is down with pool numbers.

## 2019-01-29 ENCOUNTER — PATIENT OUTREACH (OUTPATIENT)
Dept: GERIATRIC MEDICINE | Facility: CLINIC | Age: 79
End: 2019-01-29

## 2019-01-29 NOTE — PROGRESS NOTES
"Member called to report that he was missing his letter opener and a kitchen knife.  Asked if he reported it to the building SW.  He stated that he does not talk to any of \"those people\". Asked this care coordinator what I was going to do about it.  Told him this needed to be dealt with the SW of the building not me.    Also said he needed a ride next week.  Gave him Dahiana's number to assist.    Lupe Hyman RN  [facility]   [phone]  "

## 2019-01-30 NOTE — PROGRESS NOTES
[facility] Care Coordination Contact    No Letter Received: 60 day tracking of letter complete, no letter received from [facility]. Tracking discontinued.    Hilda Alvarado  Case Management Specialist   [facility]   [phone]

## 2019-02-08 ENCOUNTER — PATIENT OUTREACH (OUTPATIENT)
Dept: GERIATRIC MEDICINE | Facility: CLINIC | Age: 79
End: 2019-02-08
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
## 2019-02-12 ENCOUNTER — PATIENT OUTREACH (OUTPATIENT)
Dept: GERIATRIC MEDICINE | Facility: CLINIC | Age: 79
End: 2019-02-12
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
## 2019-02-14 ENCOUNTER — PATIENT OUTREACH (OUTPATIENT)
Dept: GERIATRIC MEDICINE | Facility: CLINIC | Age: 79
End: 2019-02-14

## 2019-02-14 NOTE — PROGRESS NOTES
Call to member to follow up after he fired his homemaker and the agency.  When asked member how he would like to move forward with a homemaker he said he was working on finding a new agency. When he finds a new agency he will let care coordinator know. If they are contracted with are will get them authorized and can get started.  Member agreed to call when he found an agency.     Lupe Hyman RN  [facility]   [phone]

## 2019-02-25 ENCOUNTER — OFFICE VISIT (OUTPATIENT)
Dept: FAMILY MEDICINE | Facility: CLINIC | Age: 79
End: 2019-02-25
Payer: COMMERCIAL

## 2019-02-25 VITALS
TEMPERATURE: 98.7 F | BODY MASS INDEX: 25.58 KG/M2 | SYSTOLIC BLOOD PRESSURE: 129 MMHG | WEIGHT: 141 LBS | DIASTOLIC BLOOD PRESSURE: 69 MMHG | HEART RATE: 78 BPM

## 2019-02-25 DIAGNOSIS — Z98.1 S/P LAMINECTOMY WITH SPINAL FUSION: ICD-10-CM

## 2019-02-25 DIAGNOSIS — M48.02 SPINAL STENOSIS IN CERVICAL REGION: ICD-10-CM

## 2019-02-25 DIAGNOSIS — M25.511 RIGHT SHOULDER PAIN, UNSPECIFIED CHRONICITY: Primary | ICD-10-CM

## 2019-02-25 PROCEDURE — 99213 OFFICE O/P EST LOW 20 MIN: CPT | Performed by: FAMILY MEDICINE

## 2019-02-25 ASSESSMENT — PAIN SCALES - GENERAL: PAINLEVEL: WORST PAIN (10)

## 2019-02-25 NOTE — PROGRESS NOTES
SUBJECTIVE:   Bhaskar Ott is a 78 year old male who presents to clinic today for the following health issues:       Follow up on right shoulder pain    none    Problem list and histories reviewed & adjusted, as indicated.  Additional history: as documented         Reviewed and updated as needed this visit by clinical staff       Reviewed and updated as needed this visit by Provider          no trauma    Started about 1 to 1 1/2 weeks ago    Hard to raise shoulder    Had to cancel the colonoscopy due to the shoulder pain          Neck range of motion fair, has some subj pain in neck but no radiculopathy type pain    Shoulder range of motion restricted both active and passive    Tender ant, superior, and posterior aspects of shoulder    Sensation and strength are okay in distal upper extremities      ASSESSMENT / PLAN:  (M25.511) Right shoulder pain, unspecified chronicity  (primary encounter diagnosis)  Comment: patient needs to see orthopedics.  Referral done and we helped schedule consult for patient.   Plan: ORTHOPEDICS ADULT REFERRAL             (M48.02) Spinal stenosis in cervical region  Comment: of note we completed [employer] mobility forms for patient.  Went over in detail.  Will send them in.   Plan:  As above     (Z98.1) S/P laminectomy with spinal fusion  Comment: patient chronically needs walker.  Occasional cane, but usally walker.   Plan: as above       I reviewed the patient's medications, allergies, medical history, family history, and social history.    Dayday Alberts MD

## 2019-02-28 ENCOUNTER — OFFICE VISIT (OUTPATIENT)
Dept: ORTHOPEDICS | Facility: CLINIC | Age: 79
End: 2019-02-28
Payer: COMMERCIAL

## 2019-02-28 ENCOUNTER — TELEPHONE (OUTPATIENT)
Dept: FAMILY MEDICINE | Facility: CLINIC | Age: 79
End: 2019-02-28

## 2019-02-28 ENCOUNTER — ANCILLARY PROCEDURE (OUTPATIENT)
Dept: GENERAL RADIOLOGY | Facility: CLINIC | Age: 79
End: 2019-02-28
Attending: ORTHOPAEDIC SURGERY
Payer: COMMERCIAL

## 2019-02-28 VITALS
WEIGHT: 140 LBS | HEART RATE: 77 BPM | OXYGEN SATURATION: 96 % | DIASTOLIC BLOOD PRESSURE: 71 MMHG | HEIGHT: 62 IN | BODY MASS INDEX: 25.76 KG/M2 | SYSTOLIC BLOOD PRESSURE: 137 MMHG | RESPIRATION RATE: 13 BRPM

## 2019-02-28 DIAGNOSIS — S80.851A FOREIGN BODY IN RIGHT LOWER EXTREMITY, INITIAL ENCOUNTER: ICD-10-CM

## 2019-02-28 DIAGNOSIS — M25.511 ACUTE PAIN OF RIGHT SHOULDER: ICD-10-CM

## 2019-02-28 DIAGNOSIS — M19.011 PRIMARY OSTEOARTHRITIS OF RIGHT SHOULDER: ICD-10-CM

## 2019-02-28 DIAGNOSIS — M25.511 ACUTE PAIN OF RIGHT SHOULDER: Primary | ICD-10-CM

## 2019-02-28 PROCEDURE — 99213 OFFICE O/P EST LOW 20 MIN: CPT | Mod: 25 | Performed by: ORTHOPAEDIC SURGERY

## 2019-02-28 PROCEDURE — 20610 DRAIN/INJ JOINT/BURSA W/O US: CPT | Mod: RT | Performed by: ORTHOPAEDIC SURGERY

## 2019-02-28 PROCEDURE — 73030 X-RAY EXAM OF SHOULDER: CPT | Mod: RT

## 2019-02-28 RX ADMIN — LIDOCAINE HYDROCHLORIDE 5 ML: 10 INJECTION, SOLUTION INFILTRATION; PERINEURAL at 15:50

## 2019-02-28 RX ADMIN — TRIAMCINOLONE ACETONIDE 80 MG: 40 INJECTION, SUSPENSION INTRA-ARTICULAR; INTRAMUSCULAR at 15:50

## 2019-02-28 ASSESSMENT — MIFFLIN-ST. JEOR: SCORE: 1238.26

## 2019-02-28 NOTE — TELEPHONE ENCOUNTER
Forms received from: [facility]   Phone number listed: [phone]   Fax listed: [phone]  Date received: 02/28/19  Form description: Transportation Cert  Once forms are completed, please return to [facility] via Fax.  Is patient requesting to be contacted when forms are completed: NA   Form placed: in providers [employer]ua [employer]

## 2019-02-28 NOTE — PATIENT INSTRUCTIONS
SMOKING CESSATION  What's in cigarette smoke? - Cigarette smoke contains over 4,000 chemicals. Nicotine is one of the main ingredients which is an insecticide/herbicide. It is poisonous to our nervous system, increases blood clotting risk, and decreases the body's defenses to fight off infection. Another chemical is Carbon Monoxide is an asphyxiating gas that permanently binds to blood cells and blocks the transport of oxygen. This leads to tissue death and decreases your metabolism. Tar is a chemical that coats your lungs and trachea which impairs new oxygen coming in and carbon dioxide getting out of your body.   How does smoking impact surgery? - Smoking is particularly hazardous with regards to surgery. Surgery puts stress on the body and a smoker's body is already under strain from these chemicals. Putting the two together, especially for an elective surgery, could be a recipe for disaster. Smoking before and after surgery increases your risk of heart problems, slow wound healing, delayed bone healing, blood clots, wound infection and anesthesia complications.   What are the benefits of quitting? - In 20 minutes your blood pressure will drop back down to normal. In 8 hours the carbon monoxide (a toxic gas) levels in your blood stream will drop by half, and oxygen levels will return to normal. In 48 hours your chance of having a heart attack will have decreased. All nicotine will have left your body. Your sense of taste and smell will return to a normal level. In 72 hours your bronchial tubes will relax, and your energy levels will increase. In 2 weeks your circulation will increase, and it will continue to improve for the next 10 weeks.    Recommendations for elective surgery - Ideally, patients should quit smoking 8 weeks before and at least 2 weeks after elective surgery in order to avoid complications. Simply cutting back on the amount of cigarettes smoked per day does not offer any benefit or decrease the  risk of poor wound healing, heart problems, and infection. Smokers should also start taking Vitamin C and B for two weeks before surgery and two weeks after surgery.    Ways to Stop Smokin. Nicotine patches, lozenges, or gum  2. Support Groups  3. Medications (see below)    List of Medications:  1. Varenicline Tartrate (CHANTIX)   2. Bupropion HCL (WELLBUTRIN, ZYBAN) - note: make sure Wellbutrin is for smoking cessation and not other issues   3. Nicotine Patch (NICODERM)   4. Nicotine Inhaler (NICOTROL)   5. Nicotine Gum Nicotine Polacrilex   6. Nicotine Lozenge: Nicotine Polacrilex (COMMIT)   * [place] offers a smoking support group as well!  Please visit: [email or web address]  If you are interested in these, ask about getting a prescription or talk to your primary care doctor about what may be the best way for you to quit.     Diagnosis:  Right shoulder osteoarthritis.    You have had a steroid injection today.  For the first 2 hours there will likely be some numbing in the joint from the lidocaine.  This is a good sign, indicating that the injection is in the right place.  In 2 hours the lidocaine will wear off, and the joint will hurt like you had a shot.  Each day the cortisone makes it feel better.  It reaches peak effect in 2 weeks.  We expect it to last for 3 months.  You may resume regular activity when you feel ready.  If you are diabetic, your glucoses will be quite high for several days.    If this doesn't work, we will consider total shoulder arthroplasty.
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
## 2019-02-28 NOTE — PROGRESS NOTES
Large Joint Injection/Arthocentesis: R glenohumeral joint  Date/Time: 2/28/2019 3:50 PM  Performed by: Rosales Livingston MD  Authorized by: Rosales Livingston MD     Indications:  Osteoarthritis  Needle Size:  22 G  Location:  Shoulder  Site:  R glenohumeral joint  Medications:  80 mg triamcinolone 40 MG/ML; 5 mL lidocaine 1 %     The patient's right shoulder was prepped with betadine solution after verification of allergies. Area approximately 10 cm x 10 cm prepped in a sterile fashion. After injection, betadine removed with soap and water and band-aids applied.

## 2019-03-03 PROBLEM — S80.851A: Status: ACTIVE | Noted: 2019-03-03

## 2019-03-03 RX ORDER — LIDOCAINE HYDROCHLORIDE 10 MG/ML
5 INJECTION, SOLUTION INFILTRATION; PERINEURAL
Status: DISCONTINUED | OUTPATIENT
Start: 2019-02-28 | End: 2020-09-24

## 2019-03-03 RX ORDER — TRIAMCINOLONE ACETONIDE 40 MG/ML
80 INJECTION, SUSPENSION INTRA-ARTICULAR; INTRAMUSCULAR
Status: DISCONTINUED | OUTPATIENT
Start: 2019-02-28 | End: 2020-09-24
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
## 2019-03-05 ENCOUNTER — OFFICE VISIT (OUTPATIENT)
Dept: FAMILY MEDICINE | Facility: CLINIC | Age: 79
End: 2019-03-05
Payer: COMMERCIAL

## 2019-03-05 VITALS
BODY MASS INDEX: 25.4 KG/M2 | TEMPERATURE: 98.1 F | HEART RATE: 78 BPM | WEIGHT: 140 LBS | SYSTOLIC BLOOD PRESSURE: 130 MMHG | DIASTOLIC BLOOD PRESSURE: 74 MMHG

## 2019-03-05 DIAGNOSIS — L84 CALLUS: ICD-10-CM

## 2019-03-05 DIAGNOSIS — L98.9 SKIN LESIONS: Primary | ICD-10-CM

## 2019-03-05 DIAGNOSIS — N52.9 ERECTILE DYSFUNCTION, UNSPECIFIED ERECTILE DYSFUNCTION TYPE: ICD-10-CM

## 2019-03-05 PROCEDURE — 99213 OFFICE O/P EST LOW 20 MIN: CPT | Performed by: FAMILY MEDICINE

## 2019-03-05 RX ORDER — SILDENAFIL 50 MG/1
TABLET, FILM COATED ORAL
Qty: 6 TABLET | Refills: 2 | Status: SHIPPED | OUTPATIENT
Start: 2019-03-05

## 2019-03-05 NOTE — PROGRESS NOTES
SUBJECTIVE:   Bhaskar Ott is a 78 year old male who presents to clinic today for the following health issues:       Sore and swollen fingers times 3 weeks    none    Problem list and histories reviewed & adjusted, as indicated.  Additional history: as documented         Reviewed and updated as needed this visit by clinical staff  Tobacco  Allergies  Meds  Med Hx  Surg Hx  Fam Hx  Soc Hx      Reviewed and updated as needed this visit by Provider          tries to clip hangnail    Can use pinky and thumb on both hands    Middle 3 fingers both hands    Patient has tried some 1st aid cream and bandage      Full physical not done     Mentation and affect are fine    No tremor of speech or extremity    On his fingers he has several deep cracks with calluses present  Tender on palpation  No evidence of infection    No drainage    Patient able to bend all the fingers okay    The middle 3 fingers on each hand are primarily affected      ASSESSMENT / PLAN:  (L98.9) Skin lesions  (primary encounter diagnosis)  Comment: discussed in detail with patient. The cold dry weather may have contributed to it. Do warm soaks, then after dabbing dry use antibiotic ointment and moisturizer lotion.  Trim / file calluses.    Plan: DERMATOLOGY REFERRAL        Follow up with dermatology if symptoms not resolving.     (N52.9) Erectile dysfunction, unspecified erectile dysfunction type  Comment: patient interested in this med.  Patient did have the heart stress test a little over a year ago, normal.    Plan: sildenafil (VIAGRA) 50 MG tablet       Discussed in detail.  Went over side effects in detail. Warned patient of cost of med.     (L84) Callus  Comment: discussed trimming/ filing down.   Plan: as above       I reviewed the patient's medications, allergies, medical history, family history, and social history.    Dayday Alberts MD

## 2019-03-05 NOTE — PATIENT INSTRUCTIONS
Do some warm soaks of hands, then right away after dabbing dry use ointment and moisturizing lotion to lock in moisture     Trim or file down the hard callused skin as needed    Okay to use bandages as needed    If symptoms not resolving then see dermatology

## 2019-03-15 ENCOUNTER — TELEPHONE (OUTPATIENT)
Dept: FAMILY MEDICINE | Facility: CLINIC | Age: 79
End: 2019-03-15

## 2019-03-15 NOTE — TELEPHONE ENCOUNTER
Called patient at [phone] (home). Left message on voicemail to return phone call to triage line at [phone].  Lilibeth Botello RN [facility] Triage.

## 2019-03-15 NOTE — TELEPHONE ENCOUNTER
Reason for Call:  Other / Pharmacy change    Detailed comments: Patient called and requested his pharmacy be changed from [facility] to [facility].     Phone Number Patient can be reached at: Home number on file [phone] (home)    Best Time: Anytime    Can we leave a detailed message on this number? YES    Call taken on 3/15/2019 at 3:03 PM by Cheryl Wade

## 2019-03-18 NOTE — TELEPHONE ENCOUNTER
Called patient back and he wanted the new pharmacy cued but does not request any medications sent to them at this time.     Prerna Alfonso RN

## 2019-03-18 NOTE — TELEPHONE ENCOUNTER
Patient returned call to RN line and left message for call back to him.  Idania Ansari RN  [facility]

## 2019-03-21 ENCOUNTER — DOCUMENTATION ONLY (OUTPATIENT)
Dept: CARE COORDINATION | Facility: CLINIC | Age: 79
End: 2019-03-21

## 2019-03-25 ENCOUNTER — PATIENT OUTREACH (OUTPATIENT)
Dept: GERIATRIC MEDICINE | Facility: CLINIC | Age: 79
End: 2019-03-25

## 2019-03-25 ENCOUNTER — TELEPHONE (OUTPATIENT)
Dept: FAMILY MEDICINE | Facility: CLINIC | Age: 79
End: 2019-03-25

## 2019-03-25 DIAGNOSIS — G47.00 INSOMNIA, UNSPECIFIED TYPE: ICD-10-CM

## 2019-03-25 RX ORDER — GABAPENTIN 100 MG/1
CAPSULE ORAL
Qty: 60 CAPSULE | Refills: 9 | Status: SHIPPED | OUTPATIENT
Start: 2019-03-25 | End: 2019-11-14

## 2019-03-25 NOTE — PROGRESS NOTES
"Call to member to see if he had found a homemaker yet.  He shared that he had not but he is \"managing\" without one.  Again asked that if he did find one, to let me know so the service can be authorized and the homemaker can get paid..  Asked how he was doing and member asked why I wanted to know.  Then did say \"same as usual\".    Lupe Hyman RN  [facility]   [phone]  "

## 2019-03-27 NOTE — TELEPHONE ENCOUNTER
Called pharmacy and there verified they do have the prescription but the patient needs to call in and give permission to send it. Called and informed patient he needs to call and he verbalized understanding.     Prerna Alfonso RN

## 2019-03-27 NOTE — TELEPHONE ENCOUNTER
[facility] and stated [facility] for gabapentin. Please res[facility] to [facility] at [phone]  [facility]  Team 3 Coordinator

## 2019-04-02 DIAGNOSIS — K21.9 GASTROESOPHAGEAL REFLUX DISEASE WITHOUT ESOPHAGITIS: ICD-10-CM

## 2019-04-02 DIAGNOSIS — G57.02 LESION OF SCIATIC NERVE, LEFT: ICD-10-CM

## 2019-04-02 RX ORDER — LIDOCAINE 50 MG/G
OINTMENT TOPICAL
Qty: 142 G | Refills: 11 | Status: SHIPPED | OUTPATIENT
Start: 2019-04-02

## 2019-04-02 NOTE — TELEPHONE ENCOUNTER
"Requested Prescriptions   Pending Prescriptions Disp Refills     lidocaine (XYLOCAINE) 5 % external ointment 142 g 11    Last Written Prescription Date:  2/28/19  Last Fill Quantity: 5ml,  # refills: 0   Last office visit: 3/5/2019 with prescribing provider:     Future Office Visit:     Sig: One application 4 x daily to affected areas lower back and knees if necessary    Topical Anesthetic Agents Passed - 4/2/2019  9:06 AM       Passed - Recent (12 mo) or future (30 days) visit within the authorizing provider's specialty    Patient had office visit in the last 12 months or has a visit in the next 30 days with authorizing provider or within the authorizing provider's specialty.  See \"Patient Info\" tab in inbasket, or \"Choose Columns\" in Meds & Orders section of the refill encounter.             Passed - Medication is active on med list       Passed - Patient is age 2 or older          "

## 2019-04-02 NOTE — TELEPHONE ENCOUNTER
"Requested Prescriptions   Pending Prescriptions Disp Refills     omeprazole (PRILOSEC) 20 MG DR capsule 180 capsule 1     Sig: TAKE 1 CAPSULE (20 MG) BY MOUTH 2 TIMES DAILY    PPI Protocol Passed - 4/2/2019 10:27 AM       Passed - Not on Clopidogrel (unless Pantoprazole ordered)       Passed - No diagnosis of osteoporosis on record       Passed - Recent (12 mo) or future (30 days) visit within the authorizing provider's specialty    Patient had office visit in the last 12 months or has a visit in the next 30 days with authorizing provider or within the authorizing provider's specialty.  See \"Patient Info\" tab in inbasket, or \"Choose Columns\" in Meds & Orders section of the refill encounter.             Passed - Medication is active on med list       Passed - Patient is age 18 or older        Last Written Prescription Date:  12/12/2018  Last Fill Quantity: 180,  # refills: 1   Last office visit: 3/5/2019 with prescribing provider:  [facility], [facility]     Future Office Visit:      Prescription approved per [facility] [place] Protocol.      Gretchen Marroquin RN  [facility]      "

## 2019-04-02 NOTE — TELEPHONE ENCOUNTER
Reason for Call:  Medication or medication refill:    Do you use a [facility]?  Name of the pharmacy and phone number for the current request[facility]    Name of the medication requested: omeprazole (PRILOSEC) 20 MG DR capsule    Other request: NONE    Can we leave a detailed message on this number? YES    Phone number patient can be reached at: Home number on file [phone] (home)    Best Time: Anytime    Call taken on 4/2/2019 at 10:20 AM by Julia Dunbar

## 2019-04-02 NOTE — TELEPHONE ENCOUNTER
Prescription approved per [facility] Refill Protocol.      Gretchen Marroquin RN  [facility]

## 2019-04-02 NOTE — TELEPHONE ENCOUNTER
Reason for Call:  Medication or medication refill:    Do you use a [facility]?  Name of the pharmacy and phone number for the current request:  [facility]    Name of the medication requested: lidocaine (XYLOCAINE) 5 % ointment    Other request:     Can we leave a detailed message on this number? YES    Phone number patient can be reached at: Home number on file [phone] (home)    Best Time: any    Call taken on 4/2/2019 at 9:03 AM by Charlee Vargas

## 2019-04-05 ENCOUNTER — TELEPHONE (OUTPATIENT)
Dept: FAMILY MEDICINE | Facility: CLINIC | Age: 79
End: 2019-04-05

## 2019-04-05 DIAGNOSIS — K21.9 GASTROESOPHAGEAL REFLUX DISEASE, ESOPHAGITIS PRESENCE NOT SPECIFIED: ICD-10-CM

## 2019-04-05 DIAGNOSIS — Z12.11 SCREEN FOR COLON CANCER: Primary | ICD-10-CM

## 2019-04-05 NOTE — TELEPHONE ENCOUNTER
Reason for Call:  Other / Requests call back    Detailed comments: Patient called and requested a call back to discuss whether his PCP would want him to have a colonoscopy and endoscopy.  Patient also stated that he has a dermatologist appointment 4/24 and he would also want to know if his PCP says yes to the colonoscopy and endoscopy, should they be done before the appointment on the 24th.  Please call patient back to discuss.    Phone Number Patient can be reached at: Cell number on file:    Telephone Information:   Mobile [phone]     Best Time: Anytime    Can we leave a detailed message on this number? YES    Call taken on 4/5/2019 at 1:55 PM by Cheryl Wade

## 2019-04-05 NOTE — TELEPHONE ENCOUNTER
Routing to PCP to review and advise.    Gretchen Marroquin RN  [facility]

## 2019-04-07 NOTE — TELEPHONE ENCOUNTER
I put in order for the upper endoscopy and colonoscopy.  Please give patient the number listed to call and schedule.  As long as these procedures are not the same week as the dermatology appointment, should be okay.    Please inform patient.    Dayday Alberts MD

## 2019-04-08 NOTE — TELEPHONE ENCOUNTER
Left message for pt with information to schedule appt.  [facility]  Team 3 Coordinator

## 2019-04-11 ENCOUNTER — PATIENT OUTREACH (OUTPATIENT)
Dept: GERIATRIC MEDICINE | Facility: CLINIC | Age: 79
End: 2019-04-11

## 2019-04-11 NOTE — PROGRESS NOTES
[facility] Care Coordination Contact      [facility] Six-Month Telephone Assessment    6 month telephone assessment completed on 4/11/19.    ER visits: No  Hospitalizations: No  TCU stays: No  Significant health status changes: no  Falls/Injuries: No  ADL/IADL changes: No  Changes in services: No, Has sent away a homemaker and refused any others.  Agreed to find one himself and let CM know if he does.  Reports that he is doing fine now without one.    Caregiver Assessment follow up:      Goals: See POC in chart for goal progress documentation.  Has declined [facility] for medication delivery and is still reading the bottles and said he is taking his meds the way he wants to. Has not seen eye doctor or had audiology exam by choice.    Will see member in 6 months for an annual health risk assessment.   Encouraged member to call CC with any questions or concerns in the meantime.   Lupe Hyman RN  [facility]   [phone]

## 2019-04-18 ENCOUNTER — TELEPHONE (OUTPATIENT)
Dept: FAMILY MEDICINE | Facility: CLINIC | Age: 79
End: 2019-04-18

## 2019-04-18 DIAGNOSIS — E78.00 PURE HYPERCHOLESTEROLEMIA: ICD-10-CM

## 2019-04-18 RX ORDER — PRAVASTATIN SODIUM 40 MG
40 TABLET ORAL DAILY
Qty: 90 TABLET | Refills: 2 | Status: SHIPPED | OUTPATIENT
Start: 2019-04-18 | End: 2020-01-17

## 2019-04-18 NOTE — TELEPHONE ENCOUNTER
Attempt # 1  Called patient at home number.[phone]   Was call answered?  No answer, left message to call nurse line at [phone]    Gretchen Marroquin RN  [facility]

## 2019-04-18 NOTE — TELEPHONE ENCOUNTER
Reason for Call:  Other prescription    Detailed comments: Patient would like script [facility] patient did not have the number. Will be out of medication in a few days.    Phone Number Patient can be reached at: Home number on file [phone] (home)    Best Time:     Can we leave a detailed message on this number? YES    Call taken on 4/18/2019 at 11:29 AM by Irasema De Leon

## 2019-04-18 NOTE — TELEPHONE ENCOUNTER
Patient called back to RN line which I picked up.   He spelled the name of the med for me, is pravastatin, not atorvastatin.     90 with 3 refills was sent to [facility] in January by Dr. Alberts so I just re-sent the remaining 3 refills to [facility] now.    LDL Cholesterol Calculated   Date Value Ref Range Status   01/21/2019 77 <100 mg/dL Final     Comment:     Desirable:       <100 mg/dl     Idania Ansari RN  [facility]

## 2019-04-18 NOTE — TELEPHONE ENCOUNTER
Patient returned call to RN line at 12:12 pm and left unintelligible message (I looked up patient via the caller ID number).  Idania Ansari RN  [facility]

## 2019-04-24 ENCOUNTER — TELEPHONE (OUTPATIENT)
Dept: FAMILY MEDICINE | Facility: CLINIC | Age: 79
End: 2019-04-24

## 2019-04-24 ENCOUNTER — OFFICE VISIT (OUTPATIENT)
Dept: DERMATOLOGY | Facility: CLINIC | Age: 79
End: 2019-04-24
Attending: FAMILY MEDICINE

## 2019-04-24 VITALS — HEART RATE: 75 BPM | SYSTOLIC BLOOD PRESSURE: 136 MMHG | DIASTOLIC BLOOD PRESSURE: 80 MMHG

## 2019-04-24 DIAGNOSIS — D48.5 NEOPLASM OF UNCERTAIN BEHAVIOR OF SKIN OF FACE: Primary | ICD-10-CM

## 2019-04-24 DIAGNOSIS — L30.9 CHRONIC DERMATITIS OF HANDS: ICD-10-CM

## 2019-04-24 RX ORDER — LIDOCAINE HYDROCHLORIDE AND EPINEPHRINE 10; 10 MG/ML; UG/ML
3 INJECTION, SOLUTION INFILTRATION; PERINEURAL ONCE
Status: DISCONTINUED | OUTPATIENT
Start: 2019-04-24 | End: 2020-09-24

## 2019-04-24 ASSESSMENT — PAIN SCALES - GENERAL
PAINLEVEL: NO PAIN (0)
PAINLEVEL: NO PAIN (0)

## 2019-04-24 NOTE — TELEPHONE ENCOUNTER
Reason for Call:  Other     Detailed comments: [facility] for care team to call regarding clarification on orders for upper endoscopy or colonoscopy - only received colonscopy but not order for upper endoscopy. Please advise.     Phone Number Patient can be reached at: Other phone number:  [phone]    Best Time: 8am - 4pm    Can we leave a detailed message on this number? YES    Call taken on 4/24/2019 at 8:59 AM by Renee Verdin

## 2019-04-24 NOTE — PATIENT INSTRUCTIONS

## 2019-04-24 NOTE — TELEPHONE ENCOUNTER
[facility] informed that order placed has both procedures noted.  No further questions.  Ratna Naik, RN

## 2019-04-24 NOTE — NURSING NOTE
Lidocaine-epinephrine 1-1:272773 % injection   2mL once for one use, starting 4/24/2019 ending 4/24/2019,  2mL disp, R-0, injection  Injected by Dr. James
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
## 2019-04-24 NOTE — NURSING NOTE
Dermatology Rooming Note    Bhaskar Ott's goals for this visit include:   Chief Complaint   Patient presents with     Derm Problem     Clark is here today for skin lesions on the tips of all of his fingers that have resolved. He feels his skin color has lightened overall.     Zeke Rivera, [facility]
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
## 2019-04-29 LAB — COPATH REPORT: NORMAL

## 2019-05-03 DIAGNOSIS — N32.81 OVERACTIVE BLADDER: ICD-10-CM

## 2019-05-03 DIAGNOSIS — N40.1 BENIGN NON-NODULAR PROSTATIC HYPERPLASIA WITH LOWER URINARY TRACT SYMPTOMS: ICD-10-CM

## 2019-05-03 RX ORDER — OXYBUTYNIN CHLORIDE 5 MG/1
5 TABLET ORAL 3 TIMES DAILY
Qty: 270 TABLET | Refills: 1 | Status: SHIPPED | OUTPATIENT
Start: 2019-05-03 | End: 2019-08-02

## 2019-05-03 RX ORDER — TAMSULOSIN HYDROCHLORIDE 0.4 MG/1
CAPSULE ORAL
Qty: 180 CAPSULE | Refills: 1 | Status: SHIPPED | OUTPATIENT
Start: 2019-05-03 | End: 2020-02-27

## 2019-05-03 NOTE — TELEPHONE ENCOUNTER
"Requested Prescriptions   Pending Prescriptions Disp Refills     tamsulosin (FLOMAX) 0.4 MG capsule 180 capsule 1   Last Written Prescription Date:  1/2/19  Last Fill Quantity: 180,  # refills: 1   Last office visit: 3/5/2019 with prescribing provider:     Future Office Visit:        Alpha Blockers Failed - 5/3/2019  9:24 AM        Failed - Patient does not have Tadalafil, Vardenafil, or Sildenafil on their medication list        Passed - Blood pressure under 140/90 in past 12 months     BP Readings from Last 3 Encounters:   04/24/19 136/80   03/05/19 130/74   02/28/19 137/71                 Passed - Recent (12 mo) or future (30 days) visit within the authorizing provider's specialty     Patient had office visit in the last 12 months or has a visit in the next 30 days with authorizing provider or within the authorizing provider's specialty.  See \"Patient Info\" tab in inbasket, or \"Choose Columns\" in Meds & Orders section of the refill encounter.              Passed - Medication is active on med list        Passed - Patient is 18 years of age or older        oxybutynin (DITROPAN) 5 MG tablet 270 tablet 1     Sig: Take 1 tablet (5 mg) by mouth 3 times daily   Last Written Prescription Date:  1/29/19  Last Fill Quantity: 270,  # refills: 1   Last office visit: 3/5/2019 with prescribing provider:     Future Office Visit:        Muscarinic Antagonists (Urinary Incontinence Agents) Passed - 5/3/2019  9:24 AM        Passed - Recent (12 mo) or future (30 days) visit within the authorizing provider's specialty     Patient had office visit in the last 12 months or has a visit in the next 30 days with authorizing provider or within the authorizing provider's specialty.  See \"Patient Info\" tab in inbasket, or \"Choose Columns\" in Meds & Orders section of the refill encounter.              Passed - Medication is Oxybutynin and patient is 5 years of age or older        Passed - Patient does not have a diagnosis of glaucoma on the " problem list     If glaucoma diagnosis is new, refer refill to physician.          Passed - Medication is active on med list        Passed - Patient is 18 years of age or older

## 2019-05-03 NOTE — TELEPHONE ENCOUNTER
Routing refill request to provider for review/approval because:  RX Protocol Failed.  Please review refill.  Thank you.  Ratna Naik RN

## 2019-05-06 ENCOUNTER — TRANSFERRED RECORDS (OUTPATIENT)
Dept: HEALTH INFORMATION MANAGEMENT | Facility: CLINIC | Age: 79
End: 2019-05-06

## 2019-05-07 ENCOUNTER — TELEPHONE (OUTPATIENT)
Dept: FAMILY MEDICINE | Facility: CLINIC | Age: 79
End: 2019-05-07

## 2019-05-30 ENCOUNTER — TELEPHONE (OUTPATIENT)
Dept: ONCOLOGY | Facility: CLINIC | Age: 79
End: 2019-05-30

## 2019-05-30 NOTE — TELEPHONE ENCOUNTER
Pt confirms being tobacco free over the last 8 years. Participated in a program at [facility]. Declined need for participation.     Tobacco Treatment Team at the [facility] attempted to reach Mr. Ott on 5/30/2019 regarding the tobacco cessation program to help Mr. Ott to quit smoking.

## 2019-06-13 ENCOUNTER — OFFICE VISIT (OUTPATIENT)
Dept: FAMILY MEDICINE | Facility: CLINIC | Age: 79
End: 2019-06-13
Payer: COMMERCIAL

## 2019-06-13 VITALS
SYSTOLIC BLOOD PRESSURE: 124 MMHG | HEART RATE: 68 BPM | BODY MASS INDEX: 24.86 KG/M2 | OXYGEN SATURATION: 99 % | DIASTOLIC BLOOD PRESSURE: 73 MMHG | TEMPERATURE: 97.7 F | WEIGHT: 137 LBS

## 2019-06-13 DIAGNOSIS — G89.29 CHRONIC LOW BACK PAIN, UNSPECIFIED BACK PAIN LATERALITY, WITH SCIATICA PRESENCE UNSPECIFIED: ICD-10-CM

## 2019-06-13 DIAGNOSIS — M16.12 PRIMARY OSTEOARTHRITIS OF LEFT HIP: Primary | ICD-10-CM

## 2019-06-13 DIAGNOSIS — Z98.890 HISTORY OF BACK SURGERY: ICD-10-CM

## 2019-06-13 DIAGNOSIS — M48.00 SPINAL STENOSIS, UNSPECIFIED SPINAL REGION: ICD-10-CM

## 2019-06-13 DIAGNOSIS — M25.511 RIGHT SHOULDER PAIN, UNSPECIFIED CHRONICITY: ICD-10-CM

## 2019-06-13 DIAGNOSIS — M54.5 CHRONIC LOW BACK PAIN, UNSPECIFIED BACK PAIN LATERALITY, WITH SCIATICA PRESENCE UNSPECIFIED: ICD-10-CM

## 2019-06-13 PROCEDURE — 99213 OFFICE O/P EST LOW 20 MIN: CPT | Performed by: FAMILY MEDICINE

## 2019-06-13 NOTE — PATIENT INSTRUCTIONS
[employer] form completed    Ask orthopedics about left hip in addition to right shoulder    Try to stay as active as possible

## 2019-06-13 NOTE — PROGRESS NOTES
Subjective     Bhaskar Ott is a 78 year old male who presents to clinic today for the following health issues:    HPI   Patient is here to get [employer] forms filled out.       right shoulder bothering a lot    To see orthopedics tomorrow              Reviewed and updated as needed this visit by Provider         Review of Systems   Shoulder getting worse    Walking some     Has walker    Patient wondering about diabetes    Has some tingling in fingers especially on left hand    Using [employer] mobility sometimes    Needs to use it more    4 spine surgeries    Some in neck     Some low back            Objective    /73 (BP Location: Left arm, Patient Position: Chair, Cuff Size: Adult Regular)   Pulse 68   Temp 97.7  F (36.5  C) (Oral)   Wt 62.1 kg (137 lb)   SpO2 99%   BMI 24.86 kg/m    Body mass index is 24.86 kg/m .  Physical Exam     Full physical not done     Mentation and affect are fine    No tremor of speech or extremity    Limited range of motion of right shoulder    ASSESSMENT / PLAN:  (M16.12) Primary osteoarthritis of left hip  (primary encounter diagnosis)  Comment: reviewed in chart where he has seen an orthopedist that advised regulo.  Since he is seeing orthopedics tomorrow for shoulder, advised him to mention hip also.   Plan: as above    (M54.5,  G89.29) Chronic low back pain, unspecified back pain laterality, with sciatica presence unspecified  Comment: of note we did complete the metro mobility application today   Plan: see in chart    (M48.00) Spinal stenosis, unspecified spinal region  Comment: as above   Plan:  As above     (Z98.890) History of back surgery  Comment: multiple past spine procedures   Plan: as above.  He is a good candidate for [employer] mobility.    I did encourage him to try to be as active as possible.  Goal is to maintain or hopefully increase strength/ independence.     (M25.511) Right shoulder pain, unspecified chronicity  Comment: to see orthopedics tomorrow  Plan:  as above       I reviewed the patient's medications, allergies, medical history, family history, and social history.    Dayday Alberts MD

## 2019-06-17 ENCOUNTER — OFFICE VISIT (OUTPATIENT)
Dept: ORTHOPEDICS | Facility: CLINIC | Age: 79
End: 2019-06-17
Payer: COMMERCIAL

## 2019-06-17 VITALS
RESPIRATION RATE: 16 BRPM | HEART RATE: 73 BPM | WEIGHT: 137 LBS | OXYGEN SATURATION: 95 % | DIASTOLIC BLOOD PRESSURE: 56 MMHG | BODY MASS INDEX: 24.86 KG/M2 | SYSTOLIC BLOOD PRESSURE: 97 MMHG

## 2019-06-17 DIAGNOSIS — S80.851D: Primary | ICD-10-CM

## 2019-06-17 DIAGNOSIS — M19.011 PRIMARY OSTEOARTHRITIS OF RIGHT SHOULDER: ICD-10-CM

## 2019-06-17 PROCEDURE — 99213 OFFICE O/P EST LOW 20 MIN: CPT | Performed by: ORTHOPAEDIC SURGERY

## 2019-06-17 NOTE — PROGRESS NOTES
HISTORY OF PRESENT ILLNESS:    Bhaskar Ott is a 78 year old male who is seen in follow up for   Chief Complaint   Patient presents with     RECHECK     Right shoulder OA last injection on 2/28/19.   Present symptoms: Patient reports for the past 4 to 6 weeks his right shoulder has become nearly unusable.  He states considerable amount of pain.  Patient is also stating bilateral legs with pain.  The left leg with nerve type pain.  He was supposed to have undergone a test for his left leg pain but was unable to tolerate it.  In regards to his right lower leg, patient reports it bothers him when he flexes his foot up and down.  Patient is hopeful to get his issues surgically corrected.  Treatments tried to this point: Right shoulder with previous cortisone injection that did not last very long at all.  Patient evaluation done with Dr. Livingston    Physical Exam:  Vitals: BP 97/56   Pulse 73   Resp 16   Wt 62.1 kg (137 lb)   SpO2 95%   BMI 24.86 kg/m    BMI= Body mass index is 24.86 kg/m .  Constitutional: healthy, alert and no acute distress   Psychiatric: mentation appears normal and affect normal/bright  NEURO: no focal deficits  SKIN: no excoriation or erythema. No signs of infection.  JOINT/EXTREMITIES:  Right lower extremity: When patient dorsiflex and plantarflex his right foot, a nodule can be visualized at the anterior shin.  Patient with pain on palpation just lateral to the scars along the mid anterior shin.  A firm nodule can be palpated corresponding to the x-rays as well as to patient's pain and discomfort.  Patient's right shoulder: Crepitus can be felt radiating through the right arm as well as is audible when patient attempts to mobilize his right shoulder.  He is able to draw his shoulder up to 90 degrees.  He does have appropriate strength but is limited secondary to pain.    IMAGING INTERPRETATION: X-rays of the right lower extremity reveal a piece of shrapnel that is within the tibial bone  itself as well as much smaller fragments surrounding.  There is also another larger fragment that appears lateral to the tibia and likely within the muscle region.  Patient's right shoulder: Patient with bone-on-bone osteoarthritis of the right shoulder.  There are no images of the left leg/thigh.  Independent visualization of the images was performed.     ASSESSMENT:    Chief Complaint   Patient presents with     RECHECK     Right shoulder OA last injection on 2/28/19.       ICD-10-CM    1. Foreign body in right lower extremity, subsequent encounter S80.851D [place]-Operative Worksheet   2. Primary osteoarthritis of right shoulder M19.011      Patient with foreign body of the right lower extremity that has become bothersome to the patient when he flexes and extends his right foot.  This is a palpable region.  Patient with severe osteoarthritis of the right shoulder that is no longer responding to conservative treatments.  Patient's left leg was not addressed today as there is been no work-up regarding this.    Plan:   Patient is going to require surgery for both the right lower extremity as well as the right shoulder.  Since patient is going to require a total shoulder arthroplasty on the right it is recommended he address the lower leg pain first due to risk for infections.  Patient is advised that it may not be possible to obtain all the shrapnel present.  He is advised that may not take away his pain as there may be scarring present.  Orders were placed today.  Patient is advised that 6 weeks later the right shoulder could be addressed using signature CT scan for planning purposes.  Patient does appear to understand.  Patient is further advised that with shoulder replacement, that he would be restricted from weightbearing on the right arm to approximately 15 pounds.  Patient is advised that we would not be able to prescribe NSAIDs due to his kidney issues.  He can continue with Tylenol for pain control and patient  is currently on oxycodone by his primary care provider.  He was also given the option of continued cortisone injection into the right shoulder if he chooses not to proceed with surgery.  Return to clinic 7 to 10 days postoperatively right lower extremity shrapnel excision.    BP Readings from Last 1 Encounters:   06/17/19 97/56     BP noted to be well controlled today in office.     Patient already given information regarding smoking cessation from his primary care provider    Scribed by  Alia Garcia PA-C   6/17/2019  5:12 PM      I attest I have seen and evaluated the patient.  I agree with above impression and plan.    Rosales Livingston MD
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
## 2019-06-17 NOTE — PATIENT INSTRUCTIONS
Surgery:  Right leg shrapnel removal.    Preop physical with primary physician is needed within 30 days of the surgery.  Nothing to eat or drink for 8 hours before surgery.  For same day surgery you need a ride.  Someone should stay with you for 12 hours afterward.  Stop blood thinners 5 days before surgery (aspirin, warfarin, anti-inflammatories).      Call [phone] to schedule your surgery.

## 2019-06-18 ENCOUNTER — TELEPHONE (OUTPATIENT)
Dept: ORTHOPEDICS | Facility: CLINIC | Age: 79
End: 2019-06-18

## 2019-06-18 ENCOUNTER — TELEPHONE (OUTPATIENT)
Dept: FAMILY MEDICINE | Facility: CLINIC | Age: 79
End: 2019-06-18

## 2019-06-18 NOTE — TELEPHONE ENCOUNTER
Spoke to Patient.  He states her prefers to speak to aminata tomorrow rather than myself when explained she is gone for the day.  Please call patient tomorrow when you return to clinic.  Ratna Naki RN

## 2019-06-18 NOTE — TELEPHONE ENCOUNTER
Reason for Call:  Medication or medication refill:    Do you use a [facility]?  Name of the pharmacy and phone number for the current request:  [facility] [phone]    Name of the medication requested: Flomax    Other request: PT requesting to speak with Gretchen.    Can we leave a detailed message on this number? YES    Phone number patient can be reached at: Home number on file [phone] (home)    Best Time: Anytime    Call taken on 6/18/2019 at 3:22 PM by Mamie Haskins

## 2019-06-18 NOTE — TELEPHONE ENCOUNTER
Reason for Call:  Medication or medication refill:    Do you use a [facility]?  Name of the pharmacy and phone number for the current request:  [facility]     Name of the medication requested:    tamsulosin (FLOMAX) 0.4 MG capsule.  PT stated [employer] has faxed over x2 and no response.  Could not verify fax #.  PT has not been taking medication since 6/11/19.      Other request: [facility] - [phone] (Phone)  [phone] (Fax)       Can we leave a detailed message on this number? YES    Phone number patient can be reached at: Home number on file [phone] (home)    Best Time: Anytime    Call taken on 6/18/2019 at 2:16 PM by Mamie Haskins

## 2019-06-18 NOTE — TELEPHONE ENCOUNTER
Attempt # 1  Called patient at home number.[phone]   Was call answered?  Yes, patient states is out of medication - states he did call pharmacy for refill,   Nurse called [facility] - Flomax last filled 4/20/2019 by [facility] [phone] nurse called [facility] - did fill on 4/20/2019 and should have pills until mid July.   Nurse called patient back, explained he should have two bottles of the Tamsulosin/Flomax - patient states he knows what he has and he did not get two bottles of the Flomax - nurse asked patient to look for a bottle marked Tamsulosin - patient said that is not the medication he needs - states he needs Finasteride nurse explained he has refills at [facility] pharmacy and may call them for a refill.     Routing to PCP to review and advise.    Possible need for referral to /HC for medication management.    Gretchen Marroquin RN  [facility]

## 2019-06-19 NOTE — TELEPHONE ENCOUNTER
Attempt # 1  Called patient at home number.[phone]   Was call answered?  Yes, patient repeating what was said yesterday,  verified with patient the finasteride is the one he is out of, nurse explained should have a refill at the [facility] pharmacy.  Nurse also advised of referral to [facility]  that may be able to help him with his medications so he does not run out again.      Gretchen Marroquin RN  [facility]

## 2019-07-15 ENCOUNTER — PATIENT OUTREACH (OUTPATIENT)
Dept: GERIATRIC MEDICINE | Facility: CLINIC | Age: 79
End: 2019-07-15

## 2019-07-15 NOTE — PROGRESS NOTES
Member was seen in [facility] ED on 7/14/19.  Care coordinator called today to get update but no answer.    Lupe Hyman RN  [facility]   [phone]

## 2019-07-28 ENCOUNTER — NURSE TRIAGE (OUTPATIENT)
Dept: NURSING | Facility: CLINIC | Age: 79
End: 2019-07-28

## 2019-07-28 NOTE — TELEPHONE ENCOUNTER
He needed the clinic address to schedule his ride.  Jane Cuellar RN-[facility]

## 2019-07-29 ENCOUNTER — OFFICE VISIT (OUTPATIENT)
Dept: ORTHOPEDICS | Facility: CLINIC | Age: 79
End: 2019-07-29
Payer: COMMERCIAL

## 2019-07-29 VITALS
HEIGHT: 63 IN | RESPIRATION RATE: 16 BRPM | BODY MASS INDEX: 24.27 KG/M2 | WEIGHT: 137 LBS | HEART RATE: 78 BPM | DIASTOLIC BLOOD PRESSURE: 70 MMHG | SYSTOLIC BLOOD PRESSURE: 102 MMHG

## 2019-07-29 DIAGNOSIS — M19.011 PRIMARY OSTEOARTHRITIS OF RIGHT SHOULDER: Primary | ICD-10-CM

## 2019-07-29 PROCEDURE — 99214 OFFICE O/P EST MOD 30 MIN: CPT | Performed by: ORTHOPAEDIC SURGERY

## 2019-07-29 ASSESSMENT — MIFFLIN-ST. JEOR: SCORE: 1236.56
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
## 2019-07-29 NOTE — PATIENT INSTRUCTIONS
Bhaskar Ott  and I talked about his condition and diagnosis.  Because of severe arthritis, and failure of conservative measures, we have decided to proceed with right  total shoulder arthroplasty.      We have talked in depth about the procedure and the risks, which include, but are not limited to:  * blood loss possibly requiring transfusion,   * blood clots with possible pulmonary embolism  * risk of dislocation.  There will be a motion restriction for 4-6 weeks.  Do not reach behind, do not push up off a chair or bed.  * infection or wound healing problems  * nerve damage   * vascular circulation problems  * continued pain  * Loosening or wear  * anesthetic risks  * The possibility of needing additional procedures.      We also talked about recovery time, which differs from patient to patient.    Average 1 night in the hospital.  Most people can return home at that point.  Physical Therapy will start at 2 weeks.    Preop xrays have been ordered, and medical clearance will need to be obtained.    Preop physical with primary physician is needed within 30 days of the surgery.  Nothing to eat or drink for 8 hours before surgery.  Stop blood thinners 5 days before surgery (aspirin, warfarin, anti-inflammatories).     We will schedule CT of the right shoulder first.  Once this is complete, we can have you schedule total shoulder arthroplasty.   Call [phone] to schedule your surgery.    - Please call [facility] at [phone] to schedule your MRI.     The [facility] is located at:   [address] [place] [address]    -  Once your MRI is scheduled, make an appointment to discuss the results with Dr. Rosales Livingston.  You can call [phone] to make this appointment, anytime 2-3 days after your MRI scan is performed. Dr. Livingston prefers patients to come in for a follow-up appointment to discuss next steps after the MRI.

## 2019-08-02 DIAGNOSIS — N32.81 OVERACTIVE BLADDER: ICD-10-CM

## 2019-08-02 NOTE — TELEPHONE ENCOUNTER
"Requested Prescriptions   Pending Prescriptions Disp Refills     oxybutynin (DITROPAN) 5 MG tablet [Pharmacy Med Name: OXYBUTYNIN 5 MG TABLET] 270 tablet 1     Sig: TAKE 1 TABLET (5 MG) BY MOUTH 3 TIMES DAILY   Last Written Prescription Date:  5-3-19  Last Fill Quantity: 270,  # refills: 1   Last office visit: 6/13/2019 with prescribing provider:  6-13-19   Future Office Visit:        Muscarinic Antagonists (Urinary Incontinence Agents) Passed - 8/2/2019  1:19 AM        Passed - Recent (12 mo) or future (30 days) visit within the authorizing provider's specialty     Patient had office visit in the last 12 months or has a visit in the next 30 days with authorizing provider or within the authorizing provider's specialty.  See \"Patient Info\" tab in inbasket, or \"Choose Columns\" in Meds & Orders section of the refill encounter.              Passed - Medication is Oxybutynin and patient is 5 years of age or older        Passed - Patient does not have a diagnosis of glaucoma on the problem list     If glaucoma diagnosis is new, refer refill to physician.          Passed - Medication is active on med list        Passed - Patient is 18 years of age or older          "
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
## 2019-08-06 RX ORDER — OXYBUTYNIN CHLORIDE 5 MG/1
5 TABLET ORAL 3 TIMES DAILY
Qty: 270 TABLET | Refills: 1 | Status: SHIPPED | OUTPATIENT
Start: 2019-08-06 | End: 2020-02-27

## 2019-08-06 NOTE — TELEPHONE ENCOUNTER
Prescription approved per [facility] Refill Protocol.      Gretchen Marroquin RN  [facility]

## 2019-08-15 DIAGNOSIS — I10 BENIGN ESSENTIAL HYPERTENSION: ICD-10-CM

## 2019-08-15 RX ORDER — AMLODIPINE BESYLATE 5 MG/1
5 TABLET ORAL DAILY
Qty: 90 TABLET | Refills: 0 | Status: SHIPPED | OUTPATIENT
Start: 2019-08-15 | End: 2019-11-14

## 2019-08-15 NOTE — TELEPHONE ENCOUNTER
"Requested Prescriptions   Pending Prescriptions Disp Refills     amLODIPine (NORVASC) 5 MG tablet 90 tablet 1     Sig: Take 1 tablet (5 mg) by mouth daily   Last Written Prescription Date:  11/7/18  Last Fill Quantity: 90,  # refills: 1   Last office visit: 6/13/2019 with prescribing provider:     Future Office Visit:        Calcium Channel Blockers Protocol  Failed - 8/15/2019 10:58 AM        Failed - Normal serum creatinine on file in past 12 months     Recent Labs   Lab Test 01/21/19  0849   CR 1.26*             Passed - Blood pressure under 140/90 in past 12 months     BP Readings from Last 3 Encounters:   07/29/19 102/70   06/17/19 97/56   06/13/19 124/73                 Passed - Recent (12 mo) or future (30 days) visit within the authorizing provider's specialty     Patient had office visit in the last 12 months or has a visit in the next 30 days with authorizing provider or within the authorizing provider's specialty.  See \"Patient Info\" tab in inbasket, or \"Choose Columns\" in Meds & Orders section of the refill encounter.              Passed - Medication is active on med list        Passed - Patient is age 18 or older          "

## 2019-10-07 DIAGNOSIS — H40.009 GLAUCOMA SUSPECT: Primary | ICD-10-CM

## 2019-10-11 ENCOUNTER — OFFICE VISIT (OUTPATIENT)
Dept: OPHTHALMOLOGY | Facility: CLINIC | Age: 79
End: 2019-10-11
Attending: OPHTHALMOLOGY
Payer: COMMERCIAL

## 2019-10-11 DIAGNOSIS — Z96.1 PSEUDOPHAKIA, LEFT EYE: Primary | ICD-10-CM

## 2019-10-11 DIAGNOSIS — H25.811 COMBINED FORMS OF AGE-RELATED CATARACT OF RIGHT EYE: ICD-10-CM

## 2019-10-11 DIAGNOSIS — H47.233 OPTIC CUPPING OF BOTH EYES: ICD-10-CM

## 2019-10-11 DIAGNOSIS — H40.003 GLAUCOMA SUSPECT OF BOTH EYES: ICD-10-CM

## 2019-10-11 PROBLEM — H25.819 COMBINED FORMS OF AGE-RELATED CATARACT: Status: ACTIVE | Noted: 2019-10-11

## 2019-10-11 PROCEDURE — 92015 DETERMINE REFRACTIVE STATE: CPT | Mod: ZF

## 2019-10-11 PROCEDURE — G0463 HOSPITAL OUTPT CLINIC VISIT: HCPCS | Mod: ZF

## 2019-10-11 ASSESSMENT — REFRACTION_MANIFEST
OD_ADD: +2.75
OS_SPHERE: -1.50
OS_AXIS: 105
OD_CYLINDER: +1.25
OS_ADD: +2.75
OS_CYLINDER: +1.00
OD_AXIS: 095
OD_SPHERE: -1.00

## 2019-10-11 ASSESSMENT — CUP TO DISC RATIO
OS_RATIO: 0.65
OD_RATIO: 0.5

## 2019-10-11 ASSESSMENT — EXTERNAL EXAM - LEFT EYE: OS_EXAM: NORMAL

## 2019-10-11 ASSESSMENT — TONOMETRY
OS_IOP_MMHG: 25
OD_IOP_MMHG: 20
IOP_METHOD: TONOPEN

## 2019-10-11 ASSESSMENT — VISUAL ACUITY
OD_CC: 20/70
OS_CC: 20/25
CORRECTION_TYPE: GLASSES
OD_PH_CC: 20/60
METHOD: SNELLEN - LINEAR

## 2019-10-11 ASSESSMENT — EXTERNAL EXAM - RIGHT EYE: OD_EXAM: NORMAL

## 2019-10-11 ASSESSMENT — CONF VISUAL FIELD
OD_NORMAL: 1
OS_NORMAL: 1

## 2019-10-11 ASSESSMENT — REFRACTION_WEARINGRX
OD_CYLINDER: +1.25
OS_CYLINDER: +1.00
OD_SPHERE: -1.00
OD_AXIS: 095
OS_SPHERE: -1.50
OS_AXIS: 104

## 2019-10-11 ASSESSMENT — SLIT LAMP EXAM - LIDS: COMMENTS: MGD

## 2019-10-11 NOTE — PROGRESS NOTES
Assessment & Plan       Bhaskar Ott is a 78 year old male with the following diagnoses:   1. Pseudophakia, left eye - Left Eye    2. Combined forms of age-related cataract of right eye - Right Eye    3. Glaucoma suspect of both eyes - Both Eyes    4. Optic cupping of both eyes - Both Eyes        IOL clear and centered   Cataract right eye worse  Intermittent pain left eye glaucoma suspect      Consult glaucoma eval       Patient disposition:   Return for Follow Up.          Complete documentation of historical and exam elements from today's encounter can be found in the full encounter summary report (not reduplicated in this progress note). I personally obtained the chief complaint(s) and history of present illness.  I confirmed and edited as necessary the review of systems, past medical/surgical history, family history, social history, and examination findings as documented by others; and I examined the patient myself. I personally reviewed the relevant tests, images, and reports as documented above. I formulated and edited as necessary the assessment and plan and discussed the findings and management plan with the patient and family.  Dr. Denilson Villarreal

## 2019-10-24 ENCOUNTER — TELEPHONE (OUTPATIENT)
Dept: ORTHOPEDICS | Facility: CLINIC | Age: 79
End: 2019-10-24

## 2019-10-24 ENCOUNTER — PATIENT OUTREACH (OUTPATIENT)
Dept: GERIATRIC MEDICINE | Facility: CLINIC | Age: 79
End: 2019-10-24

## 2019-10-24 ASSESSMENT — ACTIVITIES OF DAILY LIVING (ADL): DEPENDENT_IADLS:: INDEPENDENT

## 2019-10-24 NOTE — TELEPHONE ENCOUNTER
Called and spoke with Bhaskar regarding his records. Discussed with him that medical records would be the department to talk to. Discussed with him that he would have to have a release signed for them to send them. He voices his understanding. He will call back to records for more clarification.

## 2019-10-24 NOTE — TELEPHONE ENCOUNTER
Patient called back to clinic and wanted to know the status of his medical records and wants to know why it is taking so long for the records to be sent to the [facility].  See Message below.  Patient is wanting a call back today from the clinic.    Shea West  Patient Representative

## 2019-10-24 NOTE — PROGRESS NOTES
[facility] Care Coordination Contact    [facility] Home Visit Assessment     Home visit for Health Risk Assessment with Bhaskar Ott completed on October 24, 2019    Type of residence:: Apartment - handicap accessible,subsidized.  Was renovated in last year.  Apartment was clean and not cluttered. Has lived at [facility] for 10 years.   Current living arrangement:: I live alone     Assessment completed with:: Patient    Current Care Plan  Member currently receiving the following home care services:     Member currently receiving the following community resources: Transportation Services  Has refused dose med and homemaking and [employer] and home delivered meals.    Medication Review  Medication reconciliation completed in Epic: No  Medication set-up & administration: Independent-does not set up.  Self-administers medications.  Medication Risk Assessment Medication (1 or more, place referral to MTM): N/A: No risk factors identified  MTM Referral Placed: No: No risk factors idenified    Mental/Behavioral Health   Depression Screening: See PHQ assessment flowsheet.   Mental health DX:: Yes   Mental health DX how managed:: None  Mental Health Diagnosis: Yes: PTSD  Mental Health Services: None: No further intervention needed at this time.    Falls Assessment:   Fallen 2 or more times in the past year?: No   Any fall with injury in the past year?: No    ADL/IADL Dependencies:   Dependent ADLs:: Independent  Dependent IADLs:: Independent    [facility] Health Plan sponsored benefits: Shared information re: Silver Sneakers/gym memberships, ASA, Calcium +D.    PCA Assessment completed at visit: No     Elderly Waiver Eligibility: Yes-will continue on EW and No-does not meet criteria    Care Plan & Recommendations: Would like member to complete a hearing evaluation but member denies hearing loss.  Care coordinator had to speak loudly and repeat often.  Declines any services.  Asked care coordinator several times  "if I hd talked to previous care coordinators that member had \"fired\".  Asked member to call if he decided that he wanted any services.    See [facility] for detailed assessment information.    Follow-Up Plan: Member informed of future contact, plan to f/u with member with a 6 month telephone assessment.  Contact information shared with member and family, encouraged member to call with any questions or concerns at any time.    [facility] care continuum providers: Please refer to Health Care Home on the [facility] Problem List to view this patient's [facility] Care Plan Summary.    Lupe Hyman RN  [facility]   [phone]      "

## 2019-10-24 NOTE — TELEPHONE ENCOUNTER
Reason for call:  Other   Patient called regarding (reason for call): call back  Additional comments: Patient is calling because he needs his medical records sent over to the [facility] at fax number [phone]. I did give him medical records phone number as well. He says he has been trying to his his records sent over there for awhile now. Please call back to discuss     Phone number to reach patient:  Home number on file [phone] (home)    Best Time:  any    Can we leave a detailed message on this number?  YES

## 2019-10-28 ENCOUNTER — PATIENT OUTREACH (OUTPATIENT)
Dept: GERIATRIC MEDICINE | Facility: CLINIC | Age: 79
End: 2019-10-28

## 2019-10-28 NOTE — LETTER
October 28, 2019      LOREN PADILLA  [address]   [place] [address]      Dear Loren:    At [facility], we are dedicated to improving your health and well-being. Enclosed is the Comprehensive Care Plan that we developed with you on 10/21/19. Please review the Care Plan carefully.    As a reminder, some of the things we discussed at your visit include:    Your physical and mental health    Ways to reduce falls    Health care needs you may have    Don t forget to contact your care coordinator if you:    Have been hospitalized or plan to be hospitalized     Have had a fall     Have experienced a change in physical health    Are experiencing emotional problems     If you do not agree with your Care Plan, have questions about it, or have experienced a change in your needs, please call me at [phone]. If you are hearing impaired, please call the [place] [employer] at 647 or [phone] (zxquxj-kv-[id number] relay service).    Sincerely,    Lupe Hyman RN    E-mail: [email or web address]  Phone: [phone]      [facility] ([facility]) is a health plan that contracts with both Medicare and the [place] Medical Assistance (Medicaid) program to provide benefits of both programs to enrollees. Enrollment in [facility] depends on contract renewal.    [id number])     [id number] (11/18)

## 2019-10-28 NOTE — PROGRESS NOTES
[facility] Care Coordination Contact    Received after visit chart from care coordinator.  Completed following tasks: Mailed copy of care plan to client and Updated services in access  Chart was returned to IMMANUEL.   Dahiana Kim  Case Management Specialist  [facility]  [phone]

## 2019-11-01 ENCOUNTER — TELEPHONE (OUTPATIENT)
Dept: FAMILY MEDICINE | Facility: CLINIC | Age: 79
End: 2019-11-01

## 2019-11-01 NOTE — TELEPHONE ENCOUNTER
Forms received from: [facility]   Phone number listed: [phone]   Fax listed: [phone]  Date received: 11/01/2019  Form description: non prescription   Once forms are completed, please return to [employer] [facility] aparments via fax.  Form placed: in providers folder  Irasema De Leon

## 2019-11-11 ENCOUNTER — TELEPHONE (OUTPATIENT)
Dept: FAMILY MEDICINE | Facility: CLINIC | Age: 79
End: 2019-11-11

## 2019-11-11 NOTE — TELEPHONE ENCOUNTER
STEFFEN was brought to the  for pt to sign then be sent to medical records  Citlali [facility]  Team 3 Coordinator

## 2019-11-11 NOTE — TELEPHONE ENCOUNTER
Patient inquiring if imaging of right shoulder has been sent to [facility] as previously requested.  Per 10/24/2019 TE inform patient STEFFEN must be signed prior to release of medical records.      Writer spoke with GIA Velazquez, requesting STEFFEN to be placed at  with clinic information indicated in 10/24/2019.  Patient agreed to present to [facility] location to sign STEFFEN authorizing medical records to be sent to [facility].          10/24/19 11:49 AM   Note      Reason for call:  Other   Patient called regarding (reason for call): call back  Additional comments: Patient is calling because he needs his medical records sent over to the [facility] at fax number [phone]. I did give him medical records phone number as well. He says he has been trying to his his records sent over there for awhile now. Please call back to discuss      Phone number to reach patient:  Home number on file [phone] (home)     Best Time:  any     Can we leave a detailed message on this number?  YES          Justine Hines

## 2019-12-04 ENCOUNTER — OFFICE VISIT (OUTPATIENT)
Dept: OPHTHALMOLOGY | Facility: CLINIC | Age: 79
End: 2019-12-04
Attending: OPTOMETRIST
Payer: COMMERCIAL

## 2019-12-04 DIAGNOSIS — H40.009 GLAUCOMA SUSPECT: Primary | ICD-10-CM

## 2019-12-04 PROCEDURE — 92083 EXTENDED VISUAL FIELD XM: CPT | Mod: ZF | Performed by: OPHTHALMOLOGY

## 2019-12-04 PROCEDURE — 92133 CPTRZD OPH DX IMG PST SGM ON: CPT | Mod: ZF | Performed by: OPHTHALMOLOGY

## 2019-12-04 PROCEDURE — G0463 HOSPITAL OUTPT CLINIC VISIT: HCPCS | Mod: ZF

## 2019-12-04 ASSESSMENT — SLIT LAMP EXAM - LIDS: COMMENTS: MGD

## 2019-12-04 ASSESSMENT — REFRACTION_WEARINGRX
OS_AXIS: 104
OS_SPHERE: -1.50
OD_SPHERE: -1.00
OS_CYLINDER: +1.00
OD_CYLINDER: +1.25
OD_AXIS: 095

## 2019-12-04 ASSESSMENT — VISUAL ACUITY
OS_CC: 20/25
OD_CC: 20/80
OS_CC+: -2
CORRECTION_TYPE: GLASSES
OD_PH_CC: 20/60 SLOW
METHOD: SNELLEN - LINEAR
OD_PH_CC+: -1
OD_CC+: -1

## 2019-12-04 ASSESSMENT — CONF VISUAL FIELD
OS_NORMAL: 1
METHOD: COUNTING FINGERS
OD_NORMAL: 1

## 2019-12-04 ASSESSMENT — ENCOUNTER SYMPTOMS: JOINT SWELLING: 1

## 2019-12-04 ASSESSMENT — EXTERNAL EXAM - LEFT EYE: OS_EXAM: NORMAL

## 2019-12-04 ASSESSMENT — TONOMETRY
IOP_METHOD: APPLANATION
OD_IOP_MMHG: 12
OS_IOP_MMHG: 12

## 2019-12-04 ASSESSMENT — CUP TO DISC RATIO
OD_RATIO: 0.5
OS_RATIO: 0.65

## 2019-12-04 ASSESSMENT — EXTERNAL EXAM - RIGHT EYE: OD_EXAM: NORMAL

## 2019-12-04 NOTE — Clinical Note
Please have someone call this patient to set up a follow up appointment with Shubham for cataract extraction right eye (she operated on his left eye)

## 2019-12-04 NOTE — NURSING NOTE
Chief Complaint(s) and History of Present Illness(es)     Glaucoma Evaluation     In both eyes.  Associated symptoms include tearing (Significant tearing intermittently x the last few months especially when he yawns. ) and eye pain (Intermittent eye pain in LE x the last few months. Left side of face and head is very painful . No eye pain as of now. ).  Negative for redness and dryness.  Pain was noted as 0/10.              Comments     Pt is being referred as a glaucoma suspect, BE per Dr. Villarreal. Pt feels there has been a significant change in vision since being seen last in October 2019. Pt has not gotten the new glasses/rx that was given to him at the last visit.     Ocular meds: none    JUAN Pelayo 2:36 PM December 4, 2019

## 2019-12-04 NOTE — PROGRESS NOTES
CC: Glaucoma referral from Dr. Villarreal    HPI: Patient presents as a referral from Dr. Villarreal for glaucoma suspect due to increased cup to disc. Bhaskar notes that he relies more on his glasses especially for reading. He uses a magnifying glass for reading. He has left eye pain, once a month, it is sharp. Hx corneal erosion left eye. Denies visual changes with the eye pain or glare/starburst around lights. He has stable floaters in the right eye. No flashes of light. No other concerns.     PAST OCULAR HISTORY/PROCEDURES:  CEIOL left eye with Dr. Chi 2/16/2016  Corneal erosion left eye   Glaucoma suspect   Cataract, combined forms, right eye     MAXIMUM INTRAOCULAR PRESSURE:    20/25    MEDICATIONS:  No ocular medications    ANTICOAGULANTS/ANTIPLATELETS/FLOMAX:  Flomax 0.4 mg BID  No blood thinners or ASA     PMH:  HLD, HTN, OA, chronic low back pain, PTSD    FAMILY/SOCIAL HISTORY:        No family history of glaucoma that he is aware of     Former smoker, 2 ppd for 40-50 years, quit 10 years ago    TESTING:  OCT RNFL 12/4/19  right eye normal   Left eye borderline infratemporally    First field 24-2 12/4/19  right eye FP 28%, FN 25%, scatter, sup arcuate  Left eye  FP 0%, FN 33%, scatter and ? Sup arcuate    EXAM:  Nerves mild asymmetry 0.5/0.55, healthy NRR   Cataract right eye   PCIOL left eye     IMPRESSION:  Glaucoma suspect based on large cup to disc ratio    OCT RNFL with borderline thinning IT left eye otherwise normal range    IOP great   Nerves mild asymmetry, intact NRR   Visual field not consistent with exam, poor reliability   Visually significant cataract, right eye    Patient not dilated today but cataract visually significant, would benefit from cataract surgery  Pseudophakia, left eye     PLAN:  Low suspicion for glaucoma given low IOP, normal OCT RNFL  Deficits on visual field but first test today  Repeat 24-2 or GTOP in the future  Patient interested in cataract surgery, can return to Dr. Chi in  January      Kathia Dejesus MD  Ophthalmology Resident, PGY-3    Attending Physician Attestation:  Complete documentation of historical and exam elements from today's encounter can be found in the full encounter summary report (not reduplicated in this progress note). I personally obtained the chief complaint(s) and history of present illness. I confirmed and edited asnecessary the review of systems, past medical/surgical history, family history, social history, and examination findings as documented by others; and I examined the patient myself. I personally reviewed the relevant tests, images, and reports as documented above. I formulated and edited as necessary the assessment and plan and discussed the findings and management plan with the patient and family.  - Magalis Price MD 8:48 PM 12/4/2019

## 2019-12-13 ENCOUNTER — TELEPHONE (OUTPATIENT)
Dept: OPHTHALMOLOGY | Facility: CLINIC | Age: 79
End: 2019-12-13

## 2019-12-13 NOTE — TELEPHONE ENCOUNTER
Jori Muro's voicemail about setting up an appointment with Dr. Chi. I informed the patient they want to call the adult eye call center to set up a new patient appointment in clinic to establish care with Dr. Chi and discuss surgery per the advice of Dr. Price. Provided the call center number [phone].

## 2019-12-16 ENCOUNTER — TELEPHONE (OUTPATIENT)
Dept: OPHTHALMOLOGY | Facility: CLINIC | Age: 79
End: 2019-12-16

## 2019-12-16 NOTE — TELEPHONE ENCOUNTER
----- Message from Jaswinder Cornelius RN sent at 12/5/2019 11:02 AM CST -----  Regarding: FW: follow-up    ----- Message -----  From: Kathia Dejesus MD  Sent: 12/4/2019   5:02 PM CST  To: Stef Blackburn, [facility]  Subject: follow-up                                        Hi,     Can you please call patient to schedule follow-up with Dr. Chi in January?     THanks,  Anthony

## 2020-01-06 DIAGNOSIS — K21.9 GASTROESOPHAGEAL REFLUX DISEASE WITHOUT ESOPHAGITIS: ICD-10-CM

## 2020-01-06 NOTE — TELEPHONE ENCOUNTER
"Requested Prescriptions   Pending Prescriptions Disp Refills     omeprazole (PRILOSEC) 20 MG DR capsule [Pharmacy Med Name: OMEPRAZOLE DR CAPS 20MG] 180 capsule 4     Sig: TAKE 1 CAPSULE TWICE A DAY   Last Written Prescription Date:  4-2-19  Last Fill Quantity: 180,  # refills: 1   Last office visit: 6/13/2019 with prescribing provider:  6-13-19   Future Office Visit:        PPI Protocol Passed - 1/6/2020 12:19 PM        Passed - Not on Clopidogrel (unless Pantoprazole ordered)        Passed - No diagnosis of osteoporosis on record        Passed - Recent (12 mo) or future (30 days) visit within the authorizing provider's specialty     Patient has had an office visit with the authorizing provider or a provider within the authorizing providers department within the previous 12 mos or has a future within next 30 days. See \"Patient Info\" tab in inbasket, or \"Choose Columns\" in Meds & Orders section of the refill encounter.              Passed - Medication is active on med list        Passed - Patient is age 18 or older          "

## 2020-01-07 DIAGNOSIS — N40.1 BENIGN NON-NODULAR PROSTATIC HYPERPLASIA WITH LOWER URINARY TRACT SYMPTOMS: ICD-10-CM

## 2020-01-07 NOTE — TELEPHONE ENCOUNTER
Change of pharmacy    Requested Prescriptions   Pending Prescriptions Disp Refills     finasteride (PROSCAR) 5 MG tablet 90 tablet 3     Sig: Take 1 tablet (5 mg) by mouth daily       There is no refill protocol information for this order   Last Written Prescription Date:  9-17-19  Last Fill Quantity: 90,  # refills: 3   Last office visit: 6/13/2019 with prescribing provider:  6-13-19   Future Office Visit:

## 2020-01-08 RX ORDER — FINASTERIDE 5 MG/1
1 TABLET, FILM COATED ORAL DAILY
Qty: 90 TABLET | Refills: 3 | Status: SHIPPED | OUTPATIENT
Start: 2020-01-08 | End: 2020-11-03

## 2020-01-08 NOTE — TELEPHONE ENCOUNTER
Prescription approved per [facility] Refill Protocol.  Lilibeth Botello, RN,BSN  [facility]

## 2020-01-13 DIAGNOSIS — H25.811 COMBINED FORMS OF AGE-RELATED CATARACT OF RIGHT EYE: Primary | ICD-10-CM

## 2020-01-14 ENCOUNTER — OFFICE VISIT (OUTPATIENT)
Dept: OPHTHALMOLOGY | Facility: CLINIC | Age: 80
End: 2020-01-14
Attending: OPHTHALMOLOGY
Payer: COMMERCIAL

## 2020-01-14 ENCOUNTER — TELEPHONE (OUTPATIENT)
Dept: FAMILY MEDICINE | Facility: CLINIC | Age: 80
End: 2020-01-14

## 2020-01-14 ENCOUNTER — MEDICAL CORRESPONDENCE (OUTPATIENT)
Dept: HEALTH INFORMATION MANAGEMENT | Facility: CLINIC | Age: 80
End: 2020-01-14

## 2020-01-14 DIAGNOSIS — Z96.1 PSEUDOPHAKIA, LEFT EYE: ICD-10-CM

## 2020-01-14 DIAGNOSIS — H25.811 COMBINED FORMS OF AGE-RELATED CATARACT OF RIGHT EYE: Primary | ICD-10-CM

## 2020-01-14 PROCEDURE — G0463 HOSPITAL OUTPT CLINIC VISIT: HCPCS | Mod: ZF

## 2020-01-14 PROCEDURE — 76519 ECHO EXAM OF EYE: CPT | Mod: ZF | Performed by: OPHTHALMOLOGY

## 2020-01-14 ASSESSMENT — EXTERNAL EXAM - LEFT EYE: OS_EXAM: NORMAL

## 2020-01-14 ASSESSMENT — TONOMETRY
IOP_METHOD: TONOPEN
OS_IOP_MMHG: 13
OD_IOP_MMHG: 21

## 2020-01-14 ASSESSMENT — CUP TO DISC RATIO
OD_RATIO: 0.5
OS_RATIO: 0.65

## 2020-01-14 ASSESSMENT — REFRACTION_WEARINGRX
OD_CYLINDER: +1.25
OD_AXIS: 095
OD_SPHERE: -1.00
OS_CYLINDER: +1.00
OS_AXIS: 104
OS_SPHERE: -1.50

## 2020-01-14 ASSESSMENT — VISUAL ACUITY
OS_CC: 20/20
OD_PH_CC: 20/50
OD_PH_CC+: -2
METHOD: SNELLEN - LINEAR
OD_CC: 20/70
CORRECTION_TYPE: GLASSES
OD_CC+: +1

## 2020-01-14 ASSESSMENT — CONF VISUAL FIELD
OD_NORMAL: 1
METHOD: COUNTING FINGERS
OS_NORMAL: 1

## 2020-01-14 ASSESSMENT — SLIT LAMP EXAM - LIDS: COMMENTS: MGD

## 2020-01-14 ASSESSMENT — EXTERNAL EXAM - RIGHT EYE: OD_EXAM: NORMAL

## 2020-01-14 NOTE — PROGRESS NOTES
HPI  Bhaskar Ott is a 79 year old male here for cataract evaluation right eye. He has noted a continued slow decrease in his right eye vision over the last few years. Reading has become particularly difficult and he needs a magnifying glasses. He does endorse some glare at night, but no halos. No pain, redness, discharge.     POH:   Cataract surgery left eye 2/2016  Corneal erosion left eye  Glaucoma suspect    Assessment & Plan    (H25.811) Combined forms of age-related cataract of right eye  (primary encounter diagnosis)  Comment: Visually significant with BCVA of 20/50 and NS and PSC on exam.    Dilates to: 4.5 mm  Alpha blockers/Flomax: YES  Trauma/Pseudoxfoliation: None  Fuchs dystrophy/guttae: None    Diabetes: No  Anticoagulation: None    Cyl: 1.00 @ 105 OD (no toric)    Deep sulcus/small PF - retrobulbar block    We discussed the risks and benefits of cataract surgery in the left eye, and informed consent was obtained.  Proceed with CE/IOL OD.    Surgical plan:  MAC/RB  Malyugin ring  Aim emmetropia  (left eye with 16.5D SN60WF - will match with SN60WF left eye also)    (Z96.1) Pseudophakia, left eye   Comment: Clear visual axis, some anterior capsular phimosis.  Plan: Given updated glasses Rx.    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 Pachy is thick OU (OS a bit thicker than OD). Prior specular microscopy shows decent endothelial cell morphology with equal density in both eyes. Prior topography with mild irregular astigmatism inferiorly left eye due to dryness.  Plan: Observe    (H40.003) Glaucoma suspect, both eyes  Comment: Based on increased cup to disc ratio/asymmetry. Thick pachy with good IOPs today.    FH: Negative  Pachymetry: 629/679 (2015)  Gonioscopy:  Tmax:   Today's IOP: 21/13  Target IOP:  Current medications: None  Meds to avoid:  Visual field: First field 24-2 12/4/19  right eye FP 28%, FN 25%, scatter, sup arcuate  Left eye  FP 0%, FN 33%, scatter  and ? Sup arcuate  Nerve OCT: OCT RNFL 12/4/19  right eye normal   Left eye borderline infratemporally    Plan: Monitor     -----------------------------------------------------------------------------------    Patient disposition:   Return for scheduled procedure. or sooner as needed.    Teaching statement:  Complete documentation of historical and exam elements from today's encounter can be found in the full encounter summary report (not reduplicated in this progress note). I personally obtained the chief complaint(s) and history of present illness.  I confirmed and edited as necessary the review of systems, past medical/surgical history, family history, social history, and examination findings as documented by others; and I examined the patient myself. I personally reviewed the relevant tests, images, and reports as documented above.     I formulated and edited as necessary the assessment and plan and discussed the findings and management plan with the patient and family.      Tata Chi MD  Comprehensive Ophthalmology & Ocular Pathology  Department of Ophthalmology and Visual Neurosciences  [email or web address]  Pager [phone]

## 2020-01-14 NOTE — TELEPHONE ENCOUNTER
Forms received from: [employer]   Phone number listed: [phone]   Fax listed: [phone]  Date received: 01.14.20  Form description: Equipment  Once forms are completed, please return to [employer] via Fax.  Is patient requesting to be contacted when forms are completed: NA    Form placed: in providers basket  Citlaliher Carcamo

## 2020-01-14 NOTE — NURSING NOTE
Chief Complaints and History of Present Illnesses   Patient presents with     Consult For     cataract     Chief Complaint(s) and History of Present Illness(es)     Consult For     Laterality: right eye    Associated symptoms: glare, floaters (right eye) and eye pain.  Negative for redness    Pain scale: 0/10 (None currently, he did take oxycodone this morning for back pain.)    Comments: cataract              Comments     He states that his vision in the right eye has seemed decreased over the past 2 years.  Changes in lighting seem to make vision worse as refocusing takes a longer time.  His vision in the right eye seems fuzzy and out of focus all of the time.    His left eye is painful at times.  He states that he has gone to the ED several times for pain on the left side of his head.  Currently he has eye pain and ear pain (no jaw pain or headache).    WADE Bennett 9:23 AM  January 14, 2020

## 2020-01-15 ENCOUNTER — TELEPHONE (OUTPATIENT)
Dept: OPHTHALMOLOGY | Facility: CLINIC | Age: 80
End: 2020-01-15

## 2020-01-15 ENCOUNTER — HOSPITAL ENCOUNTER (OUTPATIENT)
Facility: AMBULATORY SURGERY CENTER | Age: 80
End: 2020-01-15
Attending: OPHTHALMOLOGY
Payer: COMMERCIAL

## 2020-01-15 DIAGNOSIS — H25.811 COMBINED FORMS OF AGE-RELATED CATARACT OF RIGHT EYE: ICD-10-CM

## 2020-01-15 DIAGNOSIS — E78.00 PURE HYPERCHOLESTEROLEMIA: ICD-10-CM

## 2020-01-15 NOTE — TELEPHONE ENCOUNTER
"Requested Prescriptions   Pending Prescriptions Disp Refills     pravastatin (PRAVACHOL) 40 MG tablet [Pharmacy Med Name: PRAVASTATIN TABS 40MG] 90 tablet 4     Sig: TAKE 1 TABLET DAILY   Last Written Prescription Date:  4/18/19  Last Fill Quantity: 90,  # refills: 2   Last office visit: 6/13/2019 with prescribing provider:     Future Office Visit:        Statins Protocol Passed - 1/15/2020  3:02 PM        Passed - LDL on file in past 12 months     Recent Labs   Lab Test 01/21/19  0849   LDL 77             Passed - No abnormal creatine kinase in past 12 months     No lab results found.             Passed - Recent (12 mo) or future (30 days) visit within the authorizing provider's specialty     Patient has had an office visit with the authorizing provider or a provider within the authorizing providers department within the previous 12 mos or has a future within next 30 days. See \"Patient Info\" tab in inbasket, or \"Choose Columns\" in Meds & Orders section of the refill encounter.              Passed - Medication is active on med list        Passed - Patient is age 18 or older          "

## 2020-01-15 NOTE — TELEPHONE ENCOUNTER
Spoke with patient to schedule surgery with Dr. Tata Chi    Surgery was scheduled on 5/15/2020 at [facility]  Patient will have H&P at [facility] with Dr. Alberts   Post-Op visit was scheduled on 5/15 and 6/3  Patient is aware a / is needed day of surgery.   Surgery packet was mailed, patient has my direct contact information for any further questions.     [phone]

## 2020-01-17 RX ORDER — PRAVASTATIN SODIUM 40 MG
TABLET ORAL
Qty: 90 TABLET | Refills: 0 | Status: SHIPPED | OUTPATIENT
Start: 2020-01-17 | End: 2020-04-15

## 2020-01-17 NOTE — TELEPHONE ENCOUNTER
Prescription approved per [facility] Refill Protocol.    Beatriz Spivey, RN, BSN, PHN  [facility] [place]

## 2020-02-11 ENCOUNTER — TELEPHONE (OUTPATIENT)
Dept: FAMILY MEDICINE | Facility: CLINIC | Age: 80
End: 2020-02-11

## 2020-02-11 NOTE — TELEPHONE ENCOUNTER
Forms received from: [employer]   Phone number listed: [phone]   Fax listed: [phone]  Date received: 02.11.20  Form description: [employer]  Once forms are completed, please return to [employer] via Fax.  Is patient requesting to be contacted when forms are completed: NA  In providers basket  Form placed: in providers akua Carcamo

## 2020-02-25 ENCOUNTER — TRANSFERRED RECORDS (OUTPATIENT)
Dept: HEALTH INFORMATION MANAGEMENT | Facility: CLINIC | Age: 80
End: 2020-02-25

## 2020-02-27 ENCOUNTER — PATIENT OUTREACH (OUTPATIENT)
Dept: GERIATRIC MEDICINE | Facility: CLINIC | Age: 80
End: 2020-02-27

## 2020-02-27 NOTE — PROGRESS NOTES
"[facility] Care Coordination Contact    Call from member to ask if he could get extra help after his surgery.  Was unable to say what surgery he was having.  Care coordinator pulled up [facility] and saw that member had been to [facility] for should arthritis.  The orthopedist said that arthroplasty was appropriate.  Member had taken a greyhound bus to get to that appointment.  Asked member if he would consider going back to his ortho, Dr. Livingston to discuss shoulder again.  The ortho could see the [place] notes and make a plan with member.  Transportation to and from [place] would be an issue as well as member being discharged alone.  He has no friends or family.   Member once again started to talk about his \"problem\". Asked what problem that would be. He said he wanted to know what his previous care coordinator talked about when he was referred to me.  This care coordinator asked member to talk to his primary about any medical problems and member said this is a mental one.  Again, said to talk to primary who could refer for counseling or prescribe medication if indicated. Member said he would call his previous orthopedist.   "

## 2020-02-28 ENCOUNTER — PATIENT OUTREACH (OUTPATIENT)
Dept: GERIATRIC MEDICINE | Facility: CLINIC | Age: 80
End: 2020-02-28

## 2020-02-28 NOTE — PROGRESS NOTES
Call to [facility] to find out more details of possible upcoming surgery in April.  Asked nurse if this would be an overnight stay and date of surgery.  Member would need to get prior auth for transportation over 60 miles.  Nurse reports that member will have an appointment at [facility] on April 9.  Gave no date on surgery without release of information signed by member.  She will send a STEFFEN to member for him to sign and send back.  This care coordinator called member and left voice message with all of that information and to call with questions.    Lupe Hyman RN  [facility]   [phone]

## 2020-03-16 ENCOUNTER — TELEPHONE (OUTPATIENT)
Dept: FAMILY MEDICINE | Facility: CLINIC | Age: 80
End: 2020-03-16

## 2020-03-16 DIAGNOSIS — K21.9 GASTROESOPHAGEAL REFLUX DISEASE WITHOUT ESOPHAGITIS: ICD-10-CM

## 2020-03-16 NOTE — TELEPHONE ENCOUNTER
Reason for Call:  Other / Appointment     Detailed comments: Patient called and stated he has a follow up appointment with the orthopedic doctor and he would like to know if the doctor will be available and if so can patient keep his appointment.    Phone Number Patient can be reached at: Home number on file [phone] (home)    Best Time: ASAP    Can we leave a detailed message on this number? YES    Call taken on 3/16/2020 at 12:38 PM by Cheryl Wade

## 2020-03-17 NOTE — TELEPHONE ENCOUNTER
"Requested Prescriptions   Pending Prescriptions Disp Refills     omeprazole (PRILOSEC) 20 MG DR capsule [Pharmacy Med Name: OMEPRAZOLE DR CAPS 20MG] 180 capsule 3     Sig: TAKE 1 CAPSULE TWICE A DAY   Last Written Prescription Date:  1/7/20  Last Fill Quantity: 180,  # refills: 0   Last office visit: 6/13/2019 with prescribing provider:     Future Office Visit:        PPI Protocol Passed - 3/16/2020 10:41 AM        Passed - Not on Clopidogrel (unless Pantoprazole ordered)        Passed - No diagnosis of osteoporosis on record        Passed - Recent (12 mo) or future (30 days) visit within the authorizing provider's specialty     Patient has had an office visit with the authorizing provider or a provider within the authorizing providers department within the previous 12 mos or has a future within next 30 days. See \"Patient Info\" tab in inbasket, or \"Choose Columns\" in Meds & Orders section of the refill encounter.              Passed - Medication is active on med list        Passed - Patient is age 18 or older             "

## 2020-03-19 ENCOUNTER — TELEPHONE (OUTPATIENT)
Dept: FAMILY MEDICINE | Facility: CLINIC | Age: 80
End: 2020-03-19

## 2020-03-19 DIAGNOSIS — D64.9 ANEMIA, UNSPECIFIED TYPE: ICD-10-CM

## 2020-03-19 DIAGNOSIS — R79.89 ELEVATED VITAMIN B12 LEVEL: Primary | ICD-10-CM

## 2020-03-19 NOTE — TELEPHONE ENCOUNTER
Receive fax for lab orders from [facility] fax [phone]    They want labs done and patient  Apparently wanted orders sent to our clinic lab.  I put in future orders.    When I get results we can fax to above  Number.    Dayday Alberts MD

## 2020-04-07 DIAGNOSIS — G47.00 INSOMNIA, UNSPECIFIED TYPE: ICD-10-CM

## 2020-04-07 RX ORDER — GABAPENTIN 100 MG/1
CAPSULE ORAL
Qty: 60 CAPSULE | Refills: 2 | Status: SHIPPED | OUTPATIENT
Start: 2020-04-07 | End: 2020-06-24

## 2020-04-07 NOTE — TELEPHONE ENCOUNTER
Reason for Call:  Medication or medication refill:    Do you use a [facility]?  Name of the pharmacy and phone number for the current request:  [facility] : [address], [place], [place]    Name of the medication requested: gabapentin (NEURONTIN)    Other request: Please call patient if any questions.    Can we leave a detailed message on this number? YES    Phone number patient can be reached at: Home number on file [phone] (home)    Best Time: Anytime    Call taken on 4/7/2020 at 11:36 AM by Vannesa Mercado

## 2020-04-09 ENCOUNTER — PATIENT OUTREACH (OUTPATIENT)
Dept: GERIATRIC MEDICINE | Facility: CLINIC | Age: 80
End: 2020-04-09

## 2020-04-09 NOTE — PROGRESS NOTES
"Called member to do the 6 months telephonic assessment.  He states that he will not answer care coordinator questions because he has filed a grievance against this care coordinator.  Asked member a few questions but he said he would not answer. Asked if I understood that he had filed the grievance.  Care coordinator said I did but would like to continue with the 6 month screening and that I did not know if he would be able to change care coordinator.  He said \"We'll see about that\" Asked member to hang up because he had accused me of hanging up on him and I didn't want that to happen again.  He would not hang up.  Care coordinator did then hang  After saying good bye.      Lupe Hyman RN  [facility]   [phone]  "

## 2020-04-27 ENCOUNTER — VIRTUAL VISIT (OUTPATIENT)
Dept: ORTHOPEDICS | Facility: CLINIC | Age: 80
End: 2020-04-27
Payer: COMMERCIAL

## 2020-04-27 DIAGNOSIS — M19.011 PRIMARY OSTEOARTHRITIS OF RIGHT SHOULDER: Primary | ICD-10-CM

## 2020-04-27 PROCEDURE — 99214 OFFICE O/P EST MOD 30 MIN: CPT | Mod: 95 | Performed by: ORTHOPAEDIC SURGERY
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
## 2020-05-01 DIAGNOSIS — Z11.59 ENCOUNTER FOR SCREENING FOR OTHER VIRAL DISEASES: Primary | ICD-10-CM

## 2020-05-04 ENCOUNTER — TELEPHONE (OUTPATIENT)
Dept: OPHTHALMOLOGY | Facility: CLINIC | Age: 80
End: 2020-05-04

## 2020-05-04 NOTE — TELEPHONE ENCOUNTER
Called to cancel surgery and will call back to reschedule once COVID19 is clear. Left voicemail with callback #[phone].

## 2020-05-14 ENCOUNTER — TELEPHONE (OUTPATIENT)
Dept: OPHTHALMOLOGY | Facility: CLINIC | Age: 80
End: 2020-05-14

## 2020-05-14 ENCOUNTER — VIRTUAL VISIT (OUTPATIENT)
Dept: FAMILY MEDICINE | Facility: CLINIC | Age: 80
End: 2020-05-14
Payer: COMMERCIAL

## 2020-05-14 DIAGNOSIS — J44.1 COPD EXACERBATION (H): Primary | ICD-10-CM

## 2020-05-14 PROCEDURE — 99214 OFFICE O/P EST MOD 30 MIN: CPT | Mod: 95 | Performed by: INTERNAL MEDICINE

## 2020-05-14 RX ORDER — PREDNISONE 20 MG/1
20 TABLET ORAL DAILY
Qty: 5 TABLET | Refills: 0 | Status: SHIPPED | OUTPATIENT
Start: 2020-05-14 | End: 2020-07-15

## 2020-05-14 RX ORDER — DOXYCYCLINE 100 MG/1
100 CAPSULE ORAL 2 TIMES DAILY
Qty: 20 CAPSULE | Refills: 0 | Status: SHIPPED | OUTPATIENT
Start: 2020-05-14 | End: 2020-07-15

## 2020-05-14 NOTE — TELEPHONE ENCOUNTER
Returned Bhaskar's call to confirm his surgery is canceled for tomorrow 5/15. I left a voicemail letting him know once we get him rescheduled he can expect to be contacted by the central scheduling office to schedule COVID testing three days prior to their surgery/procedure date. I also let him know I should be reaching out in the next week or 2 to get him rescheduled. I left my phone number in case he has questions [phone].
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
## 2020-05-18 ENCOUNTER — TELEPHONE (OUTPATIENT)
Dept: OPHTHALMOLOGY | Facility: CLINIC | Age: 80
End: 2020-05-18

## 2020-05-18 NOTE — TELEPHONE ENCOUNTER
I called patient to schedule surgery with Dr. Tata Chi, I left a voicemail with callback # [phone]

## 2020-06-22 DIAGNOSIS — G47.00 INSOMNIA, UNSPECIFIED TYPE: ICD-10-CM

## 2020-06-22 DIAGNOSIS — R06.00 DYSPNEA, UNSPECIFIED TYPE: ICD-10-CM

## 2020-06-24 RX ORDER — GABAPENTIN 100 MG/1
CAPSULE ORAL
Qty: 60 CAPSULE | Refills: 2 | Status: SHIPPED | OUTPATIENT
Start: 2020-06-24 | End: 2020-08-05

## 2020-06-24 RX ORDER — ALBUTEROL SULFATE 90 UG/1
AEROSOL, METERED RESPIRATORY (INHALATION)
Qty: 8.5 INHALER | Refills: 3 | Status: SHIPPED | OUTPATIENT
Start: 2020-06-24 | End: 2021-04-16

## 2020-06-24 NOTE — TELEPHONE ENCOUNTER
"Requested Prescriptions   Pending Prescriptions Disp Refills     albuterol (PROAIR HFA) 108 (90 Base) MCG/ACT inhaler 8.5 Inhaler 3       Asthma Maintenance Inhalers - Anticholinergics Passed - 6/22/2020  9:11 AM        Passed - Patient is age 12 years or older        Passed - Recent (12 mo) or future (30 days) visit within the authorizing provider's specialty     Patient has had an office visit with the authorizing provider or a provider within the authorizing providers department within the previous 12 mos or has a future within next 30 days. See \"Patient Info\" tab in inbasket, or \"Choose Columns\" in Meds & Orders section of the refill encounter.              Passed - Medication is active on med list       Short-Acting Beta Agonist Inhalers Protocol  Passed - 6/22/2020  9:11 AM        Passed - Patient is age 12 or older        Passed - Recent (12 mo) or future (30 days) visit within the authorizing provider's specialty     Patient has had an office visit with the authorizing provider or a provider within the authorizing providers department within the previous 12 mos or has a future within next 30 days. See \"Patient Info\" tab in inbasket, or \"Choose Columns\" in Meds & Orders section of the refill encounter.              Passed - Medication is active on med list           gabapentin (NEURONTIN) 100 MG capsule 60 capsule 2     Sig: TAKE 1-2 BY MOUTH AT BEDTIME AS NEEDED FOR SLEEP       There is no refill protocol information for this order          Routing refill request to provider for review/approval because:  Drug gabapentin not on the [facility] [place]     Lilibeth FLORESN,RN  [facility]        "

## 2020-06-25 ENCOUNTER — TELEPHONE (OUTPATIENT)
Dept: OPHTHALMOLOGY | Facility: CLINIC | Age: 80
End: 2020-06-25

## 2020-06-25 NOTE — TELEPHONE ENCOUNTER
Called patient to schedule procedure with Dr. Tata Chi., there was no answer.  Left message with my direct line [phone].

## 2020-06-25 NOTE — TELEPHONE ENCOUNTER
6/25/2020 9:20AM Patient states he is calling to schedule with Dr. Chi. [phone]

## 2020-06-26 NOTE — TELEPHONE ENCOUNTER
Spoke with patient to schedule Right Eye surgery with Dr. Tata Chi.    Surgery was scheduled on 7/24/2020 at [facility]  Patient will have H&P at [facility] on 7/22/2020.     Patient is aware they will receive a call to schedule a COVID-19 test before their procedure.   The test should be with-in 72hours of your procedure.     Post-Op visit was scheduled on 7/24/2020 and 8/10/2020.  Patient is aware a / is needed day of surgery.   Surgery packet was mailed 6/25, patient has my direct contact information for any further questions [phone].

## 2020-06-29 NOTE — TELEPHONE ENCOUNTER
FUTURE VISIT INFORMATION      SURGERY INFORMATION:    Date: 20    Location:  or    Surgeon:  Tata Chi MD     Anesthesia Type:  Combined MAC with Retrobulbar     Procedure: CATARACT REMOVAL WITH INTRAOCULAR LENS IMPLANT     RECORDS REQUESTED FROM:       [facility]    Pertinent Medical History: hypertension    Most recent EKG+ Tracin19- [facility]    Most recent Cardiac Stress Test: 10/30/17

## 2020-06-30 ENCOUNTER — VIRTUAL VISIT (OUTPATIENT)
Dept: URGENT CARE | Facility: CLINIC | Age: 80
End: 2020-06-30
Payer: COMMERCIAL

## 2020-06-30 ENCOUNTER — NURSE TRIAGE (OUTPATIENT)
Dept: NURSING | Facility: CLINIC | Age: 80
End: 2020-06-30

## 2020-06-30 DIAGNOSIS — R05.9 COUGH: Primary | ICD-10-CM

## 2020-06-30 PROCEDURE — 99202 OFFICE O/P NEW SF 15 MIN: CPT | Mod: 95 | Performed by: EMERGENCY MEDICINE

## 2020-06-30 NOTE — TELEPHONE ENCOUNTER
"Patient calls stating he would like to be tested for COVID19. He has had an occasional cough for the past 2 weeks with white sputum. He is also having shortness of breath with exercise that is not unusual for him. He states he had a virtual visit back in May and was put medication for his cough.   Per chart review, he received antibiotics and prednisone for exacerbation of COPD. He reports that his symptoms completely resolved after he took the medication and then cough returned about 2 weeks ago. However, cough is occasional. He also states he attended a "[employer] establishment recently.     Transferred to romeo Barakat RN      Reason for Disposition    MILD difficulty breathing (e.g., minimal/no SOB at rest, SOB with walking, pulse <100)    Answer Assessment - Initial Assessment Questions  1. COVID-19 DIAGNOSIS: \"Who made your Coronavirus (COVID-19) diagnosis?\" \"Was it confirmed by a positive lab test?\" If not diagnosed by a HCP, ask \"Are there lots of cases (community spread) where you live?\" (See public health department website, if unsure)        2. ONSET: \"When did the COVID-19 symptoms start?\"       Approximately 2 weeks ago  3. WORST SYMPTOM: \"What is your worst symptom?\" (e.g., cough, fever, shortness of breath, muscle aches)      Productive cough  4. COUGH: \"Do you have a cough?\" If so, ask: \"How bad is the cough?\"        yes  5. FEVER: \"Do you have a fever?\" If so, ask: \"What is your temperature, how was it measured, and when did it start?\"      no  6. RESPIRATORY STATUS: \"Describe your breathing?\" (e.g., shortness of breath, wheezing, unable to speak)       Shortness of breath with exercise   7. BETTER-SAME-WORSE: \"Are you getting better, staying the same or getting worse compared to yesterday?\"  If getting worse, ask, \"In what way?\"      Same   8. HIGH RISK DISEASE: \"Do you have any chronic medical problems?\" (e.g., asthma, heart or lung disease, weak immune system, etc.)      Copd   9. " "PREGNANCY: \"Is there any chance you are pregnant?\" \"When was your last menstrual period?\"      no  10. OTHER SYMPTOMS: \"Do you have any other symptoms?\"  (e.g., chills, fatigue, headache, loss of smell or taste, muscle pain, sore throat)        Headaches occasionally    Protocols used: CORONAVIRUS (COVID-19) DIAGNOSED OR RIPUTCOLW-C-LB 5.16.20      "

## 2020-06-30 NOTE — PROGRESS NOTES
History: Patient is a 79 year old main with a cough for 2 weeks. No fever or SOB. Due to have surgery so needs to know covid status. History of COPD. Feels  cough is minor.    PE: NAD on phone. Non-dyspneic sounding    \  IMP: NAD. Cough for 2 weeks, will test.    PLAN: Covid PCR ordered. Testing team to follow.    TIME: 10 min

## 2020-07-02 DIAGNOSIS — Z11.59 ENCOUNTER FOR SCREENING FOR OTHER VIRAL DISEASES: Primary | ICD-10-CM

## 2020-07-06 ENCOUNTER — TELEPHONE (OUTPATIENT)
Dept: FAMILY MEDICINE | Facility: CLINIC | Age: 80
End: 2020-07-06

## 2020-07-06 NOTE — TELEPHONE ENCOUNTER
"I see patient has future order for covid testing for \"suspected covid\".   Virtual visit with Dr. Almonte 5/14/20.    Last had lipid panel 1/21/19.   \"stable\".    I called patient, he was reading the drug insert.   Says he's been on the pravastatin for at least a couple years.    He is worried about the statement that it can cause muscle breakdown and can affect kidney function.       He denies muscle aches but feels like his body is getting smaller/muscles atrophying.    He is due for routine visit.   Scheduled physical for 7/15, will come in fasting.   Has cataract surgery the next week.    Idania Ansari RN  [facility]      "

## 2020-07-06 NOTE — TELEPHONE ENCOUNTER
Reason for Call:  Other call back    Detailed comments: PT requesting a callback from Dr. Alberts to discuss pravastatin (PRAVACHOL) 40 MG tabletTAKE 1 TABLET DAILY and results of medication.      Phone Number Patient can be reached at: Home number on file [phone] (home)    Best Time: Anytime    Can we leave a detailed message on this number? YES    Call taken on 7/6/2020 at 2:55 PM by Mamie Haskins

## 2020-07-10 ENCOUNTER — TELEPHONE (OUTPATIENT)
Dept: FAMILY MEDICINE | Facility: CLINIC | Age: 80
End: 2020-07-10

## 2020-07-14 DIAGNOSIS — I10 BENIGN ESSENTIAL HYPERTENSION: ICD-10-CM

## 2020-07-15 ENCOUNTER — ANCILLARY PROCEDURE (OUTPATIENT)
Dept: GENERAL RADIOLOGY | Facility: CLINIC | Age: 80
End: 2020-07-15
Attending: FAMILY MEDICINE
Payer: COMMERCIAL

## 2020-07-15 ENCOUNTER — OFFICE VISIT (OUTPATIENT)
Dept: FAMILY MEDICINE | Facility: CLINIC | Age: 80
End: 2020-07-15
Payer: COMMERCIAL

## 2020-07-15 ENCOUNTER — TELEPHONE (OUTPATIENT)
Dept: FAMILY MEDICINE | Facility: CLINIC | Age: 80
End: 2020-07-15

## 2020-07-15 VITALS
OXYGEN SATURATION: 100 % | HEART RATE: 73 BPM | SYSTOLIC BLOOD PRESSURE: 133 MMHG | HEIGHT: 63 IN | DIASTOLIC BLOOD PRESSURE: 79 MMHG | BODY MASS INDEX: 21.26 KG/M2 | WEIGHT: 120 LBS | TEMPERATURE: 97.8 F

## 2020-07-15 DIAGNOSIS — M25.511 RIGHT SHOULDER PAIN, UNSPECIFIED CHRONICITY: ICD-10-CM

## 2020-07-15 DIAGNOSIS — Z00.00 ENCOUNTER FOR MEDICARE ANNUAL WELLNESS EXAM: Primary | ICD-10-CM

## 2020-07-15 DIAGNOSIS — N32.81 OVERACTIVE BLADDER: ICD-10-CM

## 2020-07-15 DIAGNOSIS — M79.671 PAIN IN BOTH FEET: Primary | ICD-10-CM

## 2020-07-15 DIAGNOSIS — N40.1 BENIGN NON-NODULAR PROSTATIC HYPERPLASIA WITH LOWER URINARY TRACT SYMPTOMS: ICD-10-CM

## 2020-07-15 DIAGNOSIS — R53.83 FATIGUE, UNSPECIFIED TYPE: ICD-10-CM

## 2020-07-15 DIAGNOSIS — Z12.5 SCREENING FOR PROSTATE CANCER: ICD-10-CM

## 2020-07-15 DIAGNOSIS — M79.672 PAIN IN BOTH FEET: Primary | ICD-10-CM

## 2020-07-15 DIAGNOSIS — M19.011 PRIMARY OSTEOARTHRITIS OF RIGHT SHOULDER: ICD-10-CM

## 2020-07-15 DIAGNOSIS — E78.5 HYPERLIPIDEMIA LDL GOAL <100: ICD-10-CM

## 2020-07-15 DIAGNOSIS — R63.4 WEIGHT LOSS: ICD-10-CM

## 2020-07-15 DIAGNOSIS — Z72.0 TOBACCO ABUSE: ICD-10-CM

## 2020-07-15 LAB
ALBUMIN SERPL-MCNC: 3.9 G/DL (ref 3.4–5)
ALP SERPL-CCNC: 76 U/L (ref 40–150)
ALT SERPL W P-5'-P-CCNC: 41 U/L (ref 0–70)
ANION GAP SERPL CALCULATED.3IONS-SCNC: 4 MMOL/L (ref 3–14)
AST SERPL W P-5'-P-CCNC: 28 U/L (ref 0–45)
BASOPHILS # BLD AUTO: 0 10E9/L (ref 0–0.2)
BASOPHILS NFR BLD AUTO: 0.3 %
BILIRUB SERPL-MCNC: 0.6 MG/DL (ref 0.2–1.3)
BUN SERPL-MCNC: 28 MG/DL (ref 7–30)
CALCIUM SERPL-MCNC: 9.1 MG/DL (ref 8.5–10.1)
CHLORIDE SERPL-SCNC: 104 MMOL/L (ref 94–109)
CHOLEST SERPL-MCNC: 185 MG/DL
CO2 SERPL-SCNC: 31 MMOL/L (ref 20–32)
CREAT SERPL-MCNC: 0.97 MG/DL (ref 0.66–1.25)
DIFFERENTIAL METHOD BLD: ABNORMAL
EOSINOPHIL # BLD AUTO: 0.1 10E9/L (ref 0–0.7)
EOSINOPHIL NFR BLD AUTO: 1.2 %
ERYTHROCYTE [DISTWIDTH] IN BLOOD BY AUTOMATED COUNT: 14.3 % (ref 10–15)
GFR SERPL CREATININE-BSD FRML MDRD: 74 ML/MIN/{1.73_M2}
GLUCOSE SERPL-MCNC: 86 MG/DL (ref 70–99)
HCT VFR BLD AUTO: 35.1 % (ref 40–53)
HDLC SERPL-MCNC: 97 MG/DL
HGB BLD-MCNC: 12 G/DL (ref 13.3–17.7)
LDLC SERPL CALC-MCNC: 74 MG/DL
LYMPHOCYTES # BLD AUTO: 1.1 10E9/L (ref 0.8–5.3)
LYMPHOCYTES NFR BLD AUTO: 14.9 %
MCH RBC QN AUTO: 31.1 PG (ref 26.5–33)
MCHC RBC AUTO-ENTMCNC: 34.2 G/DL (ref 31.5–36.5)
MCV RBC AUTO: 91 FL (ref 78–100)
MONOCYTES # BLD AUTO: 0.4 10E9/L (ref 0–1.3)
MONOCYTES NFR BLD AUTO: 5.2 %
NEUTROPHILS # BLD AUTO: 5.8 10E9/L (ref 1.6–8.3)
NEUTROPHILS NFR BLD AUTO: 78.4 %
NONHDLC SERPL-MCNC: 88 MG/DL
PLATELET # BLD AUTO: 272 10E9/L (ref 150–450)
POTASSIUM SERPL-SCNC: 3.9 MMOL/L (ref 3.4–5.3)
PROT SERPL-MCNC: 7.3 G/DL (ref 6.8–8.8)
RBC # BLD AUTO: 3.86 10E12/L (ref 4.4–5.9)
SODIUM SERPL-SCNC: 139 MMOL/L (ref 133–144)
TRIGL SERPL-MCNC: 69 MG/DL
TSH SERPL DL<=0.005 MIU/L-ACNC: 0.99 MU/L (ref 0.4–4)
WBC # BLD AUTO: 7.3 10E9/L (ref 4–11)

## 2020-07-15 PROCEDURE — 85025 COMPLETE CBC W/AUTO DIFF WBC: CPT | Performed by: FAMILY MEDICINE

## 2020-07-15 PROCEDURE — 80053 COMPREHEN METABOLIC PANEL: CPT | Performed by: FAMILY MEDICINE

## 2020-07-15 PROCEDURE — 99397 PER PM REEVAL EST PAT 65+ YR: CPT | Performed by: FAMILY MEDICINE

## 2020-07-15 PROCEDURE — 99213 OFFICE O/P EST LOW 20 MIN: CPT | Mod: 25 | Performed by: FAMILY MEDICINE

## 2020-07-15 PROCEDURE — 71046 X-RAY EXAM CHEST 2 VIEWS: CPT

## 2020-07-15 PROCEDURE — 36415 COLL VENOUS BLD VENIPUNCTURE: CPT | Performed by: FAMILY MEDICINE

## 2020-07-15 PROCEDURE — G0103 PSA SCREENING: HCPCS | Performed by: FAMILY MEDICINE

## 2020-07-15 PROCEDURE — 80061 LIPID PANEL: CPT | Performed by: FAMILY MEDICINE

## 2020-07-15 PROCEDURE — 84443 ASSAY THYROID STIM HORMONE: CPT | Performed by: FAMILY MEDICINE

## 2020-07-15 RX ORDER — POLYETHYLENE GLYCOL 3350 17 G
2 POWDER IN PACKET (EA) ORAL
Qty: 100 LOZENGE | Refills: 3 | Status: SHIPPED | OUTPATIENT
Start: 2020-07-15 | End: 2020-08-31

## 2020-07-15 RX ORDER — OXYBUTYNIN CHLORIDE 5 MG/1
TABLET ORAL
Qty: 270 TABLET | Refills: 1 | Status: SHIPPED | OUTPATIENT
Start: 2020-07-15 | End: 2020-08-05

## 2020-07-15 RX ORDER — TAMSULOSIN HYDROCHLORIDE 0.4 MG/1
CAPSULE ORAL
Qty: 180 CAPSULE | Refills: 1 | Status: SHIPPED | OUTPATIENT
Start: 2020-07-15 | End: 2020-08-05

## 2020-07-15 ASSESSMENT — PATIENT HEALTH QUESTIONNAIRE - PHQ9: SUM OF ALL RESPONSES TO PHQ QUESTIONS 1-9: 0

## 2020-07-15 ASSESSMENT — MIFFLIN-ST. JEOR: SCORE: 1154.45

## 2020-07-15 ASSESSMENT — PAIN SCALES - GENERAL: PAINLEVEL: NO PAIN (0)

## 2020-07-15 NOTE — TELEPHONE ENCOUNTER
Attempt # 1  Called patient at home number.[phone] (home)     Was call answered?  Yes, relayed message from PCP to patient, gave podiatry  number if does not hear from them by Friday am. Patient repeated number correctly. Patient verbalized understanding and agreement with plan and had no questions.             Gretchen Marroquin RN  [facility]

## 2020-07-15 NOTE — TELEPHONE ENCOUNTER
I put in referral for podiatry. He should get a call in next few days for that.    Dayday Alberts MD

## 2020-07-15 NOTE — TELEPHONE ENCOUNTER
Reason for call:  Patient reporting a symptom    Symptom or request: Toe nails falling off.    Duration (how long have symptoms been present): couple weeks     Have you been treated for this before? No    Additional comments: Patient forgot to mention this to pcp at phone appt today and its wondering what he could do.     Phone Number patient can be reached at:  Home number on file [phone] (home)    Best Time:  Any     Can we leave a detailed message on this number:  YES    Call taken on 7/15/2020 at 2:35 PM by Karen Watkins

## 2020-07-15 NOTE — PATIENT INSTRUCTIONS
Patient Education   Personalized Prevention Plan  You are due for the preventive services outlined below.  Your care team is available to assist you in scheduling these services.  If you have already completed any of these items, please share that information with your care team to update in your medical record.  Health Maintenance Due   Topic Date Due     COPD Action Plan  1940     Zoster (Shingles) Vaccine (2 of 3) 03/11/2017     Annual Wellness Visit  04/25/2017     URINE DRUG SCREEN  09/24/2019               Stop smoking    Use nicotine lozenge as needed    We will send you xray and lab reports    Return to clinic in a month to recheck weight    Call to schedule with shoulder specialist at [facility] ( if you don't hear by next )

## 2020-07-15 NOTE — LETTER
[facility]   [address] [place]  [address]  [phone]                                   July 17, 2020    Bhaskar Ott  [address]   [facility] [address]        Dear Bhaskar,    Your lab results are all okay/ stable.     Results for orders placed or performed in visit on 07/15/20   XR Chest 2 Views     Status: None    Narrative    CHEST TWO VIEWS July 15, 2020 9:53 AM     HISTORY: Weight loss.    COMPARISON: 6/23/2015.      Impression    IMPRESSION: No acute cardiopulmonary disease. Revision of spine fusion  changes involving the thoracolumbar spine since the prior study.    AHSOK CHANDRA MD   Results for orders placed or performed in visit on 07/15/20   TSH with free T4 reflex     Status: None   Result Value Ref Range    TSH 0.99 0.40 - 4.00 mU/L   Comprehensive metabolic panel     Status: None   Result Value Ref Range    Sodium 139 133 - 144 mmol/L    Potassium 3.9 3.4 - 5.3 mmol/L    Chloride 104 94 - 109 mmol/L    Carbon Dioxide 31 20 - 32 mmol/L    Anion Gap 4 3 - 14 mmol/L    Glucose 86 70 - 99 mg/dL    Urea Nitrogen 28 7 - 30 mg/dL    Creatinine 0.97 0.66 - 1.25 mg/dL    GFR Estimate 74 >60 mL/min/[1.73_m2]    GFR Estimate If Black 86 >60 mL/min/[1.73_m2]    Calcium 9.1 8.5 - 10.1 mg/dL    Bilirubin Total 0.6 0.2 - 1.3 mg/dL    Albumin 3.9 3.4 - 5.0 g/dL    Protein Total 7.3 6.8 - 8.8 g/dL    Alkaline Phosphatase 76 40 - 150 U/L    ALT 41 0 - 70 U/L    AST 28 0 - 45 U/L   Prostate spec antigen screen     Status: None   Result Value Ref Range    PSA 0.48 0 - 4 ug/L   Lipid panel reflex to direct LDL Fasting     Status: None   Result Value Ref Range    Cholesterol 185 <200 mg/dL    Triglycerides 69 <150 mg/dL    HDL Cholesterol 97 >39 mg/dL    LDL Cholesterol Calculated 74 <100 mg/dL    Non HDL Cholesterol 88 <130 mg/dL   CBC with platelets differential     Status: Abnormal   Result Value Ref Range    WBC 7.3 4.0 - 11.0 10e9/L    RBC Count 3.86 (L)  4.4 - 5.9 10e12/L    Hemoglobin 12.0 (L) 13.3 - 17.7 g/dL    Hematocrit 35.1 (L) 40.0 - 53.0 %    MCV 91 78 - 100 fl    MCH 31.1 26.5 - 33.0 pg    MCHC 34.2 31.5 - 36.5 g/dL    RDW 14.3 10.0 - 15.0 %    Platelet Count 272 150 - 450 10e9/L    % Neutrophils 78.4 %    % Lymphocytes 14.9 %    % Monocytes 5.2 %    % Eosinophils 1.2 %    % Basophils 0.3 %    Absolute Neutrophil 5.8 1.6 - 8.3 10e9/L    Absolute Lymphocytes 1.1 0.8 - 5.3 10e9/L    Absolute Monocytes 0.4 0.0 - 1.3 10e9/L    Absolute Eosinophils 0.1 0.0 - 0.7 10e9/L    Absolute Basophils 0.0 0.0 - 0.2 10e9/L    Diff Method Automated Method        If you have any questions please call the clinic at [phone]    Sincerely,    Dayday Alberts MD  [facility]

## 2020-07-16 ENCOUNTER — PRE VISIT (OUTPATIENT)
Dept: ORTHOPEDICS | Facility: CLINIC | Age: 80
End: 2020-07-16

## 2020-07-16 LAB — PSA SERPL-ACNC: 0.48 UG/L (ref 0–4)

## 2020-07-16 RX ORDER — AMLODIPINE BESYLATE 5 MG/1
TABLET ORAL
Qty: 90 TABLET | Refills: 1 | Status: SHIPPED | OUTPATIENT
Start: 2020-07-16 | End: 2020-11-03

## 2020-07-16 NOTE — TELEPHONE ENCOUNTER
DIAGNOSIS: Primary osteoarthritis of right shoulder/No imaging/Deal, Dayday SWANSON MD in [facility] FP/IM/PEDS /[facility]  IMAGING WITH [facility]    APPOINTMENT DATE: 08/03/2020   NOTES STATUS DETAILS   OFFICE NOTE from referring provider Internal 04/27/2020   OFFICE NOTE from other specialist Internal 04/27/2020   DISCHARGE SUMMARY from hospital N/A    DISCHARGE REPORT from the ER N/A    OPERATIVE REPORT Internal 08/27/2015   MEDICATION LIST Internal    EMG (for Spine) N/A    IMPLANT RECORD/STICKER N/A    LABS     CBC/DIFF Internal 07/15/2020   CULTURES N/A    INJECTIONS DONE IN RADIOLOGY In process 10/26/2015  INJECTION IN HIP   MRI Internal 01/19/2017  04/01/2015  10/14/2015  NOT RELATED TO SHOULDER   CT SCAN In process FROM [facility]  02/25/2020   XRAYS (IMAGES & REPORTS) In process FROM [facility]  11/14/2019   TUMOR     PATHOLOGY  Slides & report N/A      Action    Action Taken IMG REQUEST FROM [facility] 07/16/2020

## 2020-07-16 NOTE — TELEPHONE ENCOUNTER
Prescription approved per [facility] Refill Protocol.    SANDRA VillanuevaN, RN  [facility]

## 2020-07-22 ENCOUNTER — TELEPHONE (OUTPATIENT)
Dept: FAMILY MEDICINE | Facility: CLINIC | Age: 80
End: 2020-07-22

## 2020-07-22 ENCOUNTER — NURSE TRIAGE (OUTPATIENT)
Dept: NURSING | Facility: CLINIC | Age: 80
End: 2020-07-22

## 2020-07-22 ENCOUNTER — PRE VISIT (OUTPATIENT)
Dept: SURGERY | Facility: CLINIC | Age: 80
End: 2020-07-22

## 2020-07-22 DIAGNOSIS — H25.811 COMBINED FORMS OF AGE-RELATED CATARACT OF RIGHT EYE: Primary | ICD-10-CM

## 2020-07-22 RX ORDER — OFLOXACIN 3 MG/ML
SOLUTION/ DROPS OPHTHALMIC
Qty: 1 BOTTLE | Refills: 0 | Status: SHIPPED | OUTPATIENT
Start: 2020-07-22 | End: 2020-09-24

## 2020-07-22 RX ORDER — PREDNISOLONE ACETATE 10 MG/ML
SUSPENSION/ DROPS OPHTHALMIC
Qty: 1 BOTTLE | Refills: 1 | Status: SHIPPED | OUTPATIENT
Start: 2020-07-22 | End: 2020-09-24

## 2020-07-22 NOTE — TELEPHONE ENCOUNTER
See other TE for lab results.      Gretchen Marroquin RN  Triage Nurse  [facility]  Appointment line: [phone]  [facility], 24 hour nurse line, available by calling clinic at [phone] and following prompts.

## 2020-07-22 NOTE — TELEPHONE ENCOUNTER
Reason for Call:  Request for results:    Name of test or procedure: Labs    Date of test of procedure: 7/15    Location of the test or procedure: [facility]    OK to leave the result message on voice mail or with a family member? NO    Phone number Patient can be reached at:  Home number on file [phone] (home)    Additional comments: Patient called and stated he is having surgery on 7/24 and would like to receive labs results before then.    Call taken on 7/22/2020 at 3:18 PM by Cheryl Wade

## 2020-07-22 NOTE — TELEPHONE ENCOUNTER
Attempt # 1  Called patient at home number.  Was call answered?  No answer, left message to call nurse line at [phone]        Gretchen Marroquin RN  [facility]

## 2020-07-22 NOTE — TELEPHONE ENCOUNTER
Reason for Call:  Other - Patient Call Back    Detailed comments: Patient stated he had received a call from the clinic but was unsure why. He thinks it may be regarding his lab results from 7/15/20. He asked if the nurse could call him back to discuss further. He also wanted Dr. Alberts to know he is having surgery on 7/24/20    Phone Number Patient can be reached at: Home number on file [phone] (home)    Best Time: Anytime    Can we leave a detailed message on this number? YES    Call taken on 7/22/2020 at 1:59 PM by Vannesa Mercado

## 2020-07-23 ENCOUNTER — NURSE TRIAGE (OUTPATIENT)
Dept: NURSING | Facility: CLINIC | Age: 80
End: 2020-07-23

## 2020-07-23 DIAGNOSIS — Z11.59 ENCOUNTER FOR SCREENING FOR OTHER VIRAL DISEASES: ICD-10-CM

## 2020-07-23 LAB
LABORATORY COMMENT REPORT: NORMAL
SARS-COV-2 RNA SPEC QL NAA+PROBE: NEGATIVE
SARS-COV-2 RNA SPEC QL NAA+PROBE: NORMAL
SPECIMEN SOURCE: NORMAL
SPECIMEN SOURCE: NORMAL

## 2020-07-23 RX ORDER — ALBUTEROL SULFATE 0.83 MG/ML
2.5 SOLUTION RESPIRATORY (INHALATION) EVERY 4 HOURS PRN
Status: CANCELLED | OUTPATIENT
Start: 2020-07-23

## 2020-07-23 RX ORDER — NALOXONE HYDROCHLORIDE 0.4 MG/ML
.1-.4 INJECTION, SOLUTION INTRAMUSCULAR; INTRAVENOUS; SUBCUTANEOUS
Status: CANCELLED | OUTPATIENT
Start: 2020-07-23 | End: 2020-07-24

## 2020-07-23 RX ORDER — HYDROMORPHONE HYDROCHLORIDE 1 MG/ML
.3-.5 INJECTION, SOLUTION INTRAMUSCULAR; INTRAVENOUS; SUBCUTANEOUS EVERY 10 MIN PRN
Status: CANCELLED | OUTPATIENT
Start: 2020-07-23

## 2020-07-23 RX ORDER — MEPERIDINE HYDROCHLORIDE 25 MG/ML
12.5 INJECTION INTRAMUSCULAR; INTRAVENOUS; SUBCUTANEOUS
Status: CANCELLED | OUTPATIENT
Start: 2020-07-23

## 2020-07-23 RX ORDER — OXYCODONE HYDROCHLORIDE 5 MG/1
5-10 TABLET ORAL EVERY 4 HOURS PRN
Status: CANCELLED | OUTPATIENT
Start: 2020-07-23

## 2020-07-23 RX ORDER — FENTANYL CITRATE 50 UG/ML
25-50 INJECTION, SOLUTION INTRAMUSCULAR; INTRAVENOUS
Status: CANCELLED | OUTPATIENT
Start: 2020-07-23

## 2020-07-23 RX ORDER — DEXAMETHASONE SODIUM PHOSPHATE 4 MG/ML
4 INJECTION, SOLUTION INTRA-ARTICULAR; INTRALESIONAL; INTRAMUSCULAR; INTRAVENOUS; SOFT TISSUE EVERY 10 MIN PRN
Status: CANCELLED | OUTPATIENT
Start: 2020-07-23

## 2020-07-23 RX ORDER — ONDANSETRON 2 MG/ML
4 INJECTION INTRAMUSCULAR; INTRAVENOUS EVERY 30 MIN PRN
Status: CANCELLED | OUTPATIENT
Start: 2020-07-23

## 2020-07-23 RX ORDER — SODIUM CHLORIDE, SODIUM LACTATE, POTASSIUM CHLORIDE, CALCIUM CHLORIDE 600; 310; 30; 20 MG/100ML; MG/100ML; MG/100ML; MG/100ML
INJECTION, SOLUTION INTRAVENOUS CONTINUOUS
Status: CANCELLED | OUTPATIENT
Start: 2020-07-23

## 2020-07-23 RX ORDER — ONDANSETRON 4 MG/1
4 TABLET, ORALLY DISINTEGRATING ORAL EVERY 30 MIN PRN
Status: CANCELLED | OUTPATIENT
Start: 2020-07-23

## 2020-07-23 NOTE — TELEPHONE ENCOUNTER
Patient states he is returning a call regarding his lab work. Triager advised caller to call back shortly when clinic opens up to discuss results with provider. Caller verbalized and understands directives.  COVID 19 Nurse Triage Plan/Patient Instructions    Please be aware that novel coronavirus (COVID-19) may be circulating in the community. If you develop symptoms such as fever, cough, or SOB or if you have concerns about the presence of another infection including coronavirus (COVID-19), please contact your health care provider or visit [email or web address].     Disposition/Instructions    Home care recommended. Follow home care protocol based instructions.    Thank you for taking steps to prevent the spread of this virus.  o Limit your contact with others.  o Wear a simple mask to cover your cough.  o Wash your hands well and often.    Resources    [facility]: About COVID-19: [email or web address]    CDC: What to Do If You're Sick: www.cdc.gov/coronavirus/2019-ncov/about/steps-when-sick.html    CDC: Ending Home Isolation: www.cdc.gov/coronavirus/2019-ncov/hcp/disposition-in-home-patients.html     CDC: Caring for Someone: www.cdc.gov/coronavirus/2019-ncov/if-you-are-sick/care-for-someone.html     [facility]: Interim Guidance for Hospital Discharge to Home: www.health.[facility].[place]/diseases/coronavirus/hcp/hospdischarge.pdf    [facility] clinical trials (COVID-19 research studies): clinicalaffairs.[facility]-clinical-trials     Below are the COVID-19 hotlines at the [facility] ([facility]). Interpreters are available.   o For health questions: Call 078-235-7152 or 1-864.269.3786 (7 a.m. to 7 p.m.)  o For questions about schools and childcare: Call 216-895-8963 or 1-480.434.9867 (7 a.m. to 7 p.m.)                     Reason for Disposition    [1] Follow-up call from patient regarding patient's clinical status AND [2] information urgent    Additional Information    Negative: [1] Caller  is not with the adult (patient) AND [2] reporting urgent symptoms    Lab result questions    Negative: Lab calling with strep throat test results and triager can call in prescription    Negative: Lab calling with urinalysis test results and triager can call in prescription    Negative: Medication questions    Negative: [1] Prescription not at pharmacy AND [2] was prescribed today by PCP    Negative: Lab or radiology calling with CRITICAL test results    Negative: Call about patient who is currently hospitalized    Negative: Physician call to PCP    Negative: ED call to PCP    Protocols used: PCP CALL - NO TRIAGE-A-AH, INFORMATION ONLY CALL-A-AH

## 2020-07-23 NOTE — TELEPHONE ENCOUNTER
Patient returning call about his labs and lab note relayed to patient. He will call clinic in the morning for additional questions.    Duyen Massey RN  [facility]    Additional Information    Health Information question, no triage required and triager able to answer question    Protocols used: INFORMATION ONLY CALL-A-AH

## 2020-07-23 NOTE — TELEPHONE ENCOUNTER
Attempt # 1  Called patient at home number.[phone] (home)  Was call answered?  No answer, left message to call nurse line at [phone]          Gretchen Marroquin RN  [facility]

## 2020-07-24 ENCOUNTER — HOSPITAL ENCOUNTER (OUTPATIENT)
Facility: AMBULATORY SURGERY CENTER | Age: 80
End: 2020-07-24
Attending: OPHTHALMOLOGY
Payer: COMMERCIAL

## 2020-07-24 VITALS
HEIGHT: 64 IN | BODY MASS INDEX: 21.34 KG/M2 | SYSTOLIC BLOOD PRESSURE: 133 MMHG | HEART RATE: 68 BPM | WEIGHT: 125 LBS | TEMPERATURE: 98 F | RESPIRATION RATE: 14 BRPM | DIASTOLIC BLOOD PRESSURE: 85 MMHG | OXYGEN SATURATION: 100 %

## 2020-07-24 DIAGNOSIS — H25.811 COMBINED FORMS OF AGE-RELATED CATARACT OF RIGHT EYE: Primary | ICD-10-CM

## 2020-07-24 RX ORDER — CYCLOPENTOLAT/TROPIC/PHENYLEPH 1%-1%-2.5%
1 DROPS (EA) OPHTHALMIC (EYE)
Status: COMPLETED | OUTPATIENT
Start: 2020-07-24 | End: 2020-07-24

## 2020-07-24 RX ORDER — LIDOCAINE 40 MG/G
CREAM TOPICAL
Status: DISCONTINUED | OUTPATIENT
Start: 2020-07-24 | End: 2020-07-25 | Stop reason: HOSPADM

## 2020-07-24 RX ORDER — SODIUM CHLORIDE 9 MG/ML
500 INJECTION, SOLUTION INTRAVENOUS CONTINUOUS
Status: DISCONTINUED | OUTPATIENT
Start: 2020-07-24 | End: 2020-07-25 | Stop reason: HOSPADM

## 2020-07-24 RX ORDER — OFLOXACIN 3 MG/ML
1 SOLUTION/ DROPS OPHTHALMIC
Status: COMPLETED | OUTPATIENT
Start: 2020-07-24 | End: 2020-07-24

## 2020-07-24 RX ORDER — PROPARACAINE HYDROCHLORIDE 5 MG/ML
1 SOLUTION/ DROPS OPHTHALMIC ONCE
Status: COMPLETED | OUTPATIENT
Start: 2020-07-24 | End: 2020-07-24

## 2020-07-24 RX ADMIN — SODIUM CHLORIDE 500 ML: 9 INJECTION, SOLUTION INTRAVENOUS at 10:10

## 2020-07-24 RX ADMIN — PROPARACAINE HYDROCHLORIDE 1 DROP: 5 SOLUTION/ DROPS OPHTHALMIC at 10:04

## 2020-07-24 RX ADMIN — Medication 1 DROP: at 10:04

## 2020-07-24 RX ADMIN — OFLOXACIN 1 DROP: 3 SOLUTION/ DROPS OPHTHALMIC at 10:09

## 2020-07-24 RX ADMIN — OFLOXACIN 1 DROP: 3 SOLUTION/ DROPS OPHTHALMIC at 10:17

## 2020-07-24 RX ADMIN — Medication 1 DROP: at 10:09

## 2020-07-24 RX ADMIN — Medication 1 DROP: at 10:17

## 2020-07-24 RX ADMIN — OFLOXACIN 1 DROP: 3 SOLUTION/ DROPS OPHTHALMIC at 10:04

## 2020-07-24 ASSESSMENT — MIFFLIN-ST. JEOR: SCORE: 1193

## 2020-07-24 NOTE — PROGRESS NOTES
Patient was upset that we weren't proceeding with surgery. We were unable to confirm someone to stay with him for 24 hours, and he was very defensive about needing someone to stay with him after surgery. He decided he did not want to stay and wait for the procedure. PIV was removed. Patient was walked out to the back door. The option was given for us to call his ride, but he refused and states he has a phone and can call someone himself.

## 2020-07-24 NOTE — TELEPHONE ENCOUNTER
Attempt # 2  Called patient at home number.[phone] No answer, mail box is full, tried [phone]  Was call answered?  No answer, left message to call nurse line at [phone] patient having surgery today at  of  so they will be able to see results in Epic. No further action necessary.         Gretchen Marroquin RN  [facility]

## 2020-07-31 ENCOUNTER — TELEPHONE (OUTPATIENT)
Dept: FAMILY MEDICINE | Facility: CLINIC | Age: 80
End: 2020-07-31

## 2020-07-31 DIAGNOSIS — H25.811 COMBINED FORMS OF AGE-RELATED CATARACT OF RIGHT EYE: ICD-10-CM

## 2020-07-31 DIAGNOSIS — Z11.9 SCREENING EXAMINATION FOR INFECTIOUS DISEASE: Primary | ICD-10-CM

## 2020-07-31 RX ORDER — PREDNISOLONE ACETATE 10 MG/ML
SUSPENSION/ DROPS OPHTHALMIC
Qty: 1 BOTTLE | Refills: 1 | Status: CANCELLED | OUTPATIENT
Start: 2020-07-31

## 2020-07-31 NOTE — TELEPHONE ENCOUNTER
The prednisolone  Eye  Drops should be approved or not by the eye specialist    I put in order for covid test ; he should get a call in the next several days to get this scheduled    Please inform patient    Dayday Alberts MD

## 2020-07-31 NOTE — TELEPHONE ENCOUNTER
Patient requesting symptom free Covid testing.     Requested Prescriptions   Pending Prescriptions Disp Refills     prednisoLONE acetate (PRED FORTE) 1 % ophthalmic suspension 1 Bottle 1     Si drop in surgical eye as directed, 4x daily after surgery for 1 week, 3x daily for 1 week, 2x daily for 1 week, daily for 1 week, then stop       There is no refill protocol information for this order        Last Written Prescription Date:  2020 surgery   Last Fill Quantity: 1 bottle,  # refills: 1 different pharmacy   Last office visit: 7/15/2020 with prescribing provider:  [facility]   Future Office Visit:          Routing refill request to provider for review/approval because:  Drug not on the [facility] refill protocol       Gretchen Marroquin RN  Triage Nurse  [facility]  Appointment line: [phone]  [facility], 24 hour nurse line, available by calling clinic at [phone] and following prompts.

## 2020-08-03 ENCOUNTER — TELEPHONE (OUTPATIENT)
Dept: FAMILY MEDICINE | Facility: CLINIC | Age: 80
End: 2020-08-03

## 2020-08-03 DIAGNOSIS — M25.511 RIGHT SHOULDER PAIN, UNSPECIFIED CHRONICITY: Primary | ICD-10-CM

## 2020-08-03 DIAGNOSIS — Z11.9 SCREENING EXAMINATION FOR INFECTIOUS DISEASE: Primary | ICD-10-CM

## 2020-08-03 NOTE — TELEPHONE ENCOUNTER
Reason for Call:  Other     Detailed comments:  Patient is wanting Dr Alberts to put in an order for a scooter. Patient has a bad right shoulder and was supposed to have surgery about 1 year ago but didn't have it. Patient stated his shoulder is deformed.    Phone Number Patient can be reached at: Home number on file [phone] (home)    Best Time: any    Can we leave a detailed message on this number? YES    Call taken on 8/3/2020 at 12:00 PM by Patricia Keller

## 2020-08-03 NOTE — TELEPHONE ENCOUNTER
Have patient see me in clinic in the next few weeks for face to face clinic appointment.  Need to have documentation before we can start process.    Please inform patient    Dayday Alberts MD

## 2020-08-03 NOTE — TELEPHONE ENCOUNTER
Attempt # 1  Called patient at home number.[phone] (home)     Was call answered?  Yes, wanting electric wheel chair. Beginning to have trouble walking, legs are weak. Due for surgery on shoulder.    Does not have a  worker.      Patient then asked about refills - nurse explained needs to call pharmacy for refills.  Patient verbalized understanding and agreement with plan and had no questions.         Routing to PCP to review and advise.          Gretchen Marroquin RN  [facility]

## 2020-08-03 NOTE — TELEPHONE ENCOUNTER
Called patient and left a detailed message relying the providers message below.     Prerna Alfonso RN

## 2020-08-03 NOTE — TELEPHONE ENCOUNTER
Reason for call:  Patient reporting a symptom    Symptom or request:  Patient is having symptoms of covid. Patient is coughing, sneezing, runny nose. Going to bathroom frequently (bowel movements)    Duration (how long have symptoms been present):  Started this past weekend. Patient was tested for covid about on 7/23/20 and that was negative.     Have you been treated for this before? No    Additional comments:     Phone Number patient can be reached at:  Cell number on file:    Telephone Information:   Mobile [phone]       Best Time:  any    Can we leave a detailed message on this number:  YES    Call taken on 8/3/2020 at 11:55 AM by Patricia Keller

## 2020-08-04 NOTE — TELEPHONE ENCOUNTER
Call to patient, to inform that he would need a face to face visit with Dr. Alberts to assess his request for an electric wheel chair.     He told me he was in recently and Dr. Alberts knows about his difficulty with mobility.  He does not want to make an appointment at this time.     He also asked about where to get a covid 19 test done, as he left a message about this last week.     Forwarding to provider for review.     hCantel Dugan CMA

## 2020-08-04 NOTE — TELEPHONE ENCOUNTER
No rush on the face to face visit.    If he wants the covid swab test, I did put in an order.  He will get a call in the next week or so to schedule at one of the designated sites.    Please inform patient        Dayday Alberts MD

## 2020-08-05 ENCOUNTER — TELEPHONE (OUTPATIENT)
Dept: FAMILY MEDICINE | Facility: CLINIC | Age: 80
End: 2020-08-05

## 2020-08-05 DIAGNOSIS — E78.00 PURE HYPERCHOLESTEROLEMIA: ICD-10-CM

## 2020-08-05 DIAGNOSIS — N40.1 BENIGN NON-NODULAR PROSTATIC HYPERPLASIA WITH LOWER URINARY TRACT SYMPTOMS: ICD-10-CM

## 2020-08-05 DIAGNOSIS — K21.9 GASTROESOPHAGEAL REFLUX DISEASE WITHOUT ESOPHAGITIS: ICD-10-CM

## 2020-08-05 DIAGNOSIS — N32.81 OVERACTIVE BLADDER: ICD-10-CM

## 2020-08-05 DIAGNOSIS — G47.00 INSOMNIA, UNSPECIFIED TYPE: ICD-10-CM

## 2020-08-05 RX ORDER — OXYBUTYNIN CHLORIDE 5 MG/1
TABLET ORAL
Qty: 21 TABLET | Refills: 0 | Status: SHIPPED | OUTPATIENT
Start: 2020-08-05 | End: 2020-11-10

## 2020-08-05 RX ORDER — PRAVASTATIN SODIUM 40 MG
40 TABLET ORAL DAILY
Qty: 7 TABLET | Refills: 0 | Status: SHIPPED | OUTPATIENT
Start: 2020-08-05 | End: 2020-08-20

## 2020-08-05 NOTE — TELEPHONE ENCOUNTER
"Requested Prescriptions   Pending Prescriptions Disp Refills     pravastatin (PRAVACHOL) 40 MG tablet 7 tablet 0     Sig: Take 1 tablet (40 mg) by mouth daily       Statins Protocol Passed - 8/5/2020  4:30 PM        Passed - LDL on file in past 12 months     Recent Labs   Lab Test 07/15/20  0938   LDL 74             Passed - No abnormal creatine kinase in past 12 months     No lab results found.             Passed - Recent (12 mo) or future (30 days) visit within the authorizing provider's specialty     Patient has had an office visit with the authorizing provider or a provider within the authorizing providers department within the previous 12 mos or has a future within next 30 days. See \"Patient Info\" tab in inbasket, or \"Choose Columns\" in Meds & Orders section of the refill encounter.              Passed - Medication is active on med list        Passed - Patient is age 18 or older           oxybutynin (DITROPAN) 5 MG tablet 21 tablet 0     Sig: TAKE 1 TABLET THREE TIMES A DAY       Muscarinic Antagonists (Urinary Incontinence Agents) Passed - 8/5/2020  4:30 PM        Passed - Recent (12 mo) or future (30 days) visit within the authorizing provider's specialty     Patient has had an office visit with the authorizing provider or a provider within the authorizing providers department within the previous 12 mos or has a future within next 30 days. See \"Patient Info\" tab in inbasket, or \"Choose Columns\" in Meds & Orders section of the refill encounter.              Passed - Medication is Oxybutynin and patient is 5 years of age or older        Passed - Patient does not have a diagnosis of glaucoma on the problem list     If glaucoma diagnosis is new, refer refill to physician.          Passed - Medication is active on med list        Passed - Patient is 18 years of age or older           omeprazole (PRILOSEC) 20 MG DR capsule 14 capsule 0     Sig: Take 1 capsule (20 mg) by mouth 2 times daily       PPI Protocol Passed " "- 8/5/2020  4:30 PM        Passed - Not on Clopidogrel (unless Pantoprazole ordered)        Passed - No diagnosis of osteoporosis on record        Passed - Recent (12 mo) or future (30 days) visit within the authorizing provider's specialty     Patient has had an office visit with the authorizing provider or a provider within the authorizing providers department within the previous 12 mos or has a future within next 30 days. See \"Patient Info\" tab in inbasket, or \"Choose Columns\" in Meds & Orders section of the refill encounter.              Passed - Medication is active on med list        Passed - Patient is age 18 or older           gabapentin (NEURONTIN) 100 MG capsule 14 capsule 0     Sig: TAKE 1-2 BY MOUTH AT BEDTIME AS NEEDED FOR SLEEP       There is no refill protocol information for this order        tamsulosin (FLOMAX) 0.4 MG capsule 14 capsule 0     Sig: TAKE 1 CAPSULE TWICE A DAY       Alpha Blockers Failed - 8/5/2020  4:30 PM        Failed - Patient does not have Tadalafil, Vardenafil, or Sildenafil on their medication list        Passed - Blood pressure under 140/90 in past 12 months     BP Readings from Last 3 Encounters:   07/24/20 133/85   07/15/20 133/79   07/29/19 102/70                 Passed - Recent (12 mo) or future (30 days) visit within the authorizing provider's specialty     Patient has had an office visit with the authorizing provider or a provider within the authorizing providers department within the previous 12 mos or has a future within next 30 days. See \"Patient Info\" tab in inbasket, or \"Choose Columns\" in Meds & Orders section of the refill encounter.              Passed - Medication is active on med list        Passed - Patient is 18 years of age or older           Prescription approved per [facility] Refill Protocol. Patient requesting 7 day supply to hold over until [facility] sends out full refills.  Routing refill request to provider for review/approval because:  Failed protocols " - nurse changed quantities on all scripts for a 7 day supply with no refills and approved Omeprazole Pravastatin Oxybutynin         Gretchen Marroquin RN  Triage Nurse  [facility]  Appointment line: [phone]  [facility], 24 hour nurse line, available by calling clinic at [phone] and following prompts.

## 2020-08-05 NOTE — TELEPHONE ENCOUNTER
Reason for Call:  Medication or medication refill:    Do you use a [facility]?  Name of the pharmacy and phone number for the current request:  [facility] [id number] [place] [facility] [place], [place] - [facility] CJSTEPHANIE [phone] (Phone)  [phone] (Fax)       Name of the medication requested: gabapentin (NEURONTIN) 100 MG capsuleTAKE 1-2 BY MOUTH AT BEDTIME AS NEEDED FOR SLEEP   oxybutynin (DITROPAN) 5 MG tabletTAKE 1 TABLET THREE TIMES A DAY   omeprazole (PRILOSEC) 20 MG DR capsuleTAKE 1 CAPSULE TWICE A DAY   pravastatin (PRAVACHOL) 40 MG tabletTAKE 1 TABLET DAILY      tamsulosin (FLOMAX) 0.4 MG capsule      Other request: PT requesting a 7 day supply until [facility] can deliver the full refill    Can we leave a detailed message on this number? YES    Phone number patient can be reached at: Home number on file [phone] (home)    Best Time: Anytime    Call taken on 8/5/2020 at 4:15 PM by Mamie Haskins

## 2020-08-06 RX ORDER — TAMSULOSIN HYDROCHLORIDE 0.4 MG/1
CAPSULE ORAL
Qty: 14 CAPSULE | Refills: 0 | Status: SHIPPED | OUTPATIENT
Start: 2020-08-06 | End: 2020-11-10

## 2020-08-06 RX ORDER — GABAPENTIN 100 MG/1
CAPSULE ORAL
Qty: 14 CAPSULE | Refills: 0 | Status: SHIPPED | OUTPATIENT
Start: 2020-08-06 | End: 2020-08-20

## 2020-08-06 NOTE — TELEPHONE ENCOUNTER
Patient is returning the call he missed from the RN. He states he is waiting at the pharmacy so that he can speak with the pharmacy staff after he speaks with our nurse.    Mike Mcintosh

## 2020-08-06 NOTE — TELEPHONE ENCOUNTER
I have attempted to contact this patient by phone with the following results: no answer, left message to return call to RN triage line.    Prerna Alfonso RN

## 2020-08-06 NOTE — TELEPHONE ENCOUNTER
DIAGNOSIS: Pain in both feet /No imaging/Dayday Alberts MD in [facility] FP/IM/PEDS /[facility]  Toenails coming off   APPOINTMENT DATE: 8.21.20   NOTES STATUS DETAILS   OFFICE NOTE from referring provider Internal 7.15.20 Dr. Alberts,    OFFICE NOTE from other specialist N/A    DISCHARGE SUMMARY from hospital N/A    DISCHARGE REPORT from the ER N/A    OPERATIVE REPORT N/A    MEDICATION LIST Internal    EMG (for Spine) N/A    IMPLANT RECORD/STICKER N/A    LABS     CBC/DIFF Internal    CULTURES N/A    INJECTIONS DONE IN RADIOLOGY N/A    MRI N/A    CT SCAN N/A    XRAYS (IMAGES & REPORTS) Internal 4.9.18 foot   TUMOR     PATHOLOGY  Slides & report N/A

## 2020-08-06 NOTE — TELEPHONE ENCOUNTER
Called patient and he stated there is an issue with the start dates of the medications at the pharmacy. He asked that nurse call them.     Nurse sees 3 of the 5 prescriptions were approved per protocol by the RN and the other 2 needed to be sent to the provider for approval.     Called pharmacy and the pharmacist stated it was a glitch through [facility] and they got the refills. Patient is at the pharmacy and they are speaking with him.    Prerna Alfonso RN

## 2020-08-06 NOTE — TELEPHONE ENCOUNTER
Pt would like a call back at [phone] to discuss various  pick-up dates on his Rx's..Ying Jerez,

## 2020-08-06 NOTE — TELEPHONE ENCOUNTER
DIAGNOSIS: Primary osteoarthritis of right shoulder/No imaging/[place], Dayday SWANSON MD in [facility] FP/IM/PEDS /[facility]   APPOINTMENT DATE: 8.24.20   NOTES STATUS DETAILS   OFFICE NOTE from referring provider Internal 7.15.20 Dr. Naylor,    OFFICE NOTE from other specialist Internal 4.27.20 Miki   DISCHARGE SUMMARY from hospital N/A    DISCHARGE REPORT from the ER N/A    OPERATIVE REPORT Internal 8.27.15   MEDICATION LIST Internal    EMG (for Spine) N/A    IMPLANT RECORD/STICKER N/A    LABS     CBC/DIFF Internal    CULTURES N/A    INJECTIONS DONE IN RADIOLOGY N/A    MRI N/A    CT SCAN In Process Right shoulder, [facility] 2.25.20   XRAYS (IMAGES & REPORTS) In process 2.28.19 R shoulder    [facility] right shoulder 11.14.19   TUMOR     PATHOLOGY  Slides & report N/A      Action 8.6.20 MJ   Action Taken Requested two images from [facility]     Records Requested  08/10/20    Facility  [facility]  Phone: [phone]   Outcome * 8/10/20 1:58 PM Called out to [facility] to have images pushed to [email or web address] [facility]. Trung Healy

## 2020-08-07 ENCOUNTER — TELEPHONE (OUTPATIENT)
Dept: FAMILY MEDICINE | Facility: CLINIC | Age: 80
End: 2020-08-07

## 2020-08-07 NOTE — TELEPHONE ENCOUNTER
Patient called in to say thank you for helping him get his prescriptions yesterday, but that he did wait for two hours to get them.     Thank you,  Lena Mcdonald  Patient Rep.  [facility]

## 2020-08-07 NOTE — TELEPHONE ENCOUNTER
Reason for Call:  Other call back    Detailed comments: Patient would like a callback to know Dr. Alberts's opinion on getting a scooter at this time.     Phone Number Patient can be reached at: Home number on file [phone] (home)    Best Time: anytime    Can we leave a detailed message on this number? YES    Call taken on 8/7/2020 at 8:57 AM by Lena Mcdonald

## 2020-08-07 NOTE — TELEPHONE ENCOUNTER
Attempt # 1  Called patient at home [phone] (home)  Was call answered?  Yes,   Negative Covid screening - scheduled face to face appointment 8/13    Do you have:     Fever >100.0 -NO   New cough -NO   Shortness of breath -NO   Chills -NO   New loss of taste or smell -NO   Generalized body aches -NO   New persistent headache -NO   New sore throat-NO   New rash-NO   Nausea, vomiting or diarrhea-NO      Within the past 3 weeks, have you been exposed to someone with a known positive COVID 19 test?-NO   Have you traveled anywhere? -NO               Gretchen Marroquin RN  [facility]

## 2020-08-10 DIAGNOSIS — Z11.9 SCREENING EXAMINATION FOR INFECTIOUS DISEASE: ICD-10-CM

## 2020-08-10 LAB
SARS-COV-2 RNA SPEC QL NAA+PROBE: NOT DETECTED
SPECIMEN SOURCE: NORMAL

## 2020-08-10 NOTE — LETTER
August 11, 2020      Bhaskar Ott  [address]   [place] [address]        Dear ,    We are writing to inform you of your Negative Covid test.       Resulted Orders   Asymptomatic COVID-19 Virus (Coronavirus) by PCR   Result Value Ref Range    COVID-19 Virus PCR to [facility] - Source Nasopharyngeal     COVID-19 Virus PCR to [facility] - Result Not Detected       Comment:      Collection of multiple specimens from the same patient may be necessary to   detect the virus. The possibility of a false negative should be considered if   the patient's recent exposure or clinical presentation suggests 2019 nCOV   infection and diagnostic tests for other causes of illness are negative.   Repeat testing may be considered in this setting.  Viral RNA was extracted via a validated method and subsequently underwent   single step reverse transcriptase-real time polymerase chain reaction using   primers to the CDC specified N1,N2 gene targets of CoV2 and human RNP as an   internal control.  A negative result does not rule out the presence of real-time PCR inhibitors   in the specimen or COVID-19 RNA in concentrations below the limit of detection   of the assay. The possibility of a false negative should be considered if the   patients recent exposure or clinical presentation suggests COVID-19.   Additional testing or repeat testing requires consultation with the   laborator  y.  Nasopharyngeal specimen is the preferred choice for swab-based SARS CoV2   testing. When collection of a nasopharyngeal swab is not possible the   following are acceptable alternatives:  an oropharyngeal (OP) specimen collected by a healthcare professional, or a   nasal mid-turbinate (NMT) swab collected by a healthcare professional or by   onsite self-collection (using a flocked tapered swab), or an anterior nares   specimen collected by a healthcare professional or by onsite self-collection   (using a round foam swab). ([facility]  for Disease Control)  Testing performed by [facility], Room 1-210, [address] [place] [address], [place] [address]. This test was developed and its   performance characteristics determined by the [facility]   [place]. It has not been cleared or approved by the FDA.  The laboratory is regulated under the Clinical Laboratory Improvement   Amendments of 1988 (CLIA-88) as qualified to perform high-complexity testin  g.   This test is used for clinical purposes. It should not be regarded as   investigational or for research.         If you have any questions or concerns, please call the clinic at the number listed above.       Sincerely,        Dayday Alberts MD/fatimah

## 2020-08-13 ENCOUNTER — OFFICE VISIT (OUTPATIENT)
Dept: FAMILY MEDICINE | Facility: CLINIC | Age: 80
End: 2020-08-13
Payer: COMMERCIAL

## 2020-08-13 VITALS
TEMPERATURE: 98.6 F | SYSTOLIC BLOOD PRESSURE: 121 MMHG | HEART RATE: 73 BPM | BODY MASS INDEX: 20.66 KG/M2 | DIASTOLIC BLOOD PRESSURE: 70 MMHG | WEIGHT: 120.38 LBS

## 2020-08-13 DIAGNOSIS — M19.90 ARTHRITIS: ICD-10-CM

## 2020-08-13 DIAGNOSIS — R63.4 WEIGHT LOSS: ICD-10-CM

## 2020-08-13 DIAGNOSIS — M48.00 SPINAL STENOSIS, UNSPECIFIED SPINAL REGION: ICD-10-CM

## 2020-08-13 DIAGNOSIS — Z87.891 HISTORY OF SMOKING: ICD-10-CM

## 2020-08-13 DIAGNOSIS — R53.1 WEAKNESS: Primary | ICD-10-CM

## 2020-08-13 DIAGNOSIS — R53.83 FATIGUE, UNSPECIFIED TYPE: ICD-10-CM

## 2020-08-13 PROCEDURE — 99214 OFFICE O/P EST MOD 30 MIN: CPT | Performed by: FAMILY MEDICINE

## 2020-08-13 ASSESSMENT — PAIN SCALES - GENERAL: PAINLEVEL: NO PAIN (0)

## 2020-08-13 NOTE — PROGRESS NOTES
Subjective     Bhaskar Ott is a 79 year old male who presents to clinic today for the following health issues:    HPI       Discuss forms for a scooter            Reviewed and updated as needed this visit by Provider         Review of Systems    eating normally    Wt  Down    Feels like muscles getting smaller    Patient planning on quitting smoking today    Has 2 cigarettes left    Has been cutting down over last two months    One pack per week now    Of note, in 2017 we had ordered colonoscopy but patient didn't follow through    Walker for 2 years. That worked well initially but now now.          Objective    /70 (BP Location: Left arm, Patient Position: Chair, Cuff Size: Adult Regular)   Pulse 73   Temp 98.6  F (37  C) (Oral)   Wt 54.6 kg (120 lb 6 oz)   BMI 20.66 kg/m    Body mass index is 20.66 kg/m .  Physical Exam  Constitutional:       Appearance: He is well-developed.   HENT:      Head: Normocephalic and atraumatic.   Eyes:      Conjunctiva/sclera: Conjunctivae normal.   Neck:      Vascular: No carotid bruit.   Cardiovascular:      Rate and Rhythm: Normal rate and regular rhythm.      Heart sounds: Normal heart sounds.   Pulmonary:      Effort: Pulmonary effort is normal. No respiratory distress.      Breath sounds: Normal breath sounds.   Neurological:      Mental Status: He is alert and oriented to person, place, and time.      Cranial Nerves: No cranial nerve deficit.   Psychiatric:         Speech: Speech normal.         Behavior: Behavior normal.      mild edema bilat  1+ pretibial edema       Diagnostic Test Results:  Labs reviewed in Epic           ASSESSMENT / PLAN:  (R53.1) Weakness  (primary encounter diagnosis)  Comment: this visit is an in clinic face to face visit.  He would  Benefit from a motorized scooter.  Has  Generalized weakness which is getting worse.  It is getting harder for him to get around with just  The walker.  The scooter would improve functionality and help him  maintain independence.   Plan: order for DME             (M19.90) Arthritis  Comment: as above.  He does have generalized pain from arthritis  Plan: order for DME             (M48.00) Spinal stenosis, unspecified spinal region  Comment: history of spinal  Stenosis causes stiffness and contributes to weakness  Plan: order for DME             (R63.4) Weight loss  Comment: prudent to do scan given wt loss  Plan: CT Chest Abdomen Pelvis w/o & w Contrast             (R53.83) Fatigue, unspecified type  Comment: also reviewed recent labs   Plan: CT Chest Abdomen Pelvis w/o & w Contrast             (Z87.891) History of smoking  Comment: strongly advised complete cessation ; patient seems motivated    Plan: CT Chest Abdomen Pelvis w/o & w Contrast               I reviewed the patient's medications, allergies, medical history, family history, and social history.    Dayday Alberts MD

## 2020-08-13 NOTE — PATIENT INSTRUCTIONS
Take  Prescription for loco to medical supply store    Stay on same meds for now    Quit smoking    CT scan

## 2020-08-20 ENCOUNTER — ANCILLARY PROCEDURE (OUTPATIENT)
Dept: CT IMAGING | Facility: CLINIC | Age: 80
End: 2020-08-20
Attending: FAMILY MEDICINE
Payer: COMMERCIAL

## 2020-08-20 DIAGNOSIS — R53.83 FATIGUE, UNSPECIFIED TYPE: ICD-10-CM

## 2020-08-20 DIAGNOSIS — E78.00 PURE HYPERCHOLESTEROLEMIA: ICD-10-CM

## 2020-08-20 DIAGNOSIS — Z87.891 HISTORY OF SMOKING: ICD-10-CM

## 2020-08-20 DIAGNOSIS — G47.00 INSOMNIA, UNSPECIFIED TYPE: ICD-10-CM

## 2020-08-20 DIAGNOSIS — R63.4 WEIGHT LOSS: ICD-10-CM

## 2020-08-20 LAB
CREAT BLD-MCNC: 1.1 MG/DL (ref 0.5–1.2)
GFR SERPL CREATININE-BSD FRML MDRD: 74 ML/MIN/{1.73_M2}
GFRB: 78

## 2020-08-20 PROCEDURE — 74177 CT ABD & PELVIS W/CONTRAST: CPT | Mod: TC

## 2020-08-20 PROCEDURE — 71260 CT THORAX DX C+: CPT | Mod: TC

## 2020-08-20 RX ORDER — IOPAMIDOL 755 MG/ML
58 INJECTION, SOLUTION INTRAVASCULAR ONCE
Status: COMPLETED | OUTPATIENT
Start: 2020-08-20 | End: 2020-08-20

## 2020-08-20 RX ORDER — GABAPENTIN 100 MG/1
CAPSULE ORAL
Qty: 180 CAPSULE | Refills: 0 | Status: SHIPPED | OUTPATIENT
Start: 2020-08-20 | End: 2020-11-10

## 2020-08-20 RX ORDER — PRAVASTATIN SODIUM 40 MG
40 TABLET ORAL DAILY
Qty: 90 TABLET | Refills: 0 | Status: SHIPPED | OUTPATIENT
Start: 2020-08-20 | End: 2020-08-31

## 2020-08-20 RX ADMIN — IOPAMIDOL 58 ML: 755 INJECTION, SOLUTION INTRAVASCULAR at 11:34

## 2020-08-20 NOTE — TELEPHONE ENCOUNTER
Routing refill request to provider for review/approval because:  Drug not on the [facility] refill protocol   finasteride should have refills remaining at requested pharmacy.     Prerna Alfonso RN

## 2020-08-20 NOTE — TELEPHONE ENCOUNTER
Reason for Call:  Other / Pravastatin med    Detailed comments: Patient called and stated he is completely out of PRAVASTATIN (PRAVACHOL) and therefore would appreciate the refill for that one medication sent to [facility] this time.    Phone Number Patient can be reached at: Home number on file [phone] (home) / [phone] ([facility])    Best Time: ASAP    Can we leave a detailed message on this number? YES    Call taken on 8/20/2020 at 4:33 PM by Cheryl Wade

## 2020-08-20 NOTE — LETTER
[facility]   [address] [place]  [address]  [phone]                                   August 24, 2020    Bhaskar Ott  [address]   [place] [address]        Dear Bhaskar,    I tried to call you with this CT scan result.  We had a bad phone connection.     Nothing real worrisome on the scan.     There is a very small indeterminate nodule in the lung.  Usually theses are scars from a past infection.  Not suspicious for cancer, but we could repeat the CT in about a year to follow up.     There is a small kidney stone up in the kidney but it is not causing any problems so no need to treat this.     The prostate is enlarged but we know from your normal PSA blood test recently that you do not have prostate cancer.     Results for orders placed or performed in visit on 08/20/20   CT Chest/Abdomen/Pelvis w Contrast     Status: None    Narrative    CT CHEST/ABDOMEN/PELVIS WITH CONTRAST August 20, 2020 11:37 AM    CLINICAL HISTORY: Weight loss. Fatigue, unspecified type. History of  smoking.    TECHNIQUE: CT scan of the chest, abdomen, and pelvis was performed  following injection of IV contrast. Multiplanar reformats were  obtained. Dose reduction techniques were used.   CONTRAST: 58 mL Isovue-370    COMPARISON: CT chest 12/8/2012.    FINDINGS:   LUNGS AND PLEURA: A new 0.4 cm medial right upper lobe lung nodule is  present on series 5, image 66. Elevation left hemidiaphragm. Lungs are  otherwise clear. Airways are patent. No pleural fluid.    MEDIASTINUM/AXILLAE: No enlarged lymph nodes. Thoracic aorta is  unremarkable without aneurysm or dissection. Scattered calcified  plaque is present. Central pulmonary arteries are patent. Esophagus is  unremarkable however is partially obscured by artifact related to  paraspinal rods.    HEPATOBILIARY: A few low-attenuation liver lesions are present most  likely cysts. Liver is otherwise unremarkable. Calcified  granulomas  are stable. Gallbladder is decompressed but otherwise unremarkable.    PANCREAS: Grossly unremarkable allowing for the artifact partially  obscuring the uncinate process and a portion of the pancreatic head.    SPLEEN: Low-attenuation splenic lesions are most consistent with  cysts. Spleen is normal in size.    ADRENAL GLANDS: Normal.    KIDNEYS/BLADDER: Multiple low-attenuation renal lesions are present  most consistent with cysts. No hydronephrosis. No obvious enhancing  renal mass. Nonobstructing left renal collecting system stones  measuring up to 0.4 cm noted in the left renal collecting system on  image 159 of series 3. 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 Nonobstructing stone is present on image  191. No definite ureteral or bladder calculi.    BOWEL: Colonic constipation but no definite evidence of bowel  obstruction.    LYMPH NODES: No enlarged abdominal or pelvic lymph nodes.    PELVIC ORGANS: Prostate gland is enlarged causing mass effect upon the  bladder base. Bladder and rectum are otherwise unremarkable.    ADDITIONAL FINDINGS: None.    MUSCULOSKELETAL: Degenerative spine changes and paraspinal rods with  thoracolumbar fusion is again noted. Degenerative right hip and right  shoulder joint changes are noted. A few scattered bone islands or  enchondromas are present.      Impression    IMPRESSION:  1.  Small 0.4 cm right upper lobe lung nodule is new since prior exam.  Lungs are otherwise clear. This is indeterminate.  2.  Liver and splenic cysts. Probable additional renal cysts some of  which are indeterminate due to the presence of artifact from the  paraspinal rods.  3.  Nonobstructing stone within each renal collecting system. No  hydronephrosis.  4.  Prostate gland enlargement causing mass effect upon the bladder  base.    SUDHAKAR GAITAN MD   Creatinine POCT     Status: None   Result Value Ref Range    Creatinine 1.1 0.5 - 1.2 mg/dl    GFR 74 >60    GFRB 78  >60       If you have any questions please call the clinic at [phone]    Sincerely,    Dayday Alberts MD   bmd

## 2020-08-20 NOTE — TELEPHONE ENCOUNTER
Reason for Call:  Other returning call and prescription    Detailed comments: Patient called and stated that he needs refills for:pravastatin (PRAVACHOL) 40 MG tablet, finasteride (PROSCAR) 5 MG tablet and gabapentin (NEURONTIN) 100 MG capsule and needs this as soon as possible. He would like these to be sent over to the [facility] - [address]     Phone Number Patient can be reached at: Cell number on file:    Telephone Information:   Mobile [phone]       Best Time: As soon as possible    Can we leave a detailed message on this number? YES    Call taken on 8/20/2020 at 4:19 PM by Ortiz Mehta

## 2020-08-21 ENCOUNTER — OFFICE VISIT (OUTPATIENT)
Dept: ORTHOPEDICS | Facility: CLINIC | Age: 80
End: 2020-08-21
Payer: COMMERCIAL

## 2020-08-21 ENCOUNTER — PRE VISIT (OUTPATIENT)
Dept: ORTHOPEDICS | Facility: CLINIC | Age: 80
End: 2020-08-21

## 2020-08-21 ENCOUNTER — ANCILLARY PROCEDURE (OUTPATIENT)
Dept: GENERAL RADIOLOGY | Facility: CLINIC | Age: 80
End: 2020-08-21
Attending: PODIATRIST
Payer: COMMERCIAL

## 2020-08-21 VITALS — HEIGHT: 63 IN | BODY MASS INDEX: 21.39 KG/M2 | WEIGHT: 120.7 LBS

## 2020-08-21 DIAGNOSIS — M79.672 PAIN IN BOTH FEET: ICD-10-CM

## 2020-08-21 DIAGNOSIS — M79.671 PAIN IN BOTH FEET: Primary | ICD-10-CM

## 2020-08-21 DIAGNOSIS — M54.17 LUMBOSACRAL RADICULOPATHY: ICD-10-CM

## 2020-08-21 DIAGNOSIS — M79.672 PAIN IN BOTH FEET: Primary | ICD-10-CM

## 2020-08-21 DIAGNOSIS — M79.671 PAIN IN BOTH FEET: ICD-10-CM

## 2020-08-21 ASSESSMENT — MIFFLIN-ST. JEOR: SCORE: 1154.36
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
## 2020-08-21 NOTE — NURSING NOTE
"Reason For Visit:   Chief Complaint   Patient presents with     Consult     Pain in both feet left foot of most concern/Deal, Dayday SWANSON MD in CP FP/IM/PEDS /[facility]/Orthocn        Ht 1.595 m (5' 2.8\")   Wt 54.7 kg (120 lb 11.2 oz)   BMI 21.52 kg/m      Pain Assessment  Patient Currently in Pain: Yes  0-10 Pain Scale: 7  Primary Pain Location: Foot    Amparo KarolinaoNIHARIKA  "

## 2020-08-22 ENCOUNTER — TELEPHONE (OUTPATIENT)
Dept: FAMILY MEDICINE | Facility: CLINIC | Age: 80
End: 2020-08-22

## 2020-08-22 NOTE — RESULT ENCOUNTER NOTE
I tried to call you with this CT scan result.  We had a bad phone connection.    Nothing real worrisome on the scan.    There is a very small indeterminate nodule in the lung.  Usually theses are scars from a past infection.  Not suspicious for cancer, but we could repeat the CT in about a year to follow up.    There is a small kidney stone up in the kidney but it is not causing any problems so no need to treat this.    The prostate is enlarged but we know from your normal PSA blood test recently that you do not have prostate cancer.    Dayday Alberts MD

## 2020-08-22 NOTE — TELEPHONE ENCOUNTER
I tried to call patient with CT result.  Very bad connection so hard to understand each other.  I told him I will send result note.  Dayday Alberts MD

## 2020-08-24 ENCOUNTER — OFFICE VISIT (OUTPATIENT)
Dept: ORTHOPEDICS | Facility: CLINIC | Age: 80
End: 2020-08-24
Payer: COMMERCIAL

## 2020-08-24 ENCOUNTER — ANCILLARY PROCEDURE (OUTPATIENT)
Dept: GENERAL RADIOLOGY | Facility: CLINIC | Age: 80
End: 2020-08-24
Attending: ORTHOPAEDIC SURGERY
Payer: COMMERCIAL

## 2020-08-24 ENCOUNTER — PRE VISIT (OUTPATIENT)
Dept: ORTHOPEDICS | Facility: CLINIC | Age: 80
End: 2020-08-24

## 2020-08-24 DIAGNOSIS — M19.011 PRIMARY OSTEOARTHRITIS OF RIGHT SHOULDER: Primary | ICD-10-CM

## 2020-08-24 DIAGNOSIS — M25.511 RIGHT SHOULDER PAIN, UNSPECIFIED CHRONICITY: ICD-10-CM

## 2020-08-24 NOTE — NURSING NOTE
Reason For Visit:   Chief Complaint   Patient presents with     Consult     Right shoulder osteoarthritis        PCP: Dayday Alberts    ? No  Occupation Not working  Date of injury: None  Date of surgery: None  Smoker: Yes, started up since the pandemic   Right  hand dominant    SANE score  Affected shoulder: Right   Right shoulder SANE: 0  Left shoulder SANE: 100    There were no vitals taken for this visit.      Pain Assessment  Patient Currently in Pain: Yes  0-10 Pain Scale: 10  Primary Pain Location: Shoulder  Pain Descriptors: Discomfort    Lisa Ellis LPN

## 2020-08-24 NOTE — NURSING NOTE
Teaching Flowsheet   Relevant Diagnosis: Shoulder arthritis  Teaching Topic: Pre-op for right reverse TSA - surgery coordinator will ball to schedule     Person(s) involved in teaching:   Patient     Motivation Level:  Asks Questions: Yes  Cooperative: Yes  Receptive (willing/able to accept information): Yes  Any cultural factors/Sikhism beliefs that may influence understanding or compliance? No     Patient demonstrates understanding of the following:  Reason for the appointment, diagnosis and treatment plan: Yes  Which situations necessitate calling provider and whom to contact: Yes     Teaching Concerns Addressed:   Patient lives alone in an apartment.  He states he is not in contact with any family members..  He has a care coordinator ([phone]) who can be contacted to help with home care plans after hospital discharge.  Patient states he depends on public transportation or medical rides for healthcare visits.  PAC will be scheduled for his pre-op H&P.  He states he is aware of post-op limitations - lifting restriction of 8-10 pounds, adjustment with using his walker.  He states he is in the process of getting a motorized wheelchair.     Proper use and care ofsling x 6 weeks (medical equip, care aids, etc.): Yes  Nutritional needs and diet plan: Yes  Pain management techniques: Yes  Wound Care: Yes  How and/when to access community resources: Yes     Instructional Materials Used/Given: Pre-op packet, surgical soap, written guidelines for care after shoulder replacement.
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
## 2020-08-25 ENCOUNTER — TELEPHONE (OUTPATIENT)
Dept: FAMILY MEDICINE | Facility: CLINIC | Age: 80
End: 2020-08-25

## 2020-08-25 ENCOUNTER — PREP FOR PROCEDURE (OUTPATIENT)
Dept: ORTHOPEDICS | Facility: CLINIC | Age: 80
End: 2020-08-25

## 2020-08-25 DIAGNOSIS — M19.011 PRIMARY OSTEOARTHRITIS OF RIGHT SHOULDER: Primary | ICD-10-CM

## 2020-08-25 NOTE — TELEPHONE ENCOUNTER
Requested information faxed to number provided.  [facility]  Team 3 Coordinator

## 2020-08-25 NOTE — TELEPHONE ENCOUNTER
Reason for Call: Request for an order or referral:    Order or referral being requested: electric scooter    Date needed: as soon as possible    Has the patient been seen by the PCP for this problem? YES    Additional comments: please fax order to [facility]: [phone]    Phone number Patient can be reached at:  Home number on file [phone] (home)    Best Time:  anytime    Can we leave a detailed message on this number?  YES    Call taken on 8/25/2020 at 10:09 AM by Mike Mcintosh

## 2020-08-25 NOTE — TELEPHONE ENCOUNTER
Routing to  to fax order - entered 8/13/2020      Gretchen Marroquin RN  Triage Nurse  [facility]  Appointment line: [phone]  [facility], 24 hour nurse line, available by calling clinic at [phone] and following prompts.

## 2020-08-28 ENCOUNTER — ANCILLARY PROCEDURE (OUTPATIENT)
Dept: CT IMAGING | Facility: CLINIC | Age: 80
End: 2020-08-28
Attending: ORTHOPAEDIC SURGERY
Payer: COMMERCIAL

## 2020-08-28 DIAGNOSIS — M19.011 PRIMARY OSTEOARTHRITIS OF RIGHT SHOULDER: ICD-10-CM

## 2020-08-31 ENCOUNTER — TELEPHONE (OUTPATIENT)
Dept: ORTHOPEDICS | Facility: CLINIC | Age: 80
End: 2020-08-31

## 2020-08-31 NOTE — TELEPHONE ENCOUNTER
Attempted to reach out to patient to discuss scheduling surgery with Dr. Mandujano as well as a PAC appointment. Left detailed message that first available for surgery would be 10/14/20. Left best call back number of [phone]

## 2020-09-01 NOTE — TELEPHONE ENCOUNTER
"Received voicemail from patient he was returning a call. Patient left best call back number of [phone]. When I attempt to call this number I just a message stating \" this number has calling restrictions please try a different number.\"   Called [phone] and left a detailed message informing patient of message on other phone as well as letting him know that first available surgery date would be 10/14/20. Left best call back number of [phone]  "

## 2020-09-02 ENCOUNTER — HOSPITAL ENCOUNTER (INPATIENT)
Facility: CLINIC | Age: 80
Setting detail: SURGERY ADMIT
End: 2020-09-02
Attending: ORTHOPAEDIC SURGERY | Admitting: ORTHOPAEDIC SURGERY
Payer: COMMERCIAL

## 2020-09-02 DIAGNOSIS — M19.011 PRIMARY OSTEOARTHRITIS OF RIGHT SHOULDER: ICD-10-CM

## 2020-09-02 DIAGNOSIS — Z11.59 ENCOUNTER FOR SCREENING FOR OTHER VIRAL DISEASES: Primary | ICD-10-CM

## 2020-09-02 NOTE — TELEPHONE ENCOUNTER
Patient is scheduled for surgery with Dr. Mandujano      Spoke or left message with: Santi with Bhaskar    Date of Surgery: 10/14/20    Location: [place]    Informed patient they will need an adult  Yes    Pre-op with surgeon (if applicable): n/a    H&P: Scheduled with PAC    Additional imaging/appointments: Patient will await call from covid scheduling team to schedule a covid test 4 days prior to surgery     Additional comments: Patient will receive arrival time at PAC appointment

## 2020-09-03 NOTE — TELEPHONE ENCOUNTER
FUTURE VISIT INFORMATION      SURGERY INFORMATION:    Date: 10/14/20    [facility]    Surgeon:  Edis Mandujano MD    Anesthesia Type:  Combined General with Block     Procedure: Reverse total shoulder arthroplasty     Consult: ov     RECORDS REQUESTED FROM:       Primary Care Provider: Dayday Alberts MD [facility]    Pertinent Medical History: hypertension    Most recent EKG+ Tracin19- [facility]    Most recent Cardiac Stress Test: 10/30/17

## 2020-09-04 ENCOUNTER — PATIENT OUTREACH (OUTPATIENT)
Dept: GERIATRIC MEDICINE | Facility: CLINIC | Age: 80
End: 2020-09-04

## 2020-09-04 NOTE — PROGRESS NOTES
[facility] Care Coordination Contact    9/3/2020 Rec'd vm from member requesting a return call.   9/4/2020 Attempted to reach member on home phone and cell, left vm on cell phone requesting a return call.   Shea Mo RN, BC  Manager [facility] Care Coordinator   [phone]  (Fax)

## 2020-09-14 ENCOUNTER — TELEPHONE (OUTPATIENT)
Dept: ORTHOPEDICS | Facility: CLINIC | Age: 80
End: 2020-09-14

## 2020-09-14 DIAGNOSIS — M19.012 PRIMARY OSTEOARTHRITIS OF LEFT SHOULDER: Primary | ICD-10-CM

## 2020-09-14 NOTE — TELEPHONE ENCOUNTER
Patient is scheduled for a reverse total shoulder arthroplasty 10/14/20.  Message was left for patient's care coordinator, Lupe ([phone]), to call the clinic to discuss the home situation and care needed after hospital discharge.

## 2020-09-14 NOTE — PROGRESS NOTES
[facility] Care Coordination Contact    Late entry, spoke with member on 9/8/20 to review his request for a change in CC. Member requests that this CC talk with his CC regarding his concerns and call him back. Member would not share his reasons for a request in changing his CC.   9/14/20 Call placed to member, explained that his CC is also on the phone with the goal to assist him in setting up services that he may need following his surgery on 10/18/20. Member requests to schedule another time as he is currently away from his home. Scheduled conference call for 9/16/20 @ 1 PM  Shea Mo RN, BC  Manager [facility] Care Coordinator   [phone]  (Fax)

## 2020-09-16 NOTE — PROGRESS NOTES
"[facility] Care Coordination Contact    Conference call placed to member with this CC and member's CC, Lupe. Explained that we are reaching out to with him to assist with future planning following his upcoming shoulder surgery on 10/14/20.    Member inquired if this CC recalls an earlier conversation that he shared with CC, \"I don't like to divulge personal information in my apartment.\"  Explained that CC does not recall any recent conversations with him regarding this.  Member has no thoughts at this time regarding the care he will need following surgery. Member states that he has not discussed his after care with his surgeon, but has a pre op scheduled in a couple of weeks. Member states that his preference is have therapy in his home.    Member gave verbal permission for Lupe to reach out to the surgical clinic to introduce herself and her role as CC.   Enc'd member to contact this CC or Lupe as needed.    CC reviewed previous notes, noted a conversation 8/2/2018, member shared that he does not want others to hear his conversation, \"the apt is not private, I can hear other people talking in the apartment next to me.\"   Call placed back to member, shared above information.  Offered a follow up telephone call when member is in place that he is comfortable to talk. Member agreed and will call CC to schedule a time.   Enc'd member to reach out to this CC and/or Lupe.  Member agreed.   Shea Mo RN, BC  Manager [facility] Care Coordinator   [phone]  (Fax)    "

## 2020-09-21 PROBLEM — Z79.891 OPIOID USE AGREEMENT EXISTS: Status: ACTIVE | Noted: 2018-01-19

## 2020-09-21 PROBLEM — R79.89 ELEVATED VITAMIN B12 LEVEL: Status: ACTIVE | Noted: 2020-03-10

## 2020-09-21 PROBLEM — M16.11 OSTEOARTHRITIS OF RIGHT HIP: Status: ACTIVE | Noted: 2020-09-21

## 2020-09-21 PROBLEM — R07.89 CHEST DISCOMFORT: Status: ACTIVE | Noted: 2020-09-21

## 2020-09-24 RX ORDER — MULTIPLE VITAMINS W/ MINERALS TAB 9MG-400MCG
1 TAB ORAL EVERY MORNING
COMMUNITY

## 2020-09-24 NOTE — PROGRESS NOTES
Preoperative Assessment Center Medication History Note    Medication history completed via phone call on September 24, 2020 by this writer. See Epic admission navigator for prior to admission medications. Operating room staff will still need to confirm medications and last dose information on day of surgery.     Medication history interview sources:  patient, payor information, care everywhere.     Changes made to PTA medication list (reason)  Added: multivitamin.   Deleted: idocaine, lidocaine w/ epi., ofloxacin, pred forte.   Changed: oxycodone sig,     Additional medication history information (including reliability of information, actions taken by pharmacist):    -- No recent (within 30 days) course of antibiotics  -- No recent (within 30 days) course of systemic steroids  -- Patient declines being on any other prescription or over-the-counter medications    Prior to Admission medications    Medication Sig Last Dose Taking? Auth Provider   albuterol (PROAIR HFA) 108 (90 Base) MCG/ACT inhaler INHALE 2 PUFFS INTO THE LUNGS EVERY 6 HOURS AS NEEDED FOR SHORTNESS OF BREATH / DYSPNEA OR WHEEZING Taking Yes Noam Almonte MD   amLODIPine (NORVASC) 5 MG tablet TAKE 1 TABLET DAILY Taking Yes Dayday Alberts MD   calcium carb 1250 mg, 500 mg [place],/vitamin D 200 units (OSCAL WITH D) 500-200 MG-UNIT per tablet Take 1 tablet by mouth daily Taking Yes Efren Dumont MD   cholecalciferol (VITAMIN D) 1000 UNIT tablet Take 1 tablet (1,000 Units) by mouth daily Taking Yes fEren Dumont MD   Cyanocobalamin (B-12) 1000 MCG TBCR Take 1,000 mcg by mouth daily Taking Yes Efren Dumont MD   ferrous gluconate 325 (36 FE) MG TABS Take 1 tablet by mouth 2 times daily Taking Yes Efren Dumont MD   finasteride (PROSCAR) 5 MG tablet Take 1 tablet (5 mg) by mouth daily Taking Yes Dayday Alberts MD   gabapentin (NEURONTIN) 100 MG capsule TAKE 1-2 BY MOUTH AT BEDTIME AS NEEDED FOR SLEEP Taking Yes Noam Almonte MD   lidocaine  (XYLOCAINE) 5 % external ointment One application 4 x daily to affected areas lower back and knees if necessary Taking Yes Dayday Alberts MD   Menthol 10 % AERO Externally apply 1 Dose topically 2 times daily as needed Taking Yes Dayday Alberts MD   Multiple Vitamin (MULTIVITAMIN  S) CAPS Take 1 tablet by mouth daily Taking Yes Efren Dumont MD   multivitamin w/minerals (THERA-VIT-M) tablet Take 1 tablet by mouth daily Taking Yes Unknown, Entered By History   nicotine (COMMIT) 2 MG lozenge PLACE 1 LOZENGE (2 MG) INSIDE CHEEK EVERY HOUR AS NEEDED FOR SMOKING CESSATION Taking Yes Dayday Alberts MD   omeprazole (PRILOSEC) 20 MG DR capsule Take 1 capsule (20 mg) by mouth 2 times daily Taking Yes Dayday Alberts MD   oxybutynin (DITROPAN) 5 MG tablet TAKE 1 TABLET THREE TIMES A DAY Taking Yes Dayday Alberts MD   oxyCODONE (ROXICODONE) 5 MG immediate release tablet Take 1 tablet (5 mg) by mouth every 12 hours as needed for moderate to severe pain  Patient taking differently: Take 5 mg by mouth 3 times daily as needed for moderate to severe pain  Taking Yes Remy Gonzalez MD   pravastatin (PRAVACHOL) 40 MG tablet Take 1 tablet (40 mg) by mouth daily Taking Yes Dayday Alberts MD   sildenafil (VIAGRA) 50 MG tablet 1/2 - 1 po one hour prior to anticipated intercourse Taking Yes Dayday Alberts MD   tamsulosin (FLOMAX) 0.4 MG capsule TAKE 1 CAPSULE TWICE A DAY Taking Yes Dayday Alberts MD   order for DME Equipment being ordered: motorized scooter   Dayday Alberts MD   order for DME Equipment being ordered: 4 wheel walker with brakes and cushioned seat   Dayday Alberts MD          Medication history completed by: Munir Parra, [facility]

## 2020-09-25 ENCOUNTER — OFFICE VISIT (OUTPATIENT)
Dept: SURGERY | Facility: CLINIC | Age: 80
End: 2020-09-25
Payer: COMMERCIAL

## 2020-09-25 ENCOUNTER — PRE VISIT (OUTPATIENT)
Dept: SURGERY | Facility: CLINIC | Age: 80
End: 2020-09-25

## 2020-09-25 ENCOUNTER — ANESTHESIA EVENT (OUTPATIENT)
Dept: SURGERY | Facility: CLINIC | Age: 80
End: 2020-09-25

## 2020-09-25 VITALS
HEART RATE: 70 BPM | OXYGEN SATURATION: 99 % | RESPIRATION RATE: 14 BRPM | TEMPERATURE: 97.9 F | HEIGHT: 63 IN | WEIGHT: 121 LBS | BODY MASS INDEX: 21.44 KG/M2 | SYSTOLIC BLOOD PRESSURE: 106 MMHG | DIASTOLIC BLOOD PRESSURE: 67 MMHG

## 2020-09-25 DIAGNOSIS — R06.09 DOE (DYSPNEA ON EXERTION): Primary | ICD-10-CM

## 2020-09-25 DIAGNOSIS — Z01.818 PREOP EXAMINATION: ICD-10-CM

## 2020-09-25 DIAGNOSIS — D64.9 ANEMIA, UNSPECIFIED TYPE: ICD-10-CM

## 2020-09-25 DIAGNOSIS — R79.89 ELEVATED VITAMIN B12 LEVEL: ICD-10-CM

## 2020-09-25 DIAGNOSIS — Z01.818 PREOP EXAMINATION: Primary | ICD-10-CM

## 2020-09-25 LAB
ALBUMIN SERPL-MCNC: 3.8 G/DL (ref 3.4–5)
ALP SERPL-CCNC: 79 U/L (ref 40–150)
ALT SERPL W P-5'-P-CCNC: 27 U/L (ref 0–70)
ANION GAP SERPL CALCULATED.3IONS-SCNC: 5 MMOL/L (ref 3–14)
AST SERPL W P-5'-P-CCNC: 18 U/L (ref 0–45)
BASOPHILS # BLD AUTO: 0 10E9/L (ref 0–0.2)
BASOPHILS NFR BLD AUTO: 0.5 %
BILIRUB DIRECT SERPL-MCNC: 0.2 MG/DL (ref 0–0.2)
BILIRUB SERPL-MCNC: 0.6 MG/DL (ref 0.2–1.3)
BUN SERPL-MCNC: 32 MG/DL (ref 7–30)
CALCIUM SERPL-MCNC: 9.2 MG/DL (ref 8.5–10.1)
CHLORIDE SERPL-SCNC: 106 MMOL/L (ref 94–109)
CO2 SERPL-SCNC: 27 MMOL/L (ref 20–32)
CREAT SERPL-MCNC: 1.57 MG/DL (ref 0.66–1.25)
DIFFERENTIAL METHOD BLD: ABNORMAL
EOSINOPHIL # BLD AUTO: 0.1 10E9/L (ref 0–0.7)
EOSINOPHIL NFR BLD AUTO: 1.4 %
ERYTHROCYTE [DISTWIDTH] IN BLOOD BY AUTOMATED COUNT: 12.5 % (ref 10–15)
GFR SERPL CREATININE-BSD FRML MDRD: 41 ML/MIN/{1.73_M2}
GLUCOSE SERPL-MCNC: 95 MG/DL (ref 70–99)
HCT VFR BLD AUTO: 36.6 % (ref 40–53)
HGB BLD-MCNC: 12.1 G/DL (ref 13.3–17.7)
IMM GRANULOCYTES # BLD: 0 10E9/L (ref 0–0.4)
IMM GRANULOCYTES NFR BLD: 0.5 %
LYMPHOCYTES # BLD AUTO: 1.3 10E9/L (ref 0.8–5.3)
LYMPHOCYTES NFR BLD AUTO: 16.1 %
MCH RBC QN AUTO: 30.8 PG (ref 26.5–33)
MCHC RBC AUTO-ENTMCNC: 33.1 G/DL (ref 31.5–36.5)
MCV RBC AUTO: 93 FL (ref 78–100)
MONOCYTES # BLD AUTO: 0.5 10E9/L (ref 0–1.3)
MONOCYTES NFR BLD AUTO: 5.7 %
NEUTROPHILS # BLD AUTO: 6 10E9/L (ref 1.6–8.3)
NEUTROPHILS NFR BLD AUTO: 75.8 %
NRBC # BLD AUTO: 0 10*3/UL
NRBC BLD AUTO-RTO: 0 /100
NT-PROBNP SERPL-MCNC: 620 PG/ML (ref 0–450)
PLATELET # BLD AUTO: 194 10E9/L (ref 150–450)
POTASSIUM SERPL-SCNC: 4 MMOL/L (ref 3.4–5.3)
PROT SERPL-MCNC: 7 G/DL (ref 6.8–8.8)
RBC # BLD AUTO: 3.93 10E12/L (ref 4.4–5.9)
RETICS # AUTO: 21.2 10E9/L (ref 25–95)
RETICS/RBC NFR AUTO: 0.5 % (ref 0.5–2)
SODIUM SERPL-SCNC: 138 MMOL/L (ref 133–144)
WBC # BLD AUTO: 7.9 10E9/L (ref 4–11)

## 2020-09-25 ASSESSMENT — LIFESTYLE VARIABLES: TOBACCO_USE: 1

## 2020-09-25 ASSESSMENT — MIFFLIN-ST. JEOR: SCORE: 1158.98

## 2020-09-25 ASSESSMENT — PAIN SCALES - GENERAL: PAINLEVEL: EXTREME PAIN (8)
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
## 2020-09-25 NOTE — PATIENT INSTRUCTIONS
Preparing for Your Surgery      Name:  Bhaskar Ott   MRN:  [id number]   :  1940   Today's Date:  2020       Arriving for surgery:  Surgery date:  2020  Arrival time:  8:40AM    Restrictions due to COVID 19:  Patients are allowed one visitor in the pre-op period  All visitors must wear a mask  No visitors under 18  No ill visitors   parking is not available     Please come to:     [facility] [address]  [address] [place] [address]. [place]  [address]    -Proceed to the 3rd floor, check in at the Adult Surgery Waiting Lounge. [phone]    If an escort is needed stop at the Information Desk in the lobby. Inform the information person that you are here for surgery. An escort to the Adult Surgery Waiting Lounge will be provided.        What can I eat or drink?  -  You may eat and drink normally for up to 8 hours before your surgery. (Until 10/14/2020, 3:10AM)  -  You may have clear liquids until 2 1/2 hours before surgery. (Until 10/14/2020, 8:40AM)  Examples of clear liquids:  Water  Clear broth  Juices (apple, white grape, white cranberry  and cider) without pulp  Noncarbonated, powder based beverages  (lemonade and Kvng-Aid)  Sodas (Sprite, 7-Up, ginger ale and seltzer)  Coffee or tea (without milk or cream)  Gatorade    -  No Alcohol for at least 24 hours before surgery     Which medicines can I take?    Hold Aspirin for 7 days before surgery.   Hold Multivitamins for 7 days before surgery.  Hold Supplements for 7 days before surgery.  Hold Ibuprofen (Advil, Motrin) for 1 day before surgery--unless otherwise directed by surgeon.  Hold Naproxen (Aleve) for 4 days before surgery.  Hold Sildenafil(Viagra) for 24 hours prior to surgery.    -  DO NOT take these medications the day of surgery:    Menthol   Nicotine Lozenge        -  PLEASE TAKE these medications the day of surgery:    Albuterol as needed and bring the day of  surgery    Amlodipine(Norvasc)   Finasteride(Proscar)    Lidocaine Ointment as needed-avoid use on surgical site the day of surgery    Omeprazole(Prilosec)  Oxycodone(Roxicodone) as needed    Oxybutynin(Ditropan)   Pravastatin(Pravachol)    Tamsulosin(Flomax)    How do I prepare myself?  - Please shower the evening before and the morning of surgery using Scrubcare or Hibiclens soap.    Use this soap only from the neck to your toes.     Leave the soap on your skin for one minute--then rinse thoroughly.      You may use your own shampoo and conditioner; no other hair products.   - Please remove all jewelry and body piercings.  - No lotions, deodorants or fragrance.  - Bring your ID and insurance card.    - All patients are required to have a Covid-19 test within 4 days of surgery/procedure.      -Patients will be contacted by the [facility] scheduling team within 1 week of surgery to make an appointment.      - Patients may call the Scheduling team at [phone] if they have not been scheduled within 4 days of  surgery.        Questions or Concerns:    - For any questions regarding the day of surgery or your hospital stay, please contact the Pre Admission Nursing Office at [phone].       - If you have health changes between today and your surgery please call your surgeon.       For questions after surgery please call your surgeons office.

## 2020-09-25 NOTE — LETTER
[facility]   [address] [place]  [address]  [phone]                                   October 1, 2020    Bhaskar Ott  [address]   [facility] [address]        Dear Bhaskar,    Here are some labs.  Apparently I was listed as the ordering physician.  Appears they are preop labs.     Results for orders placed or performed in visit on 09/25/20   Basic metabolic panel     Status: Abnormal   Result Value Ref Range    Sodium 138 133 - 144 mmol/L    Potassium 4.0 3.4 - 5.3 mmol/L    Chloride 106 94 - 109 mmol/L    Carbon Dioxide 27 20 - 32 mmol/L    Anion Gap 5 3 - 14 mmol/L    Glucose 95 70 - 99 mg/dL    Urea Nitrogen 32 (H) 7 - 30 mg/dL    Creatinine 1.57 (H) 0.66 - 1.25 mg/dL    GFR Estimate 41 (L) >60 mL/min/[1.73_m2]    GFR Estimate If Black 48 (L) >60 mL/min/[1.73_m2]    Calcium 9.2 8.5 - 10.1 mg/dL   Reticulocyte Count     Status: Abnormal   Result Value Ref Range    % Retic 0.5 0.5 - 2.0 %    Absolute Retic 21.2 (L) 25 - 95 10e9/L   CBC with platelets differential     Status: Abnormal   Result Value Ref Range    WBC 7.9 4.0 - 11.0 10e9/L    RBC Count 3.93 (L) 4.4 - 5.9 10e12/L    Hemoglobin 12.1 (L) 13.3 - 17.7 g/dL    Hematocrit 36.6 (L) 40.0 - 53.0 %    MCV 93 78 - 100 fl    MCH 30.8 26.5 - 33.0 pg    MCHC 33.1 31.5 - 36.5 g/dL    RDW 12.5 10.0 - 15.0 %    Platelet Count 194 150 - 450 10e9/L    Diff Method Automated Method     % Neutrophils 75.8 %    % Lymphocytes 16.1 %    % Monocytes 5.7 %    % Eosinophils 1.4 %    % Basophils 0.5 %    % Immature Granulocytes 0.5 %    Nucleated RBCs 0 0 /100    Absolute Neutrophil 6.0 1.6 - 8.3 10e9/L    Absolute Lymphocytes 1.3 0.8 - 5.3 10e9/L    Absolute Monocytes 0.5 0.0 - 1.3 10e9/L    Absolute Eosinophils 0.1 0.0 - 0.7 10e9/L    Absolute Basophils 0.0 0.0 - 0.2 10e9/L    Abs Immature Granulocytes 0.0 0 - 0.4 10e9/L    Absolute Nucleated RBC 0.0    Blood Morphology Pathologist Review     Status: None    Result Value Ref Range    Copath Report       Patient Name: LOREN PADILLA  MR#: [id number]  Specimen #: [id number]  Collected: 9/25/2020  Received: 9/25/2020  Reported: 9/28/2020 11:44  Ordering Phy(s): NING SWANSON DEAL    For improved result formatting, select 'View Enhanced Report Format' under   Linked Documents section.    TEST(S):  Blood Smear Morphology    FINAL DIAGNOSIS:  Peripheral blood smear:    - Slight normochromic normocytic anemia without increased erythrocyte   regeneration    - No morphologic evidence of hemolysis    I have personally reviewed all specimens and/or slides, including the   listed special stains, and used them  with my medical judgment to determine the final diagnosis.    Electronically signed out by:    Jenise Carmen M.D., [facility]    Technical testing/processing performed at [place], [place]    CLINICAL HISTORY:  From [facility] electronic medical record; 79-year-old male with a history of   hypertension, dyslipidemia, smoking,  GERD  and BPH is scheduled for right shoulder arthroplasty on 10/14/2024   osteoarthritis of right shoulder.    CLINICAL LAB RESULTS:  Battery Order No. Lab Test Code Clinical Result Ref. Range Units Result   Date  Hemogram/Diff/PLT [id number]  WBC Count 7.9 4.0-11.0 10e9/L 9/25/2020 10:22       RBC Count L 3.93 4.4-5.9 10e12/L 9/25/2020 10:22       Hemoglobin L 12.1 13.3-17.7 g/dL 9/25/2020 10:22       Hematocrit L 36.6 40.0-53.0 % 9/25/2020 10:22       MCV 93  fl 9/25/2020 10:22       MCH 30.8 26.5-33.0 pg 9/25/2020 10:22       MCHC 33.1 31.5-36.5 g/dL 9/25/2020 10:22       RDW 12.5 10.0-15.0 % 9/25/2020 10:22       Platelet Count 194 150-450 10e9/L 9/25/2020 10:22        SEE TEXT   9/25/2020 10:22       Text/Comments:  Automated Method       % Neutrophils 75.8  % 9/25/2020 10:22       % Lymphocytes 16.1  % 9/25/2020 10:22       % Monocytes 5.7  % 9/25/2020 10:22       % Eosinophils 1.4   % 9/25/2020 10:22       % Basophils 0.5  % 9/25/2020 10:22       % Immature Grans 0.5  % 9/25/2020  10:22       Nucleated RBCs 0 0 /100 9/25/2020 10:22       abs Neutrophils 6.0 1.6-8.3 10e9/L 9/25/2020 10:22       abs Lymphocytes 1.3 0.8-5.3 10e9/L 9/25/2020 10:22       abs Monocytes 0.5 0.0-1.3 10e9/L 9/25/2020 10:22       abs Eosinophils 0.1 0.0-0.7 10e9/L 9/25/2020 10:22       abs Basophils 0.0 0.0-0.2 10e9/L 9/25/2020 10:22       abs Imm Granulocytes 0.0 0-0.4 10e9/L 9/25/2020 10:22       abs NRBC 0.0   9/25/2020 10:22    Retic   Retic % 0.5 0.5-2.0 % 9/25/2020 10:22       Retic abs L 21.2 25-95 10e9/L 9/25/2020 10:22    MICROSCOPIC DESCRIPTION:  The red blood cells appear normochromic.  Poikilocytosis includes   occasional echinocytes and rare  elliptocytes.  Polychromasia is not increased.  Rouleaux formation is not   increased.  The morphology of the  platelets is normal.    CPT Codes:  A: 10307-WQFLD    TESTING LAB LOCATION:  [facility], [address]   [address] [phone]    COLLECTION SI TE:  Client:  [facility]  Location:  [facility] ([facility])     Hepatic panel (Albumin, ALT, AST, Bili, Alk Phos, TP)     Status: None   Result Value Ref Range    Bilirubin Direct 0.2 0.0 - 0.2 mg/dL    Bilirubin Total 0.6 0.2 - 1.3 mg/dL    Albumin 3.8 3.4 - 5.0 g/dL    Protein Total 7.0 6.8 - 8.8 g/dL    Alkaline Phosphatase 79 40 - 150 U/L    ALT 27 0 - 70 U/L    AST 18 0 - 45 U/L   ABO/Rh type and screen     Status: None   Result Value Ref Range    ABO O     RH(D) Pos     Antibody Screen Neg     Test Valid Only At          [facility]    Specimen Expires 09/28/2020     Blood Bank Comment       Preadmit form and TYSC rec'd 09/25/20 for surgery on 10/14/20. HR[facility]   Results for orders placed or performed in visit on 09/25/20   N terminal pro BNP outpatient     Status:  Abnormal   Result Value Ref Range    N-Terminal Pro Bnp 620 (H) 0 - 450 pg/mL       If you have any questions please call the clinic at [phone]    Sincerely,    Dayday Alberts MD   bmd

## 2020-09-26 LAB
ABO + RH BLD: NORMAL
ABO + RH BLD: NORMAL
BLD GP AB SCN SERPL QL: NORMAL
BLOOD BANK CMNT PATIENT-IMP: NORMAL
BLOOD BANK CMNT PATIENT-IMP: NORMAL
SPECIMEN EXP DATE BLD: NORMAL

## 2020-09-28 ENCOUNTER — PATIENT OUTREACH (OUTPATIENT)
Dept: GERIATRIC MEDICINE | Facility: CLINIC | Age: 80
End: 2020-09-28

## 2020-09-28 ENCOUNTER — TELEPHONE (OUTPATIENT)
Dept: ORTHOPEDICS | Facility: CLINIC | Age: 80
End: 2020-09-28

## 2020-09-28 LAB — COPATH REPORT: NORMAL

## 2020-09-28 NOTE — PROGRESS NOTES
[facility] Care Coordination Contact    9/25/20 Rec'd vm from member stating that he completed his physical and would like to know what can be provided before surgery. Member requests a return call.  (quality of voice message poor, difficult to understand)  9/28/20 Call placed to member, inquired on pre op physical, member stated that he still has a couple of appt's that he will need to complete. Member stated that he has an EKG scheduled on 10/6.  Member stated that he will not know what the recommendations are until he meets with the surgeon after surgery. Member shared that a saff member mentioned possibility of going to a nursing home for short term rehab. Member stated that he needs to have a representative present at the day of surgery and inquired if CC can assist with this. Explained that a CC cannot be a representative.  Member stated that he has no living family members, stated that he does not associate with anyone in his apartment building.  Explained that CC will review with peers on possible resources.  Member stated that he will not be able to have surgery without a representative.    Explained that this care coordinator manager spoke with his care coordinator recently, stated that he will be due for his annual assessment soon, inquired if he would like the assessment done prior to surgery. Member stated that he does not want the assessment done until after surgery. Explained that it may take time to obtain caregivers, inquired if he is in need of a homemaker to assist him. Member stated that he is able to manage on his own at this time.   Explained that CC will follow up with him regarding his need for a representative.  Shea Mo RN, BC  Manager [facility] Care Coordinator   [phone]  (Fax)

## 2020-09-28 NOTE — TELEPHONE ENCOUNTER
[facility] Call Center    Phone Message    May a detailed message be left on voicemail: yes     Reason for Call: Other: Lupe was calling concerning, this patient does not have any assistance at home for after surgery care, wants to discuss.      Action Taken: Message routed to:  [facility] ([facility] Ortho    Travel Screening: Not Applicable

## 2020-10-01 NOTE — RESULT ENCOUNTER NOTE
Here are some labs.  Apparently I was listed as the ordering physician.  Appears they are preop labs.    Dayday Alberts MD

## 2020-10-06 ENCOUNTER — ANCILLARY PROCEDURE (OUTPATIENT)
Dept: CARDIOLOGY | Facility: CLINIC | Age: 80
End: 2020-10-06
Attending: PHYSICIAN ASSISTANT
Payer: COMMERCIAL

## 2020-10-06 PROCEDURE — 93306 TTE W/DOPPLER COMPLETE: CPT | Performed by: INTERNAL MEDICINE

## 2020-10-07 ENCOUNTER — PATIENT OUTREACH (OUTPATIENT)
Dept: GERIATRIC MEDICINE | Facility: CLINIC | Age: 80
End: 2020-10-07

## 2020-10-07 ASSESSMENT — PATIENT HEALTH QUESTIONNAIRE - PHQ9: SUM OF ALL RESPONSES TO PHQ QUESTIONS 1-9: 0

## 2020-10-07 NOTE — PROGRESS NOTES
[facility] Care Coordination Contact    [facility] Home Visit Assessment     Home visit for Health Risk Assessment with Bhaskar Ott completed on October 7, 2020             Assessment completed with:: Patient    Current Care Plan  Member currently receiving the following home care services:   None currently but is interested in getting some services when he returns home after shoulder surgery  Member currently receiving the following community resources:        Medication Review  Medication reconciliation completed in Epic: Yes  Medication set-up & administration: Independent and sets up on own weekly.  Self-administers medications.  Medication Risk Assessment Medication (1 or more, place referral to MTM): N/A: No risk factors identified  MTM Referral Placed: No: No risk factors idenified    Mental/Behavioral Health   Depression Screening:   PHQ-2 Total Score (Adult) - Positive if 3 or more points; Administer PHQ-9 if positive: 0  PHQ-9 Total Score: 0             Falls Assessment:   Fallen 2 or more times in the past year?: No   Any fall with injury in the past year?: No    ADL/IADL Dependencies:  Currently is independent but member did say he was agreeable to some services when discharged to home.  Said laundry is hard for him to do with being dependent on his walker. Gets around the city by using the bus and has an unlimited bus card.  Said he is agreeable to getting [employer] that would be active when he is out in the community.          [facility] Health Plan sponsored benefits: Shared information re: Silver Sneakers/gym memberships, ASA, Calcium +D.    PCA Assessment completed at visit: No     Elderly Waiver Eligibility: Yes-will continue on EW    Care Plan & Recommendations: Will have shoulder replacement on Oct 14 and then to rehab at [facility].  When he is discharged to home would like to get HHA to help with shower and light homemaking and some Homemaking to clean and do laundry.      See [facility] for  detailed assessment information.    Follow-Up Plan: Member informed of future contact, plan to f/u with member with a 6 month telephone assessment.  Contact information shared with member and family, encouraged member to call with any questions or concerns at any time.    [place] care continuum providers: Please refer to Health Care Home on the [facility] Problem List to view this patient's [facility] Care Plan Summary.    Lupe Hyman RN  [facility]   [phone]

## 2020-10-08 NOTE — PROGRESS NOTES
Per [facility] PAR:    Transportation vendor:  [employer] with a  time of 7-7:30am  Member notified.  Dahiana Kim  Case Management Specialist  [facility]  [phone]

## 2020-10-12 ENCOUNTER — TELEPHONE (OUTPATIENT)
Dept: ORTHOPEDICS | Facility: CLINIC | Age: 80
End: 2020-10-12

## 2020-10-12 DIAGNOSIS — Z11.59 ENCOUNTER FOR SCREENING FOR OTHER VIRAL DISEASES: ICD-10-CM

## 2020-10-12 PROCEDURE — U0003 INFECTIOUS AGENT DETECTION BY NUCLEIC ACID (DNA OR RNA); SEVERE ACUTE RESPIRATORY SYNDROME CORONAVIRUS 2 (SARS-COV-2) (CORONAVIRUS DISEASE [COVID-19]), AMPLIFIED PROBE TECHNIQUE, MAKING USE OF HIGH THROUGHPUT TECHNOLOGIES AS DESCRIBED BY CMS-2020-01-R: HCPCS | Performed by: ORTHOPAEDIC SURGERY

## 2020-10-12 RX ORDER — CEFAZOLIN SODIUM 2 G/100ML
2 INJECTION, SOLUTION INTRAVENOUS
Status: CANCELLED | OUTPATIENT
Start: 2020-10-14

## 2020-10-12 RX ORDER — TRANEXAMIC ACID 650 MG/1
1950 TABLET ORAL ONCE
Status: CANCELLED | OUTPATIENT
Start: 2020-10-14 | End: 2020-10-14

## 2020-10-12 RX ORDER — CEFAZOLIN SODIUM 1 G/3ML
1 INJECTION, POWDER, FOR SOLUTION INTRAMUSCULAR; INTRAVENOUS SEE ADMIN INSTRUCTIONS
Status: CANCELLED | OUTPATIENT
Start: 2020-10-14

## 2020-10-12 NOTE — TELEPHONE ENCOUNTER
Health Call Center    Phone Message    May a detailed message be left on voicemail: yes     Reason for Call: Other: Patient is wondering what he needs to do prior to his surgery. He is concerned that his covid test will not be back before the 14th does he need to be concerned about this? And he is wondering if he should take his medication prior to his surgery or if he should bring them along. Can somebody please reach out to patient with information on this?     Action Taken: Message routed to:  [facility] ortho    Travel Screening: Not Applicable

## 2020-10-13 ENCOUNTER — TELEPHONE (OUTPATIENT)
Dept: ORTHOPEDICS | Facility: CLINIC | Age: 80
End: 2020-10-13

## 2020-10-13 LAB
SARS-COV-2 RNA SPEC QL NAA+PROBE: NOT DETECTED
SPECIMEN SOURCE: NORMAL

## 2020-10-13 RX ORDER — HYDROMORPHONE HYDROCHLORIDE 1 MG/ML
.3-.5 INJECTION, SOLUTION INTRAMUSCULAR; INTRAVENOUS; SUBCUTANEOUS EVERY 5 MIN PRN
Status: CANCELLED | OUTPATIENT
Start: 2020-10-13

## 2020-10-13 RX ORDER — SODIUM CHLORIDE, SODIUM LACTATE, POTASSIUM CHLORIDE, CALCIUM CHLORIDE 600; 310; 30; 20 MG/100ML; MG/100ML; MG/100ML; MG/100ML
INJECTION, SOLUTION INTRAVENOUS CONTINUOUS
Status: CANCELLED | OUTPATIENT
Start: 2020-10-13

## 2020-10-13 RX ORDER — NALOXONE HYDROCHLORIDE 0.4 MG/ML
.1-.4 INJECTION, SOLUTION INTRAMUSCULAR; INTRAVENOUS; SUBCUTANEOUS
Status: CANCELLED | OUTPATIENT
Start: 2020-10-13 | End: 2020-10-14

## 2020-10-13 RX ORDER — ONDANSETRON 4 MG/1
4 TABLET, ORALLY DISINTEGRATING ORAL EVERY 30 MIN PRN
Status: CANCELLED | OUTPATIENT
Start: 2020-10-13

## 2020-10-13 RX ORDER — OXYCODONE HYDROCHLORIDE 5 MG/1
5 TABLET ORAL EVERY 4 HOURS PRN
Status: CANCELLED | OUTPATIENT
Start: 2020-10-13

## 2020-10-13 RX ORDER — ONDANSETRON 2 MG/ML
4 INJECTION INTRAMUSCULAR; INTRAVENOUS EVERY 30 MIN PRN
Status: CANCELLED | OUTPATIENT
Start: 2020-10-13

## 2020-10-13 RX ORDER — LIDOCAINE 40 MG/G
CREAM TOPICAL
Status: CANCELLED | OUTPATIENT
Start: 2020-10-13

## 2020-10-13 RX ORDER — FENTANYL CITRATE 50 UG/ML
25-50 INJECTION, SOLUTION INTRAMUSCULAR; INTRAVENOUS
Status: CANCELLED | OUTPATIENT
Start: 2020-10-13

## 2020-10-13 RX ORDER — HYDRALAZINE HYDROCHLORIDE 20 MG/ML
2.5-5 INJECTION INTRAMUSCULAR; INTRAVENOUS EVERY 10 MIN PRN
Status: CANCELLED | OUTPATIENT
Start: 2020-10-13

## 2020-10-13 NOTE — TELEPHONE ENCOUNTER
[facility] ([phone]) was contacted concerning patient's upcoming surgery.  Patient currently takes Oxycodone 5 mg TID.  Pain clinic policy is for the surgeon to manage post-op pain medications per usual duration.  Pain clinic provider is to be contacted when the pain clinic is to resume medication management.

## 2020-10-13 NOTE — TELEPHONE ENCOUNTER
Patient's COVID test is negative.  Message left on patient's voicemail x 2 today with no return call.

## 2020-10-14 ENCOUNTER — PATIENT OUTREACH (OUTPATIENT)
Dept: GERIATRIC MEDICINE | Facility: CLINIC | Age: 80
End: 2020-10-14

## 2020-10-14 ENCOUNTER — PREP FOR PROCEDURE (OUTPATIENT)
Dept: ORTHOPEDICS | Facility: CLINIC | Age: 80
End: 2020-10-14

## 2020-10-14 DIAGNOSIS — M19.011 PRIMARY OSTEOARTHRITIS OF RIGHT SHOULDER: Primary | ICD-10-CM

## 2020-10-14 RX ORDER — FENTANYL CITRATE 50 UG/ML
50 INJECTION, SOLUTION INTRAMUSCULAR; INTRAVENOUS
Status: CANCELLED | OUTPATIENT
Start: 2020-10-14

## 2020-10-14 NOTE — PROGRESS NOTES
[facility] Care Coordination Contact    Member agreed to have surgery rescheduled after missing his ride to hospital. Again recommended that he stay outside or in the lobby and wait for ride even if it is late.    Lupe Hyman RN  [facility]   [phone]

## 2020-10-15 ENCOUNTER — NURSE TRIAGE (OUTPATIENT)
Dept: NURSING | Facility: CLINIC | Age: 80
End: 2020-10-15

## 2020-10-15 ENCOUNTER — PATIENT OUTREACH (OUTPATIENT)
Dept: GERIATRIC MEDICINE | Facility: CLINIC | Age: 80
End: 2020-10-15

## 2020-10-15 NOTE — LETTER
October 15, 2020      LOREN PADILLA  [address]   [facility] [address]      Dear Loren:    At [facility], we are dedicated to improving your health and well-being. Enclosed is the Comprehensive Care Plan that we developed with you on 10/7/2020. Please review the Care Plan carefully.    As a reminder, some of the things we discussed at your visit include:    Your physical and mental health    Ways to reduce falls    Health care needs you may have    Don t forget to contact your care coordinator if you:    Have been hospitalized or plan to be hospitalized     Have had a fall     Have experienced a change in physical health    Are experiencing emotional problems     If you do not agree with your Care Plan, have questions about it, or have experienced a change in your needs, please call me at [phone]. If you are hearing impaired, please call the [place] Relay at 326 or [phone] (zqwqyh-my-zvdktw relay service).    Sincerely,    Lupe Hyman RN    E-mail: [email or web address]  Phone: [phone]      [facility] ([facility]) is a health plan that contracts with both Medicare and the [place] Medical Assistance (Medicaid) program to provide benefits of both programs to enrollees. Enrollment in [facility] depends on contract renewal.    [id number])     [id number] (11/18)

## 2020-10-15 NOTE — PROGRESS NOTES
[facility] Care Coordination Contact    Received after visit chart from care coordinator.  Completed following tasks: Mailed copy of care plan to client, Updated services in access, Submitted referrals/auths for meals & PERS and mailed POC sig sheet w/SASE   and Provider Signature - No POC Shared:  Member indicates that they do not want their POC shared with any EW providers.   Dahiana Kim  Case Management Specialist  [facility]  [phone]

## 2020-10-15 NOTE — TELEPHONE ENCOUNTER
Coronavirus (COVID-19) Notification    Lab Result   Lab test 2019-nCoV rRt-PCR OR SARS-COV-2 PCR    Nasopharyngeal AND/OR Oropharyngeal swab is NEGATIVE for 2019-nCoV RNA [OR] SARS-COV-2 RNA (COVID-19) RNA    Your result was negative. This means that we didn't find the virus that causes COVID-19 in your sample. A test may show negative when you do actually have the virus. This can happen when the virus is in the early stages of infection, before you feel illness symptoms.    If you have symptoms   Stay home and away from others (self-isolate) until you meet ALL of the guidelines below:    You've had no fever--and no medicine that reduces fever--for 1 full day (24 hours). And      Your other symptoms have gotten better. For example, your cough or breathing has improved. And   ; At least 10 days have passed since your symptoms started. (If you've been told by a doctor that you have a weak immune system, wait 20 days.)         During this time:    Stay home. Don't go to work, school or anywhere else.     Stay in your own room, including for meals. Use your own bathroom if you can.    Stay away from others in your home. No hugging, kissing or shaking hands. No visitors.    Clean  high touch  surfaces often (doorknobs, counters, handles, etc.). Use a household cleaning spray or wipes. You can find a full list on the EPA website at www.epa.gov/pesticide-registration/list-n-disinfectants-use-against-sars-cov-2.    Cover your mouth and nose with a mask, tissue or other face covering to avoid spreading germs.    Wash your hands and face often with soap and water.    Going back to work  Check with your employer for any guidelines to follow for going back to work.  You are sent a letter for your Employer which will serve as formal document notice that you, the employee, tested negative for COVID-19, as of the testing date shown above.    If your symptoms worsen or other concerning symptoms, contact PCP, oncare or consider  returning to Emergency Dept.    Where can I get more information?    [facility]: [email or web address]    Coronavirus Basics: www.health.[facility].[place]/diseases/coronavirus/basics.html    [facility] Hotline (669-974-3542)    {Name]    He is due for surgery. He was unable to have the surgery. He's going to have to contact the clinic to find out next steps.  Jane Cuellar RN  [facility]

## 2020-10-16 ENCOUNTER — TELEPHONE (OUTPATIENT)
Dept: ORTHOPEDICS | Facility: CLINIC | Age: 80
End: 2020-10-16

## 2020-10-16 NOTE — TELEPHONE ENCOUNTER
Attempted to reach out to Lupe to discuss rescheduling Russells surgery with Dr. Mandujano. Left detailed message that we are currently holing an OR date of 11/11/20. Left best call back number of [phone]

## 2020-10-19 ENCOUNTER — PATIENT OUTREACH (OUTPATIENT)
Dept: GERIATRIC MEDICINE | Facility: CLINIC | Age: 80
End: 2020-10-19

## 2020-10-19 ENCOUNTER — HOSPITAL ENCOUNTER (INPATIENT)
Facility: CLINIC | Age: 80
Setting detail: SURGERY ADMIT
End: 2020-10-19
Attending: ORTHOPAEDIC SURGERY | Admitting: ORTHOPAEDIC SURGERY
Payer: COMMERCIAL

## 2020-10-19 DIAGNOSIS — Z11.59 ENCOUNTER FOR SCREENING FOR OTHER VIRAL DISEASES: Primary | ICD-10-CM

## 2020-10-19 NOTE — TELEPHONE ENCOUNTER
Received voicemail from patient stating that he knew surgery was rescheduled to 11/11. Patient had questions if he needed any other appointments. Attempted to reach out to patient to discuss him needing a pre op physical with his PCP. Left best call back number of [phone]

## 2020-10-19 NOTE — PROGRESS NOTES
"[facility] Care Coordination Contact    This care coordinator called member at 6:30 am on day of surgery Oct 14. Member said he was ready and checking the time \"every ten seconds\". Ride was scheduled to come between 7-7:30. At 7\"20 member called saying that ride was not there and that care coordinator had sent a letter saying ride would come between 7-15.  Care coordinator told member to keep waiting and ride had until 7-30a. Care coordinator called CMS to be sure that transport company was on its way.  CMS did and was assured that they were coming.  Call hours later from Renee, the ortho nurse who said that member was a no show and when they called him he was eating breakfast and said the ride never came.  She said if member hadn't eaten they would have been able to get him in later that day.  CMS follow up with transport company who said that they were there from 720 to 7:30. When asked member about this he said he was likely in the lobby and not ourside but he did not see any car that looked like transport company.  He also said that he thought they would come in and get him.  Member's surgery is rescheduled for Nov. 11.  Member asked if he would have to do the same thing again.  He will.  Updated member's phone number which was incorrect    Lupe Hyman RN  [facility]   [phone]  "

## 2020-10-26 ENCOUNTER — PATIENT OUTREACH (OUTPATIENT)
Dept: GERIATRIC MEDICINE | Facility: CLINIC | Age: 80
End: 2020-10-26

## 2020-10-26 NOTE — PROGRESS NOTES
[facility] Care Coordination Contact    Member missed his scheduled shoulder surgery on Oct.14. The surgery nurse called to inform that the surgery has been rescheduled for Nov. 11.  Care coordinator called member to inform of surgery and to inform that member had to get another pre op and Covid test.  Member called to say he had scheduled   Appointment with PCP on Nov. 4. Member is also interested in getting a new mattress for his hospital bed and will need a face to face visit for this.  Recommended that member talk to his PCP at the pre op appointment and he agreed to do that. Informed member that we will arrange transport to the hospital on Nov. 11. Confirmed phone numbers for transport company.  Lupe Hyman RN  [facility]   [phone]

## 2020-10-27 ENCOUNTER — PATIENT OUTREACH (OUTPATIENT)
Dept: GERIATRIC MEDICINE | Facility: CLINIC | Age: 80
End: 2020-10-27

## 2020-10-27 NOTE — PROGRESS NOTES
[facility] Care Coordination Contact    Arranged transportation thru [facility] PAR for the below appt:  Appt Date & Time: 11/11 - arrival at 11am  Clinic Name & Address:  [facility], [address] [place] [address]  Transportation Provider: [employer]   time:  10:25am    Notified member of  time via phone and mailed letter.  Dahiana Kim  Case Management Specialist  [facility]  [phone]

## 2020-10-27 NOTE — PROGRESS NOTES
[facility] Care Coordination Contact    Arranged transportation thru [facility] PAR for the below appt:  Appt Date & Time: 11/4 at 12 noon  Clinic Name & Address:  [facility]  Transportation Provider: [employer] [phone]   time:  11-11:30am    Notified member of  time via vm and by letter.  Dahiana Kim  Case Management Specialist  [facility]  [phone]

## 2020-11-01 DIAGNOSIS — N40.1 BENIGN NON-NODULAR PROSTATIC HYPERPLASIA WITH LOWER URINARY TRACT SYMPTOMS: ICD-10-CM

## 2020-11-03 RX ORDER — FINASTERIDE 5 MG/1
TABLET, FILM COATED ORAL
Qty: 90 TABLET | Refills: 2 | Status: SHIPPED | OUTPATIENT
Start: 2020-11-03 | End: 2021-08-02

## 2020-11-04 ENCOUNTER — PATIENT OUTREACH (OUTPATIENT)
Dept: GERIATRIC MEDICINE | Facility: CLINIC | Age: 80
End: 2020-11-04
Payer: COMMERCIAL

## 2020-11-06 RX ORDER — CEFAZOLIN SODIUM 1 G/3ML
1 INJECTION, POWDER, FOR SOLUTION INTRAMUSCULAR; INTRAVENOUS SEE ADMIN INSTRUCTIONS
Status: CANCELLED | OUTPATIENT
Start: 2020-11-11

## 2020-11-06 RX ORDER — CEFAZOLIN SODIUM 2 G/100ML
2 INJECTION, SOLUTION INTRAVENOUS
Status: CANCELLED | OUTPATIENT
Start: 2020-11-11

## 2020-11-09 ENCOUNTER — TELEPHONE (OUTPATIENT)
Dept: ORTHOPEDICS | Facility: CLINIC | Age: 80
End: 2020-11-09

## 2020-11-09 NOTE — OR NURSING
No H&P or covid test. Pt hung up for pre-op call.  Received: Today  Message Contents   Adelia Felder RN Braman, Jonathan Patrick, MD   Cc: Sylvia Jerome RN; Renee Kuo RN PAS Notification: Your patient is scheduled for surgery in  2 days. Critical chart components are missing. If the required components are not completed in EPIC by noon the day prior to surgery your case may be rescheduled. Thank you in advance for completing the record and improving [facility]'s quality of care.     Missing Components: 1.H&P within 30 days                                       2. Covid test within 4 days of surgery      I called pt for pre-op call today. Pt said there have been complications trying to get surgery scheduled. I told him he is still on the schedule on 11/11 and that he needed a covid test and a pre-op physical. I asked him if his clinic appt tomorrow was for a physical. He said no-I'm just trying to get medication. He said I don't think you understand me-I told you there are complications and hung up. Please follow up with pt. Thanks so much.     Surgeon's Name: Dr Barker   Date of Surgery: 11/11/20   Procedure: Reverse total shoulder arthroplasty Right       Adelia Felder RN, BSN   Pre-Anesthesia Screening Department [facility]   [phone]    [phone] fax

## 2020-11-09 NOTE — TELEPHONE ENCOUNTER
Received phone call from pre admission nurses stating that patient no showed his pre op physical and patient will need to reschedule. Attempted to reach out to patient to discuss rescheduling. Left best call back number of [phone]

## 2020-11-10 ENCOUNTER — PATIENT OUTREACH (OUTPATIENT)
Dept: GERIATRIC MEDICINE | Facility: CLINIC | Age: 80
End: 2020-11-10

## 2020-11-10 ENCOUNTER — OFFICE VISIT (OUTPATIENT)
Dept: FAMILY MEDICINE | Facility: CLINIC | Age: 80
End: 2020-11-10
Payer: COMMERCIAL

## 2020-11-10 VITALS
SYSTOLIC BLOOD PRESSURE: 115 MMHG | WEIGHT: 121 LBS | HEART RATE: 56 BPM | TEMPERATURE: 97.9 F | DIASTOLIC BLOOD PRESSURE: 70 MMHG | BODY MASS INDEX: 21.43 KG/M2 | OXYGEN SATURATION: 99 %

## 2020-11-10 DIAGNOSIS — N40.1 BENIGN NON-NODULAR PROSTATIC HYPERPLASIA WITH LOWER URINARY TRACT SYMPTOMS: ICD-10-CM

## 2020-11-10 DIAGNOSIS — G47.00 INSOMNIA, UNSPECIFIED TYPE: ICD-10-CM

## 2020-11-10 DIAGNOSIS — N18.30 STAGE 3 CHRONIC KIDNEY DISEASE, UNSPECIFIED WHETHER STAGE 3A OR 3B CKD (H): ICD-10-CM

## 2020-11-10 DIAGNOSIS — C61 PROSTATE CANCER (H): ICD-10-CM

## 2020-11-10 DIAGNOSIS — I10 BENIGN ESSENTIAL HYPERTENSION: Primary | ICD-10-CM

## 2020-11-10 DIAGNOSIS — M19.011 PRIMARY OSTEOARTHRITIS OF RIGHT SHOULDER: ICD-10-CM

## 2020-11-10 DIAGNOSIS — N32.81 OVERACTIVE BLADDER: ICD-10-CM

## 2020-11-10 LAB
ANION GAP SERPL CALCULATED.3IONS-SCNC: 6 MMOL/L (ref 3–14)
BUN SERPL-MCNC: 28 MG/DL (ref 7–30)
CALCIUM SERPL-MCNC: 9.3 MG/DL (ref 8.5–10.1)
CHLORIDE SERPL-SCNC: 108 MMOL/L (ref 94–109)
CO2 SERPL-SCNC: 25 MMOL/L (ref 20–32)
CREAT SERPL-MCNC: 1.18 MG/DL (ref 0.66–1.25)
ERYTHROCYTE [DISTWIDTH] IN BLOOD BY AUTOMATED COUNT: 12.7 % (ref 10–15)
GFR SERPL CREATININE-BSD FRML MDRD: 58 ML/MIN/{1.73_M2}
GLUCOSE SERPL-MCNC: 84 MG/DL (ref 70–99)
HCT VFR BLD AUTO: 36.6 % (ref 40–53)
HGB BLD-MCNC: 12.7 G/DL (ref 13.3–17.7)
MCH RBC QN AUTO: 31.4 PG (ref 26.5–33)
MCHC RBC AUTO-ENTMCNC: 34.7 G/DL (ref 31.5–36.5)
MCV RBC AUTO: 91 FL (ref 78–100)
PLATELET # BLD AUTO: 215 10E9/L (ref 150–450)
POTASSIUM SERPL-SCNC: 4.2 MMOL/L (ref 3.4–5.3)
RBC # BLD AUTO: 4.04 10E12/L (ref 4.4–5.9)
SODIUM SERPL-SCNC: 139 MMOL/L (ref 133–144)
WBC # BLD AUTO: 7.1 10E9/L (ref 4–11)

## 2020-11-10 PROCEDURE — 99214 OFFICE O/P EST MOD 30 MIN: CPT | Performed by: PHYSICIAN ASSISTANT

## 2020-11-10 PROCEDURE — 36415 COLL VENOUS BLD VENIPUNCTURE: CPT | Performed by: PHYSICIAN ASSISTANT

## 2020-11-10 PROCEDURE — 80048 BASIC METABOLIC PNL TOTAL CA: CPT | Performed by: PHYSICIAN ASSISTANT

## 2020-11-10 PROCEDURE — 85027 COMPLETE CBC AUTOMATED: CPT | Performed by: PHYSICIAN ASSISTANT

## 2020-11-10 RX ORDER — AMLODIPINE BESYLATE 5 MG/1
5 TABLET ORAL DAILY
Qty: 90 TABLET | Refills: 3 | Status: SHIPPED | OUTPATIENT
Start: 2020-11-10 | End: 2021-08-09

## 2020-11-10 RX ORDER — GABAPENTIN 100 MG/1
CAPSULE ORAL
Qty: 180 CAPSULE | Refills: 0 | Status: SHIPPED | OUTPATIENT
Start: 2020-11-10 | End: 2021-02-10

## 2020-11-10 RX ORDER — OXYBUTYNIN CHLORIDE 5 MG/1
5 TABLET ORAL 3 TIMES DAILY
Qty: 270 TABLET | Refills: 0 | Status: SHIPPED | OUTPATIENT
Start: 2020-11-10 | End: 2021-02-04

## 2020-11-10 RX ORDER — TAMSULOSIN HYDROCHLORIDE 0.4 MG/1
0.4 CAPSULE ORAL 2 TIMES DAILY
Qty: 180 CAPSULE | Refills: 0 | Status: SHIPPED | OUTPATIENT
Start: 2020-11-10 | End: 2021-02-04

## 2020-11-10 NOTE — PROGRESS NOTES
[facility] Care Coordination Contact    Call from nurse at surgery clinic to inform that member missed his pre-op. This care coordinator attempted to call member many times to see what happened but goes to .  Called nurse back to see if she had called member and if in fact, the surgery for tomorrow is canceled.  No call back yet.    Lupe Hyman RN  [facility]   [phone]

## 2020-11-10 NOTE — PROGRESS NOTES
Subjective     Bhaskar Ott is a 79 year old male who presents to clinic today for the following health issues:    HPI          Patient is here today to fill multiple medications. He would like to fill amlodipine (BP), gabapentin (insomnia), flomax (h/o prostate cancer), and oxybutinin (overactive bladder). He has no concerns with any of the medications and is just looking for refills.   He does not check his blood pressure at home. Blood pressure has been stable.     Hypertension Follow-up      Do you check your blood pressure regularly outside of the clinic? No     Are you following a low salt diet? No    Are your blood pressures ever more than 140 on the top number (systolic) OR more   than 90 on the bottom number (diastolic), for example 140/90? No    Patient feels the gabapentin works to help him sleep at night. Takes 2 every evening.     Patient had been scheduled for reverse total shoulder, but had transportation issues getting to his preop, so the surgery was cancelled. Patient is very upset about this and brings this up multiple times throughout the visit. He has chronic shoulder pain and would like this resolved.     Review of Systems   Constitutional, HEENT, cardiovascular, pulmonary, gi and gu systems are negative, except as otherwise noted.      Objective    /70 (BP Location: Right arm, Patient Position: Chair, Cuff Size: Adult Regular)   Pulse 56   Temp 97.9  F (36.6  C) (Oral)   Wt 54.9 kg (121 lb)   SpO2 99%   BMI 21.43 kg/m    Body mass index is 21.43 kg/m .  Physical Exam   GENERAL: healthy, alert and no distress  NECK: no adenopathy, no asymmetry, masses, or scars and thyroid normal to palpation  RESP: lungs clear to auscultation - no rales, rhonchi or wheezes  CV: regular rate and rhythm, normal S1 S2, no S3 or S4, no murmur, click or rub, no peripheral edema and peripheral pulses strong  ABDOMEN: soft, nontender, no hepatosplenomegaly, no masses and bowel sounds normal  MS: no  gross musculoskeletal defects noted, no edema    Results for orders placed or performed in visit on 11/10/20 (from the past 24 hour(s))   CBC with platelets   Result Value Ref Range    WBC 7.1 4.0 - 11.0 10e9/L    RBC Count 4.04 (L) 4.4 - 5.9 10e12/L    Hemoglobin 12.7 (L) 13.3 - 17.7 g/dL    Hematocrit 36.6 (L) 40.0 - 53.0 %    MCV 91 78 - 100 fl    MCH 31.4 26.5 - 33.0 pg    MCHC 34.7 31.5 - 36.5 g/dL    RDW 12.7 10.0 - 15.0 %    Platelet Count 215 150 - 450 10e9/L           Assessment & Plan     Benign essential hypertension  Stage 3 chronic kidney disease, unspecified whether stage 3a or 3b CKD  Blood pressure within goal today. Will refill amlodipine. Will check BMP to eval CKD. Recheck in clinic with PCP in 6 months.   - amLODIPine (NORVASC) 5 MG tablet; Take 1 tablet (5 mg) by mouth daily  - Basic metabolic panel  (Ca, Cl, CO2, Creat, Gluc, K, Na, BUN)  - CBC with platelets    Prostate cancer (H)  Benign non-nodular prostatic hyperplasia with lower urinary tract symptoms  Stable. Has been taking tamsulosin 0.4 mg twice daily instead of 0.8 mg daily.   - tamsulosin (FLOMAX) 0.4 MG capsule; Take 1 capsule (0.4 mg) by mouth 2 times daily    Overactive bladder  Stable. No concerns.   - oxybutynin (DITROPAN) 5 MG tablet; Take 1 tablet (5 mg) by mouth 3 times daily    Insomnia, unspecified type  Stable.   - gabapentin (NEURONTIN) 100 MG capsule; TAKE 1-2 BY MOUTH AT BEDTIME AS NEEDED FOR SLEEP    Primary osteoarthritis of right shoulder  Patient very upset about issue regarding transportation to his pre op exam. Advised patient to call orthopedics to have surgery rescheduled. He reports he'd rather figure out why transportation didn't pick him up. Patient became very upset when suggesting a miscommunication with the transportation company or issue with a  (accident, illness).  Feels it is because he is poor.          No follow-ups on file.    Lisa Sheehan PA-C  [facility]  HEIGHTS

## 2020-11-10 NOTE — LETTER
[facility]   [address] [place]  [address]  [phone]                                   November 11, 2020    Bhaskar Ott  [address]   [facility] [address]        Dear Bhaskar,      Labs look great. No concerns.     Results for orders placed or performed in visit on 11/10/20   Basic metabolic panel  (Ca, Cl, CO2, Creat, Gluc, K, Na, BUN)     Status: Abnormal   Result Value Ref Range    Sodium 139 133 - 144 mmol/L    Potassium 4.2 3.4 - 5.3 mmol/L    Chloride 108 94 - 109 mmol/L    Carbon Dioxide 25 20 - 32 mmol/L    Anion Gap 6 3 - 14 mmol/L    Glucose 84 70 - 99 mg/dL    Urea Nitrogen 28 7 - 30 mg/dL    Creatinine 1.18 0.66 - 1.25 mg/dL    GFR Estimate 58 (L) >60 mL/min/[1.73_m2]    GFR Estimate If Black 67 >60 mL/min/[1.73_m2]    Calcium 9.3 8.5 - 10.1 mg/dL   CBC with platelets     Status: Abnormal   Result Value Ref Range    WBC 7.1 4.0 - 11.0 10e9/L    RBC Count 4.04 (L) 4.4 - 5.9 10e12/L    Hemoglobin 12.7 (L) 13.3 - 17.7 g/dL    Hematocrit 36.6 (L) 40.0 - 53.0 %    MCV 91 78 - 100 fl    MCH 31.4 26.5 - 33.0 pg    MCHC 34.7 31.5 - 36.5 g/dL    RDW 12.7 10.0 - 15.0 %    Platelet Count 215 150 - 450 10e9/L   If you have any questions please call the clinic at [phone]    Sincerely,    Lisa Sheehan PA-C  bmd

## 2020-11-10 NOTE — PROGRESS NOTES
Encounter completed in error.  Shea Mo RN, BC  [facility] Coordinator   [phone]  (Fax)

## 2020-11-11 ENCOUNTER — TELEPHONE (OUTPATIENT)
Dept: FAMILY MEDICINE | Facility: CLINIC | Age: 80
End: 2020-11-11

## 2020-11-11 ENCOUNTER — PATIENT OUTREACH (OUTPATIENT)
Dept: GERIATRIC MEDICINE | Facility: CLINIC | Age: 80
End: 2020-11-11

## 2020-11-11 NOTE — PROGRESS NOTES
[facility] Care Coordination Contact    11/9/20 call from Renee Kuo the surgery nurse for Dr Rosas.  She said the surgery could only happen on 11/11/20 if member got a Covid test and had a pre-op. If this was not completed by noon on 11/10 the surgery would be cancelled. When she attempted to call member he said it was complicated and that there were transportation issues. This care coordinator called Adelia Felder, the surgery coordinator for Dr Del Rosario. She said she was hung up on by member. This care coordinator left  for member and he called the next day, 11/11/20 to ask what I was calling about.  When I said I just wanted to know what happened to the pre-op appointment on 11/4. Member said the ride never came and he was going to take legal action. Told him we had a call out to [facility] to find out what happened. Member said that he had missed two important surgeries because of transportation. [facility] did get confirmation that member had called them to cancel the appt for pre-op on 11/4. Called Renee Kuo to inform. She agreed that if member wants this surgery he would need to set up his pre-op appointment.      Lupe Hyman RN  [facility]   [phone]

## 2020-11-11 NOTE — TELEPHONE ENCOUNTER
Forms received from: [employer]   Phone number listed: [phone]   Fax listed: [phone]  Date received: 11/11/2020  Form description: group 1 support surface  Once forms are completed, please return to [employer] via fax.  Form placed: in providers folder  Irasema De Leon

## 2020-11-12 NOTE — PROGRESS NOTES
Call made to [employer] [phone], 11/11/20 at 6:35am to cancel 11/11 ride due to fact member never made it to pre-op appt on 11/4 per CC.  CMS made return call to [employer] at 3:12pm on 11/11/20 as member informing CC that ride never arrived 11/4.  [employer] informed CMS that member cancelled ride on 11/4.  They also informed me that  ride on 11/11/20 was cancelled but not noted by who in their system.    11/12/20  At 8:35am spoke with Grant , Ravindra.  He verified that their system does shows member called to cancel ride on 11/4.  They also show ride did show on 11/11 because message of the cancel did not get to  in timely fashion as their dispatch does not arrive until 7am and cancel request was made prior to that time on 11/11 with someone who normally does not take calls.  CC notified.  Dahiana Kim  Case Management Specialist  [facility]  [phone]

## 2020-11-17 ENCOUNTER — PATIENT OUTREACH (OUTPATIENT)
Dept: GERIATRIC MEDICINE | Facility: CLINIC | Age: 80
End: 2020-11-17

## 2020-11-17 NOTE — PROGRESS NOTES
"[facility] Care Coordination Contact    Call from member to ask if this care coordinator has heard from [employer] on why the ride did not come on 11/4/20 when member was scheduled for a pre-op before shoulder surgery scheduled 11/11/20. CMS called [employer] who reporedt that member cancelled that ride on 11/4/20. Today member states that that never happened and we were lied to.  Said he will \"deal with this legally\".    Lupe Hyman RN  [facility]   [phone]  "

## 2020-11-20 ENCOUNTER — TELEPHONE (OUTPATIENT)
Dept: FAMILY MEDICINE | Facility: CLINIC | Age: 80
End: 2020-11-20

## 2020-11-20 NOTE — TELEPHONE ENCOUNTER
Forms received from: [facility]   Phone number listed: [phone]   Fax listed: [phone]  Date received: 11/20/2020  Form description: support surface  Once forms are completed, please return to [facility] via fax.  Form placed: in providers folder  Irasema De Leon

## 2020-11-24 ENCOUNTER — PATIENT OUTREACH (OUTPATIENT)
Dept: GERIATRIC MEDICINE | Facility: CLINIC | Age: 80
End: 2020-11-24

## 2020-11-24 NOTE — PROGRESS NOTES
[facility] Care Coordination Contact    Call from member requesting to cancel [employer].  Said it was too much for his freezer and he didn't want a reduction in meals but canceled altogether.  Also shared that he did not know what he was going to do about his shoulder surgery.     Lupe Hyman RN  [facility]   [phone]

## 2020-12-07 ENCOUNTER — PATIENT OUTREACH (OUTPATIENT)
Dept: GERIATRIC MEDICINE | Facility: CLINIC | Age: 80
End: 2020-12-07

## 2020-12-07 NOTE — PROGRESS NOTES
"[facility] Care Coordination Contact    Call to member to ask if he is still interested in starting Homemaking services.  Member had agreed that this might be a helpful service but when his surgery was canceled he did not respond whether he would still want this service.  Called Erica alanis Moran who had a staff person that was able to start.  Called member again to inform of this but went to .  Did not call back.  Also, called member to ask if he wanted to go to [place] to continue to see Dr Alberts or wanted help to find a new PCP at a closer clinic.  This too, went to  with no response.   Called member to inform that he needed a \"face to face\" with his PCP to get a new mattress and asked if he wanted help setting that up.  During Covid, face to face visits can be virtual.  Member did not return that call.    Lupe Hyman RN  [facility]   [phone]  "

## 2020-12-11 ENCOUNTER — MEDICAL CORRESPONDENCE (OUTPATIENT)
Dept: HEALTH INFORMATION MANAGEMENT | Facility: CLINIC | Age: 80
End: 2020-12-11

## 2020-12-11 ENCOUNTER — OFFICE VISIT (OUTPATIENT)
Dept: FAMILY MEDICINE | Facility: CLINIC | Age: 80
End: 2020-12-11
Payer: COMMERCIAL

## 2020-12-11 VITALS
BODY MASS INDEX: 23.44 KG/M2 | HEART RATE: 72 BPM | SYSTOLIC BLOOD PRESSURE: 139 MMHG | TEMPERATURE: 98.4 F | DIASTOLIC BLOOD PRESSURE: 81 MMHG | WEIGHT: 132.31 LBS | OXYGEN SATURATION: 99 %

## 2020-12-11 DIAGNOSIS — E78.00 PURE HYPERCHOLESTEROLEMIA: ICD-10-CM

## 2020-12-11 DIAGNOSIS — D64.9 ANEMIA, UNSPECIFIED TYPE: ICD-10-CM

## 2020-12-11 DIAGNOSIS — R53.1 WEAKNESS: ICD-10-CM

## 2020-12-11 DIAGNOSIS — R53.83 FATIGUE, UNSPECIFIED TYPE: ICD-10-CM

## 2020-12-11 DIAGNOSIS — K40.90 RIGHT INGUINAL HERNIA: ICD-10-CM

## 2020-12-11 DIAGNOSIS — M54.9 CHRONIC BACK PAIN, UNSPECIFIED BACK LOCATION, UNSPECIFIED BACK PAIN LATERALITY: Primary | ICD-10-CM

## 2020-12-11 DIAGNOSIS — R73.01 IMPAIRED FASTING GLUCOSE: ICD-10-CM

## 2020-12-11 DIAGNOSIS — G89.29 CHRONIC BACK PAIN, UNSPECIFIED BACK LOCATION, UNSPECIFIED BACK PAIN LATERALITY: Primary | ICD-10-CM

## 2020-12-11 LAB
ALBUMIN SERPL-MCNC: 3.7 G/DL (ref 3.4–5)
ALP SERPL-CCNC: 87 U/L (ref 40–150)
ALT SERPL W P-5'-P-CCNC: 38 U/L (ref 0–70)
ANION GAP SERPL CALCULATED.3IONS-SCNC: 5 MMOL/L (ref 3–14)
AST SERPL W P-5'-P-CCNC: 26 U/L (ref 0–45)
BASOPHILS # BLD AUTO: 0.1 10E9/L (ref 0–0.2)
BASOPHILS NFR BLD AUTO: 0.8 %
BILIRUB SERPL-MCNC: 0.5 MG/DL (ref 0.2–1.3)
BUN SERPL-MCNC: 25 MG/DL (ref 7–30)
CALCIUM SERPL-MCNC: 9 MG/DL (ref 8.5–10.1)
CHLORIDE SERPL-SCNC: 105 MMOL/L (ref 94–109)
CO2 SERPL-SCNC: 30 MMOL/L (ref 20–32)
CREAT SERPL-MCNC: 1.14 MG/DL (ref 0.66–1.25)
DIFFERENTIAL METHOD BLD: ABNORMAL
EOSINOPHIL # BLD AUTO: 0.2 10E9/L (ref 0–0.7)
EOSINOPHIL NFR BLD AUTO: 3.4 %
ERYTHROCYTE [DISTWIDTH] IN BLOOD BY AUTOMATED COUNT: 13.8 % (ref 10–15)
FERRITIN SERPL-MCNC: 70 NG/ML (ref 26–388)
GFR SERPL CREATININE-BSD FRML MDRD: 60 ML/MIN/{1.73_M2}
GLUCOSE SERPL-MCNC: 91 MG/DL (ref 70–99)
HBA1C MFR BLD: 5.2 % (ref 0–5.6)
HCT VFR BLD AUTO: 34.4 % (ref 40–53)
HGB BLD-MCNC: 11.7 G/DL (ref 13.3–17.7)
LYMPHOCYTES # BLD AUTO: 1.1 10E9/L (ref 0.8–5.3)
LYMPHOCYTES NFR BLD AUTO: 18.2 %
MCH RBC QN AUTO: 31.3 PG (ref 26.5–33)
MCHC RBC AUTO-ENTMCNC: 34 G/DL (ref 31.5–36.5)
MCV RBC AUTO: 92 FL (ref 78–100)
MONOCYTES # BLD AUTO: 0.3 10E9/L (ref 0–1.3)
MONOCYTES NFR BLD AUTO: 5.1 %
NEUTROPHILS # BLD AUTO: 4.5 10E9/L (ref 1.6–8.3)
NEUTROPHILS NFR BLD AUTO: 72.5 %
PLATELET # BLD AUTO: 206 10E9/L (ref 150–450)
POTASSIUM SERPL-SCNC: 4.5 MMOL/L (ref 3.4–5.3)
PROT SERPL-MCNC: 6.4 G/DL (ref 6.8–8.8)
RBC # BLD AUTO: 3.74 10E12/L (ref 4.4–5.9)
SODIUM SERPL-SCNC: 140 MMOL/L (ref 133–144)
VIT B12 SERPL-MCNC: 1540 PG/ML (ref 193–986)
WBC # BLD AUTO: 6.2 10E9/L (ref 4–11)

## 2020-12-11 PROCEDURE — 82607 VITAMIN B-12: CPT | Performed by: FAMILY MEDICINE

## 2020-12-11 PROCEDURE — 82728 ASSAY OF FERRITIN: CPT | Performed by: FAMILY MEDICINE

## 2020-12-11 PROCEDURE — 83036 HEMOGLOBIN GLYCOSYLATED A1C: CPT | Performed by: FAMILY MEDICINE

## 2020-12-11 PROCEDURE — 85025 COMPLETE CBC W/AUTO DIFF WBC: CPT | Performed by: FAMILY MEDICINE

## 2020-12-11 PROCEDURE — 36415 COLL VENOUS BLD VENIPUNCTURE: CPT | Performed by: FAMILY MEDICINE

## 2020-12-11 PROCEDURE — 82746 ASSAY OF FOLIC ACID SERUM: CPT | Performed by: FAMILY MEDICINE

## 2020-12-11 PROCEDURE — 99214 OFFICE O/P EST MOD 30 MIN: CPT | Performed by: FAMILY MEDICINE

## 2020-12-11 PROCEDURE — 80053 COMPREHEN METABOLIC PANEL: CPT | Performed by: FAMILY MEDICINE

## 2020-12-11 RX ORDER — PRAVASTATIN SODIUM 40 MG
40 TABLET ORAL DAILY
Qty: 90 TABLET | Refills: 1 | Status: SHIPPED | OUTPATIENT
Start: 2020-12-11 | End: 2021-03-05

## 2020-12-11 NOTE — PATIENT INSTRUCTIONS
See general surgeon for the hernia ( right inguinal hernia )    Follow up with orthopedics for the shoulder    We will send you lab results

## 2020-12-11 NOTE — LETTER
December 15, 2020      Bhaskar Ott  [address]   [facility] [address]        Dear ,    We are writing to inform you of your test results.    The diabetes test ( hemoglobin a1c ) is normal.  You do not have diabetes.     The red blood count ( hemoglobin ) is still low.  Advise taking one over the counter iron pill daily ( watch for constipation) and then we could recheck this in 3-4 months.     Other labs are okay/ stable      Resulted Orders   Hemoglobin A1c   Result Value Ref Range    Hemoglobin A1C 5.2 0 - 5.6 %      Comment:      Normal <5.7% Prediabetes 5.7-6.4%  Diabetes 6.5% or higher - adopted from ADA   consensus guidelines.     Comprehensive metabolic panel   Result Value Ref Range    Sodium 140 133 - 144 mmol/L    Potassium 4.5 3.4 - 5.3 mmol/L    Chloride 105 94 - 109 mmol/L    Carbon Dioxide 30 20 - 32 mmol/L    Anion Gap 5 3 - 14 mmol/L    Glucose 91 70 - 99 mg/dL    Urea Nitrogen 25 7 - 30 mg/dL    Creatinine 1.14 0.66 - 1.25 mg/dL    GFR Estimate 60 (L) >60 mL/min/[1.73_m2]      Comment:      Non  GFR Calc  Starting 12/18/2018, serum creatinine based estimated GFR (eGFR) will be   calculated using the Chronic Kidney Disease Epidemiology Collaboration   (CKD-EPI) equation.      GFR Estimate If Black 70 >60 mL/min/[1.73_m2]      Comment:       GFR Calc  Starting 12/18/2018, serum creatinine based estimated GFR (eGFR) will be   calculated using the Chronic Kidney Disease Epidemiology Collaboration   (CKD-EPI) equation.      Calcium 9.0 8.5 - 10.1 mg/dL    Bilirubin Total 0.5 0.2 - 1.3 mg/dL    Albumin 3.7 3.4 - 5.0 g/dL    Protein Total 6.4 (L) 6.8 - 8.8 g/dL    Alkaline Phosphatase 87 40 - 150 U/L    ALT 38 0 - 70 U/L    AST 26 0 - 45 U/L   CBC with platelets differential   Result Value Ref Range    WBC 6.2 4.0 - 11.0 10e9/L    RBC Count 3.74 (L) 4.4 - 5.9 10e12/L    Hemoglobin 11.7 (L) 13.3 - 17.7 g/dL    Hematocrit 34.4 (L) 40.0 - 53.0  %    MCV 92 78 - 100 fl    MCH 31.3 26.5 - 33.0 pg    MCHC 34.0 31.5 - 36.5 g/dL    RDW 13.8 10.0 - 15.0 %    Platelet Count 206 150 - 450 10e9/L    % Neutrophils 72.5 %    % Lymphocytes 18.2 %    % Monocytes 5.1 %    % Eosinophils 3.4 %    % Basophils 0.8 %    Absolute Neutrophil 4.5 1.6 - 8.3 10e9/L    Absolute Lymphocytes 1.1 0.8 - 5.3 10e9/L    Absolute Monocytes 0.3 0.0 - 1.3 10e9/L    Absolute Eosinophils 0.2 0.0 - 0.7 10e9/L    Absolute Basophils 0.1 0.0 - 0.2 10e9/L    Diff Method Automated Method    Vitamin B12   Result Value Ref Range    Vitamin B12 1,540 (H) 193 - 986 pg/mL   Ferritin   Result Value Ref Range    Ferritin 70 26 - 388 ng/mL   Folate   Result Value Ref Range    Folate 50.6 >5.4 ng/mL       If you have any questions or concerns, please call the clinic at the number listed above.       Sincerely,      Dayday Alberts MD/cliff

## 2020-12-11 NOTE — PROGRESS NOTES
Subjective     Bhaskar Ott is a 80 year old male who presents to clinic today for the following health issues:    HPI         Follow up       Review of Systems   Stressed  Out    Shoulder bad  Still planning on shoulder surgery    Not rescheduled  Yet    Patient needs paperwork done for special mattress  Needs hospital type ultra foam mattress    Has an old hospital type mattress but it is all worn out    Main diagnoses that are need for special mattress is chronic back pain    Hard to sleep at night due to the pain, but he does sleep better with a special mattress instead of a regular mattress    He does have history of weakness; can be difficult at night for him to find and get into  Particular positions at night so as to avoid excessive pressure, pain, and ulcers at certain locations    Has groin hernia    Has not seen specialist for hernia    Patient does chronically use a walker        Objective    /81 (BP Location: Left arm, Patient Position: Chair, Cuff Size: Adult Small)   Pulse 72   Temp 98.4  F (36.9  C) (Oral)   Wt 60 kg (132 lb 5 oz)   SpO2 99%   BMI 23.44 kg/m    Body mass index is 23.44 kg/m .  Physical Exam  Constitutional:       Appearance: He is well-developed.   HENT:      Head: Normocephalic and atraumatic.   Eyes:      Conjunctiva/sclera: Conjunctivae normal.   Neck:      Vascular: No carotid bruit.   Cardiovascular:      Rate and Rhythm: Normal rate and regular rhythm.      Heart sounds: Normal heart sounds.   Pulmonary:      Effort: Pulmonary effort is normal. No respiratory distress.      Breath sounds: Normal breath sounds.   Neurological:      Mental Status: He is alert and oriented to person, place, and time.      Cranial Nerves: No cranial nerve deficit.   Psychiatric:         Speech: Speech normal.         Behavior: Behavior normal.         patient has small right inguinal hernia; tender on palpation    Strength okay in upper and lower extremities    Mild symmetric  pitting edema bilat pretibial                  ASSESSMENT / PLAN:  (M54.9,  G89.29) Chronic back pain, unspecified back location, unspecified back pain laterality  (primary encounter diagnosis)  Comment: patient needs new hospital mattress.  Completed paperwork.    Plan:as above     (R53.1) Weakness  Comment: this also contributes to need for hospital mattress  Plan: as above     (E78.00) Pure hypercholesterolemia  Comment: refill   Plan: pravastatin (PRAVACHOL) 40 MG tablet             (D64.9) Anemia, unspecified type  Comment: do lab tests; not on iron currently   Plan: CBC with platelets differential, Vitamin B12,         Ferritin, Folate             (R73.01) Impaired fasting glucose  Comment: check for diab   Plan: Hemoglobin A1c             (R53.83) Fatigue, unspecified type  Comment: check  Plan: Comprehensive metabolic panel             Right inguinal hernia; did referral for gen surger     Right shoulder pain: patient to follow up with surgeon      I reviewed the patient's medications, allergies, medical history, family history, and social history.    Dayday Alberts MD

## 2020-12-12 LAB — FOLATE SERPL-MCNC: 50.6 NG/ML

## 2020-12-13 NOTE — RESULT ENCOUNTER NOTE
The diabetes test ( hemoglobin a1c ) is normal.  You do not have diabetes.    The red blood count ( hemoglobin ) is still low.  Advise taking one over the counter iron pill daily ( watch for constipation) and then we could recheck this in 3-4 months.    Other labs are okay/ stable.    Dayday Alberts MD

## 2020-12-14 ENCOUNTER — MEDICAL CORRESPONDENCE (OUTPATIENT)
Dept: HEALTH INFORMATION MANAGEMENT | Facility: CLINIC | Age: 80
End: 2020-12-14

## 2021-01-25 ENCOUNTER — PATIENT OUTREACH (OUTPATIENT)
Dept: GERIATRIC MEDICINE | Facility: CLINIC | Age: 81
End: 2021-01-25

## 2021-01-25 NOTE — PROGRESS NOTES
[facility] Care Coordination Contact    Care coordinator received email from homemaking agency that was on hold at member's request.  Member has not followed through with rescheduling his surgery for his shoulder and said he would be interested in a homemaker if he had the surgery.  Call to member and left message asking if he would like me to cancel that referral or to follow through on finding a homaking.  Left VM.  Lupe Hyman RN  [facility]   [phone]

## 2021-01-28 ENCOUNTER — TELEPHONE (OUTPATIENT)
Dept: OPHTHALMOLOGY | Facility: CLINIC | Age: 81
End: 2021-01-28

## 2021-01-28 NOTE — TELEPHONE ENCOUNTER
Bhaskar Ott [phone]    Left message at 1559    Last visit in January 2020    Reviewed would need repeat exam in clinic and would review scheduling cataract surgery following visit    Main scheduling number provided for scheduling yearly exam/catarct with Dr. Arti Cornelius, RN 4:00 PM 01/28/21           Health Call Center    Phone Message    May a detailed message be left on voicemail: yes     Reason for Call: Other:   Pt calling to inquire about getting back on Arti's surgery schedule. Pt's surgery last year (07/2020) was canceled. Please follow-up with pt to discuss next steps.     Action Taken: Other:  eye      Travel Screening: Not Applicable

## 2021-02-04 DIAGNOSIS — N32.81 OVERACTIVE BLADDER: ICD-10-CM

## 2021-02-04 DIAGNOSIS — N40.1 BENIGN NON-NODULAR PROSTATIC HYPERPLASIA WITH LOWER URINARY TRACT SYMPTOMS: ICD-10-CM

## 2021-02-04 DIAGNOSIS — C61 PROSTATE CANCER (H): ICD-10-CM

## 2021-02-04 RX ORDER — TAMSULOSIN HYDROCHLORIDE 0.4 MG/1
0.4 CAPSULE ORAL 2 TIMES DAILY
Qty: 180 CAPSULE | Refills: 0 | Status: SHIPPED | OUTPATIENT
Start: 2021-02-04 | End: 2021-04-18

## 2021-02-04 RX ORDER — OXYBUTYNIN CHLORIDE 5 MG/1
5 TABLET ORAL 3 TIMES DAILY
Qty: 270 TABLET | Refills: 0 | Status: SHIPPED | OUTPATIENT
Start: 2021-02-04 | End: 2021-04-18

## 2021-02-11 NOTE — PROGRESS NOTES
Per Brennan at [facility], dme was delivered.  Bhaskar Ott 1940 new mattress/removal of old mattress 12/23/2020 SCHEDULING     Ruth Solares  Care Management Specialist  [facility]  [phone]

## 2021-03-01 ENCOUNTER — TELEPHONE (OUTPATIENT)
Dept: FAMILY MEDICINE | Facility: CLINIC | Age: 81
End: 2021-03-01

## 2021-03-01 NOTE — TELEPHONE ENCOUNTER
Reason for Call:  Other call back    Detailed comments: Dr Tapia calling states he would like to talk with Dr Alberts regarding patient. They have not been able to get in touch with the patient and would like to see if Dr Alberts has any suggestions? Please call. Thank you     Phone Number Patient can be reached at: Other phone number:    Dr. Alex Tapia (Provider) [phone]         Best Time: ASAP    Can we leave a detailed message on this number? YES    Call taken on 3/1/2021 at 9:13 AM by Leona Andrews

## 2021-03-05 ENCOUNTER — OFFICE VISIT (OUTPATIENT)
Dept: FAMILY MEDICINE | Facility: CLINIC | Age: 81
End: 2021-03-05
Payer: COMMERCIAL

## 2021-03-05 VITALS
BODY MASS INDEX: 22.24 KG/M2 | WEIGHT: 125.5 LBS | HEART RATE: 70 BPM | DIASTOLIC BLOOD PRESSURE: 69 MMHG | TEMPERATURE: 97.4 F | HEIGHT: 63 IN | SYSTOLIC BLOOD PRESSURE: 125 MMHG

## 2021-03-05 DIAGNOSIS — R10.84 ABDOMINAL PAIN, GENERALIZED: ICD-10-CM

## 2021-03-05 DIAGNOSIS — D64.9 ANEMIA, UNSPECIFIED TYPE: ICD-10-CM

## 2021-03-05 DIAGNOSIS — E78.00 PURE HYPERCHOLESTEROLEMIA: ICD-10-CM

## 2021-03-05 DIAGNOSIS — K40.90 INGUINAL HERNIA WITHOUT OBSTRUCTION OR GANGRENE, RECURRENCE NOT SPECIFIED, UNSPECIFIED LATERALITY: Primary | ICD-10-CM

## 2021-03-05 DIAGNOSIS — Z87.09 HISTORY OF COPD: ICD-10-CM

## 2021-03-05 DIAGNOSIS — R53.83 FATIGUE, UNSPECIFIED TYPE: ICD-10-CM

## 2021-03-05 DIAGNOSIS — E55.9 VITAMIN D DEFICIENCY DISEASE: ICD-10-CM

## 2021-03-05 DIAGNOSIS — R73.01 IMPAIRED FASTING GLUCOSE: ICD-10-CM

## 2021-03-05 DIAGNOSIS — M25.511 RIGHT SHOULDER PAIN, UNSPECIFIED CHRONICITY: ICD-10-CM

## 2021-03-05 LAB
BASOPHILS # BLD AUTO: 0 10E9/L (ref 0–0.2)
BASOPHILS NFR BLD AUTO: 0.5 %
DIFFERENTIAL METHOD BLD: ABNORMAL
EOSINOPHIL # BLD AUTO: 0.1 10E9/L (ref 0–0.7)
EOSINOPHIL NFR BLD AUTO: 2 %
ERYTHROCYTE [DISTWIDTH] IN BLOOD BY AUTOMATED COUNT: 13.3 % (ref 10–15)
HBA1C MFR BLD: 5.4 % (ref 0–5.6)
HCT VFR BLD AUTO: 34.6 % (ref 40–53)
HGB BLD-MCNC: 11.6 G/DL (ref 13.3–17.7)
LYMPHOCYTES # BLD AUTO: 1.1 10E9/L (ref 0.8–5.3)
LYMPHOCYTES NFR BLD AUTO: 18.3 %
MCH RBC QN AUTO: 31.1 PG (ref 26.5–33)
MCHC RBC AUTO-ENTMCNC: 33.5 G/DL (ref 31.5–36.5)
MCV RBC AUTO: 93 FL (ref 78–100)
MONOCYTES # BLD AUTO: 0.3 10E9/L (ref 0–1.3)
MONOCYTES NFR BLD AUTO: 4.8 %
NEUTROPHILS # BLD AUTO: 4.5 10E9/L (ref 1.6–8.3)
NEUTROPHILS NFR BLD AUTO: 74.4 %
PLATELET # BLD AUTO: 235 10E9/L (ref 150–450)
RBC # BLD AUTO: 3.73 10E12/L (ref 4.4–5.9)
TSH SERPL DL<=0.005 MIU/L-ACNC: 0.78 MU/L (ref 0.4–4)
WBC # BLD AUTO: 6.1 10E9/L (ref 4–11)

## 2021-03-05 PROCEDURE — 36415 COLL VENOUS BLD VENIPUNCTURE: CPT | Performed by: FAMILY MEDICINE

## 2021-03-05 PROCEDURE — 82306 VITAMIN D 25 HYDROXY: CPT | Performed by: FAMILY MEDICINE

## 2021-03-05 PROCEDURE — 83036 HEMOGLOBIN GLYCOSYLATED A1C: CPT | Performed by: FAMILY MEDICINE

## 2021-03-05 PROCEDURE — 86256 FLUORESCENT ANTIBODY TITER: CPT | Mod: 90 | Performed by: FAMILY MEDICINE

## 2021-03-05 PROCEDURE — 99000 SPECIMEN HANDLING OFFICE-LAB: CPT | Performed by: FAMILY MEDICINE

## 2021-03-05 PROCEDURE — 84443 ASSAY THYROID STIM HORMONE: CPT | Performed by: FAMILY MEDICINE

## 2021-03-05 PROCEDURE — 85025 COMPLETE CBC W/AUTO DIFF WBC: CPT | Performed by: FAMILY MEDICINE

## 2021-03-05 PROCEDURE — 99214 OFFICE O/P EST MOD 30 MIN: CPT | Performed by: FAMILY MEDICINE

## 2021-03-05 PROCEDURE — 83516 IMMUNOASSAY NONANTIBODY: CPT | Performed by: FAMILY MEDICINE

## 2021-03-05 RX ORDER — PRAVASTATIN SODIUM 40 MG
40 TABLET ORAL DAILY
Qty: 90 TABLET | Refills: 1 | Status: SHIPPED | OUTPATIENT
Start: 2021-03-05 | End: 2021-05-05

## 2021-03-05 ASSESSMENT — MIFFLIN-ST. JEOR: SCORE: 1174.39

## 2021-03-05 ASSESSMENT — PAIN SCALES - GENERAL: PAINLEVEL: NO PAIN (0)

## 2021-03-05 NOTE — LETTER
March 9, 2021    Bhaskar Ott  [address]   [facility] [address]          Dear ,    We are writing to inform you of your test results.  Your vitamin D level is too high.  Stop taking the over the counter vitamin D and then we can recheck in 3-4 months.     Normal hemoglobin a1c means you do not have diabetes.     The tests for celiac are normal.  You do not have celiac disease.     Other labs okay/ stable.       Resulted Orders   CBC with platelets differential   Result Value Ref Range    WBC 6.1 4.0 - 11.0 10e9/L    RBC Count 3.73 (L) 4.4 - 5.9 10e12/L    Hemoglobin 11.6 (L) 13.3 - 17.7 g/dL    Hematocrit 34.6 (L) 40.0 - 53.0 %    MCV 93 78 - 100 fl    MCH 31.1 26.5 - 33.0 pg    MCHC 33.5 31.5 - 36.5 g/dL    RDW 13.3 10.0 - 15.0 %    Platelet Count 235 150 - 450 10e9/L    Diff Method Automated Method     % Neutrophils 74.4 %    % Lymphocytes 18.3 %    % Monocytes 4.8 %    % Eosinophils 2.0 %    % Basophils 0.5 %    Absolute Neutrophil 4.5 1.6 - 8.3 10e9/L    Absolute Lymphocytes 1.1 0.8 - 5.3 10e9/L    Absolute Monocytes 0.3 0.0 - 1.3 10e9/L    Absolute Eosinophils 0.1 0.0 - 0.7 10e9/L    Absolute Basophils 0.0 0.0 - 0.2 10e9/L   TSH   Result Value Ref Range    TSH 0.78 0.40 - 4.00 mU/L   Hemoglobin A1c   Result Value Ref Range    Hemoglobin A1C 5.4 0 - 5.6 %      Comment:      Normal <5.7% Prediabetes 5.7-6.4%  Diabetes 6.5% or higher - adopted from ADA   consensus guidelines.     Vitamin D Deficiency   Result Value Ref Range    Vitamin D Deficiency screening 111 (H) 20 - 75 ug/L      Comment:      Season, race, dietary intake, and treatment affect the concentration of   25-hydroxy-Vitamin D. Values may decrease during winter months and increase   during summer months. Values 20-29 ug/L may indicate Vitamin D insufficiency   and values <20 ug/L may indicate Vitamin D deficiency.  Vitamin D determination is routinely performed by an immunoassay specific for   25 hydroxyvitamin D3.   If an individual is on vitamin D2 (ergocalciferol)   supplementation, please specify 25 OH vitamin D2 and D3 level determination by   LCMSMS test VITD23.     Tissue transglutaminase antibody IgA   Result Value Ref Range    Tissue Transglutaminase Antibody IgA 1 <7 U/mL      Comment:      Negative  The tTG-IgA assay has limited utility for patients with decreased levels of   IgA. Screening for celiac disease should include IgA testing to rule out   selective IgA deficiency and to guide selection and interpretation of   serological testing. tTG-IgG testing may be positive in celiac disease   patients with IgA deficiency.     Endomysial Antibody IgA by IFA   Result Value Ref Range    Endomysial Antibody IgA by IFA <1:10 <1:10      Comment:      (Note)  INTERPRETIVE INFORMATION: Endomysial Antibody, IgA Titer  The endomysial antigen has been identified as the protein   cross-linking enzyme known as tissue transglutaminase.  Performed By: [facility]  [address] [place] [id number]  : Dayna Donohue MD         If you have any questions or concerns, please call the clinic at the number listed above.       Sincerely,      Dayday Alberts MD

## 2021-03-05 NOTE — PROGRESS NOTES
"         Rolando Muro is a 80 year old who presents for the following health issues     HPI       Discuss hernia and past procedures       Review of Systems   Had one procedure for hernia    6-24-14 inguinal hernia    Patient having symptoms now, hernia bothering    Has not had surgery on shoulder either    Patient would like hernia fixed first    Taking vitamin D 125 mg/ 500 international unit(s) D3    Not tired    Walking a lot with walker    Some ongoing leg issues    No scooter     Legs hurt a lot     Patient wondering about celiac          Objective    /69 (BP Location: Left arm, Patient Position: Chair, Cuff Size: Adult Regular)   Pulse 70   Temp 97.4  F (36.3  C) (Oral)   Ht 1.6 m (5' 3\")   Wt 56.9 kg (125 lb 8 oz)   BMI 22.23 kg/m    Body mass index is 22.23 kg/m .  Physical Exam  Constitutional:       Appearance: He is well-developed.   HENT:      Head: Normocephalic and atraumatic.   Eyes:      Conjunctiva/sclera: Conjunctivae normal.   Neck:      Vascular: No carotid bruit.   Cardiovascular:      Rate and Rhythm: Normal rate and regular rhythm.      Heart sounds: Normal heart sounds.   Pulmonary:      Effort: Pulmonary effort is normal. No respiratory distress.      Breath sounds: Normal breath sounds.   Neurological:      Mental Status: He is alert and oriented to person, place, and time.      Cranial Nerves: No cranial nerve deficit.   Psychiatric:         Speech: Speech normal.         Behavior: Behavior normal.           ASSESSMENT / PLAN:  (K40.90) Inguinal hernia without obstruction or gangrene, recurrence not specified, unspecified laterality  (primary encounter diagnosis)  Comment: redid referral.  Patient to schdule consult.  Plan: GENERAL SURG ADULT REFERRAL             (Z87.09) History of COPD  Comment: breathing stable  Plan: COPD ACTION PLAN         Only occasionally uses albuterol     (E78.00) Pure hypercholesterolemia  Comment: refill med  Plan: pravastatin (PRAVACHOL) 40 " MG tablet             (D64.9) Anemia, unspecified type  Comment: recheck this   Plan: of note, last time folate, ferritin, and b12 all okay     (R53.83) Fatigue, unspecified type  Comment: check   Plan: CBC with platelets differential, TSH             (R73.01) Impaired fasting glucose  Comment: recheck ; patient has been often concerned about diabetes  Plan: Hemoglobin A1c             (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    Dayday Alberts MD

## 2021-03-05 NOTE — LETTER
My COPD Action Plan     Name: Bhaskar Ott    YOB: 1940   Date: 3/5/2021    My doctor: Dayday Alberts MD   My clinic: [address] [address] [phone]  My Controller Medicine: none   Dose:       My Rescue Medicine: Albuterol (Proair/Ventolin/Proventil) inhaler   Dose: as needed     My Flare Up Medicine: none   Dose:       My COPD Severity: Mild = FeV1 > 80%      Use of Oxygen: Oxygen Not Prescribed      Make sure you've had your pneumonia   vaccines.          GREEN ZONE       Doing well today      Usual level of activity and exercise    Usual amount of cough and mucus    No shortness of breath    Usual level of health (thinking clearly, sleeping well, feel like eating) Actions:      Take daily medicines    Use oxygen as prescribed    Follow regular exercise and diet plan    Avoid cigarette smoke and other irritants that harm the lungs           YELLOW ZONE          Having a bad day or flare up      Short of breath more than usual    A lot more sputum (mucus) than usual    Sputum looks yellow, green, tan, brown or bloody    More coughing or wheezing    Fever or chills    Less energy; trouble completing activities    Trouble thinking or focusing    Using quick relief inhaler or nebulizer more often    Poor sleep; symptoms wake me up    Do not feel like eating Actions:      Get plenty of rest    Take daily medicines    Use quick relief inhaler every 4 hours    If you use oxygen, call you doctor to see if you should adjust your oxygen    Do breathing exercises or other things to help you relax    Let a loved one, friend or neighbor know you are feeling worse    Call your care team if you have 2 or more symptoms.  Start taking steroids or antibiotics if directed by your care team           RED ZONE       Need medical care now      Severe shortness of breath (feel you can't breathe)    Fever, chills    Not enough breath to do any  activity    Trouble coughing up mucus, walking or talking    Blood in mucus    Frequent coughing   Rescue medicines are not working    Not able to sleep because of breathing    Feel confused or drowsy    Chest pain    Actions:      Call your health care team.  If you cannot reach your care team, call 911 or go to the emergency room.        Annual Reminders:  Meet with Care Team, Flu Shot every Fall  Pharmacy:    [facility] - [place], [place] - [address]  [id number] - [place], [place] - [address]  [facility] [address] - [place], [place] - [address]  [facility] - [place], [place] - [address]

## 2021-03-07 LAB — ENDOMYSIUM IGA TITR SER IF: NORMAL {TITER}

## 2021-03-08 LAB
DEPRECATED CALCIDIOL+CALCIFEROL SERPL-MC: 111 UG/L (ref 20–75)
TTG IGA SER-ACNC: 1 U/ML

## 2021-03-09 ENCOUNTER — TELEPHONE (OUTPATIENT)
Dept: FAMILY MEDICINE | Facility: CLINIC | Age: 81
End: 2021-03-09

## 2021-03-09 DIAGNOSIS — Z98.1 HISTORY OF SPINAL FUSION: ICD-10-CM

## 2021-03-09 DIAGNOSIS — M48.00 SPINAL STENOSIS, UNSPECIFIED SPINAL REGION: ICD-10-CM

## 2021-03-09 DIAGNOSIS — R53.1 WEAKNESS: Primary | ICD-10-CM

## 2021-03-09 NOTE — RESULT ENCOUNTER NOTE
Your vitamin D level is too high.  Stop taking the over the counter vitamin D and then we can recheck in 3-4 months.    Normal hemoglobin a1c means you do not have diabetes.    The tests for celiac are normal.  You do not have celiac disease.    Other labs okay/ stable.    Dayday Alberts MD

## 2021-03-09 NOTE — TELEPHONE ENCOUNTER
Reason for call:  Other   Patient called regarding (reason for call): patient needs an order for his power wheelchair sent   Additional comments:     Please order a power wheelchair, Original order was on 8/13/2020    Wheelchair assessment has not been complete.    [address] [address] [phone] phone    [facility].  [address] [place] [id number]    Fax:  [phone]    Phone number to reach patient:  Cell number on file:    Telephone Information:   Mobile [phone] patient called from this number    Best Time:      Can we leave a detailed message on this number?  YES    Travel screening: Not Applicable       .dtdme

## 2021-03-09 NOTE — LETTER
[facility]  [address]  [place] [id number] [phone]          March 24, 2021    Bhaskar Ott                                                                                                                     [address]   [facility] [address]            Dear Bhaskar,    We have tried to reach you several times, but have been unable to do so.  Enclosed is a copy of your results.    Your vitamin D level is too high.  Stop taking the over the counter vitamin D, and then we can recheck in 3-4 months.     Normal hemoglobin A1c means you do not have diabetes.     The tests for celiac are normal.  You do not have celiac disease.     Other labs okay/stable.    Component      Latest Ref Rng & Units 3/5/2021   WBC      4.0 - 11.0 10e9/L 6.1   RBC Count      4.4 - 5.9 10e12/L 3.73 (L)   Hemoglobin      13.3 - 17.7 g/dL 11.6 (L)   Hematocrit      40.0 - 53.0 % 34.6 (L)   MCV      78 - 100 fl 93   MCH      26.5 - 33.0 pg 31.1   MCHC      31.5 - 36.5 g/dL 33.5   RDW      10.0 - 15.0 % 13.3   Platelet Count      150 - 450 10e9/L 235   Diff Method       Automated Method   % Neutrophils      % 74.4   % Lymphocytes      % 18.3   % Monocytes      % 4.8   % Eosinophils      % 2.0   % Basophils      % 0.5   Absolute Neutrophil      1.6 - 8.3 10e9/L 4.5   Absolute Lymphocytes      0.8 - 5.3 10e9/L 1.1   Absolute Monocytes      0.0 - 1.3 10e9/L 0.3   Absolute Eosinophils      0.0 - 0.7 10e9/L 0.1   Absolute Basophils      0.0 - 0.2 10e9/L 0.0   TSH      0.40 - 4.00 mU/L 0.78   Hemoglobin A1C      0 - 5.6 % 5.4   Vitamin D Deficiency screening      20 - 75 ug/L 111 (H)   Tissue Transglutaminase Antibody IgA      <7 U/mL 1   Endomysial Antibody IgA by IFA      <1:10 <1:10     Please feel free to contact myself or my nurse with any questions or concerns.    Sincerely,         Dayday Alberts MD/poornima

## 2021-03-09 NOTE — TELEPHONE ENCOUNTER
Reason for call:  Results   Name of test or procedure: labs  Date of test or procedure: 3/5/2021  Location of test or procedure: [facility]    Additional comments: Please contact Bhaskar @ [phone] to discuss this last lab results.    Can we leave a detailed message on this number?  Not Applicable    Travel screening: Not Applicable

## 2021-03-10 NOTE — TELEPHONE ENCOUNTER
Per result note-     Your vitamin D level is too high.  Stop taking the over the counter vitamin D and then we can recheck in 3-4 months.     Normal hemoglobin a1c means you do not have diabetes.     The tests for celiac are normal.  You do not have celiac disease.     Other labs okay/ stable.     Dayday Alberts MD    Left message on voicemail for patient to return call to the clinic at [phone]    Breanna Simon RN  [facility]

## 2021-03-10 NOTE — TELEPHONE ENCOUNTER
I printed DME prescription     Please mail to [facility]    Prescription in team akua Alberts MD

## 2021-03-10 NOTE — TELEPHONE ENCOUNTER
Electric wheelchair DME order printed and faxed to [facility] at [phone].  María Agrawal,

## 2021-03-11 ENCOUNTER — PATIENT OUTREACH (OUTPATIENT)
Dept: GERIATRIC MEDICINE | Facility: CLINIC | Age: 81
End: 2021-03-11

## 2021-03-11 NOTE — PROGRESS NOTES
[facility] Care Coordination Contact    Call to member and left voice mail to offer to assist member to schedule a Covid vaccine.  Left care coordinator cell number.    Lupe Hyman RN  [facility]   [phone]

## 2021-03-16 NOTE — TELEPHONE ENCOUNTER
Left message on answering machine for patient to call back to the nurse at [phone].    Citlali Pa RN  [facility]

## 2021-03-17 NOTE — TELEPHONE ENCOUNTER
Left message on answering machine for patient to call back to the nurse at [phone].    Citlali Pa RN  [facility]

## 2021-03-29 NOTE — TELEPHONE ENCOUNTER
Spoke with patient. He called to review result message. Notified him of result message per provider. Pt verbalized understanding and had no further questions. He states that he has stopped his OTC vit D supplement. He did receive the letter.

## 2021-03-30 ENCOUNTER — PATIENT OUTREACH (OUTPATIENT)
Dept: GERIATRIC MEDICINE | Facility: CLINIC | Age: 81
End: 2021-03-30

## 2021-03-30 NOTE — PROGRESS NOTES
[facility] Care Coordination Contact    Member is requesting a new wheelchair.  He will need to complete a seating eval before the chair can be approved.  Call to member 3/30/21 and left VM asking for a call back so we can get that seating eval appointment.  Also included in the VM was to offer member assist to set a Covid vaccine appointment.    Call again 3/31 with same message.    Lupe Hyman RN  [facility]   [phone]

## 2021-04-14 DIAGNOSIS — R06.00 DYSPNEA, UNSPECIFIED TYPE: ICD-10-CM

## 2021-04-15 ENCOUNTER — TELEPHONE (OUTPATIENT)
Dept: FAMILY MEDICINE | Facility: CLINIC | Age: 81
End: 2021-04-15

## 2021-04-15 NOTE — TELEPHONE ENCOUNTER
Left message on voicemail advising patient to return call at [phone].    Anai FLORESN, RN  [facility]

## 2021-04-15 NOTE — TELEPHONE ENCOUNTER
I will not provide any prescription pain meds for this patient.  If he desires pain meds he would need to find another pain clinic.    Please inform patient    Dayday Alberts MD

## 2021-04-15 NOTE — LETTER
April 22, 2021    Bhaskar Ott    [address]    [place] [address]        Dear Bhaskar,      We have tried multiple times to reach you but have been unsuccessful. Please call our clinic at phone [phone] as soon as possible to schedule an appointment with Dr. Alberts for a follow-up on your medications. This appointment is needed for any additional refills to be approved.      Sincerely,    Brenda Tran

## 2021-04-15 NOTE — TELEPHONE ENCOUNTER
Reason for Call:  Other prescription    Detailed comments: patient states that he received a letter of discharge from his pain clinic.  In the letter is states they would give him a one month taper for his medications and for him to contact his primary care provider.  He states that the talked to Dr. Chow yesterday and was told he could not get a prescription right now.  Patient would like to discuss with you.  Please call.      Phone Number Patient can be reached at: Home number on file [phone] (home)    Best Time: any    Can we leave a detailed message on this number? YES    Call taken on 4/15/2021 at 10:07 AM by Bart Pulliam

## 2021-04-16 RX ORDER — ALBUTEROL SULFATE 90 UG/1
AEROSOL, METERED RESPIRATORY (INHALATION)
Qty: 8.5 G | Refills: 3 | Status: SHIPPED | OUTPATIENT
Start: 2021-04-16 | End: 2022-08-22

## 2021-04-20 NOTE — TELEPHONE ENCOUNTER
Patient returned call but call dropped during transfer. I called patient at both numbers listed. Lvm on mobile. Unable to lvm on home number.     Citlali Pa RN

## 2021-04-22 NOTE — TELEPHONE ENCOUNTER
Attempt to contact patient x 3 without response.     TC/MA, please send letter.     Beatriz Spivey RN, BSN, PHN  [facility] [place]

## 2021-04-27 ENCOUNTER — PATIENT OUTREACH (OUTPATIENT)
Dept: GERIATRIC MEDICINE | Facility: CLINIC | Age: 81
End: 2021-04-27

## 2021-04-27 NOTE — PROGRESS NOTES
[facility] Care Coordination Contact    Call to member 4/25 and 4/26.  Left VM asking for member to call.    Call to member 4/30/21 and left VM asking for a call back to complete 6 mo telephone screen.  Call to member 5/1/21 and left VM asking for member to call back to complete 6 MONTH telephone screen.    Lupe Hyman RN  [facility]   [phone]

## 2021-05-04 ENCOUNTER — TELEPHONE (OUTPATIENT)
Dept: FAMILY MEDICINE | Facility: CLINIC | Age: 81
End: 2021-05-04

## 2021-05-04 DIAGNOSIS — E78.00 PURE HYPERCHOLESTEROLEMIA: ICD-10-CM

## 2021-05-04 NOTE — TELEPHONE ENCOUNTER
Reason for Call:  Other prescription    Detailed comments: Patient spoke with pharmacy and was told to call his provider for a refill of Pravastatin. Patient has 2 pills left. Please call.     Phone Number Patient can be reached at: [phone] or [phone]    Best Time: any    Can we leave a detailed message on this number? YES    Call taken on 5/4/2021 at 9:57 AM by Sylvie Martínez

## 2021-05-05 RX ORDER — PRAVASTATIN SODIUM 40 MG
40 TABLET ORAL DAILY
Qty: 90 TABLET | Refills: 1 | Status: SHIPPED | OUTPATIENT
Start: 2021-05-05 | End: 2021-05-20

## 2021-05-05 NOTE — TELEPHONE ENCOUNTER
Routing below message for Team RN to call patient.  RX was sent today.      Thank you,    Demond Nowak, RN

## 2021-05-17 DIAGNOSIS — E78.00 PURE HYPERCHOLESTEROLEMIA: ICD-10-CM

## 2021-05-20 RX ORDER — PRAVASTATIN SODIUM 40 MG
40 TABLET ORAL DAILY
Qty: 90 TABLET | Refills: 1 | Status: SHIPPED | OUTPATIENT
Start: 2021-05-20

## 2021-06-28 ENCOUNTER — PATIENT OUTREACH (OUTPATIENT)
Dept: GERIATRIC MEDICINE | Facility: CLINIC | Age: 81
End: 2021-06-28

## 2021-06-28 NOTE — PROGRESS NOTES
[facility] Care Coordination Contact    Member has two numbers in Epic, neither of which are working currently so could not complete 6 mo telephone screen.    Lupe Hyman RN  [facility]   [phone]

## 2021-06-29 ENCOUNTER — TELEPHONE (OUTPATIENT)
Dept: FAMILY MEDICINE | Facility: CLINIC | Age: 81
End: 2021-06-29

## 2021-06-29 NOTE — TELEPHONE ENCOUNTER
Reason for Call:  Other call back    Detailed comments: Pt states that he would just like to inform Dr Alberts that he has not been in for an appointment due to lack of transportation.    Phone Number Patient can be reached at: Cell number on file:    Telephone Information:   Mobile [phone]           Best Time: anytime    Can we leave a detailed message on this number? YES    Call taken on 6/29/2021 at 1:13 PM by Grace Torres

## 2021-07-12 ENCOUNTER — PATIENT OUTREACH (OUTPATIENT)
Dept: GERIATRIC MEDICINE | Facility: CLINIC | Age: 81
End: 2021-07-12

## 2021-07-12 NOTE — PROGRESS NOTES
[facility] Care Coordination Contact  Call again 7/12 to do 6 month telephone screen.    Member is in the ED now at [facility] for leg pain.

## 2021-07-12 NOTE — PROGRESS NOTES
"[facility] Care Coordination Contact      [facility] Six-Month Telephone Assessment    6 month telephone assessment completed on 7/11/21.    [facility] visits: Yes -  [facility]  Hospitalizations: No  TCU stays: No  Significant health status changes: none  Falls/Injuries: No  ADL/IADL changes: No  Changes in services: No    Caregiver Assessment follow up:  na    Goals: See POC in chart for goal progress documentation.  Member has reported that he cannot get to medical appointment due to lack of transportation.  Again, expressed that this care coordinator can help.  Member laughed ad said \"you know what happened last time\". Member had claimed that the ride wasn't there but transport company had recorded that it was there and waited a long time.  Again, told member that it isn't a perfect system but I would help set it up if he had a medical appointment.    Member did have his Covid immunizations.  Said he is in his apartment most of the time.  Has not seen an eye doctor or dentist.    Lupe Hyman RN  [facility]   [phone]    Will see member in 6 months for an annual health risk assessment.   Encouraged member to call CC with any questions or concerns in the meantime.           "

## 2021-07-26 ENCOUNTER — PATIENT OUTREACH (OUTPATIENT)
Dept: GERIATRIC MEDICINE | Facility: CLINIC | Age: 81
End: 2021-07-26

## 2021-08-01 DIAGNOSIS — N40.1 BENIGN NON-NODULAR PROSTATIC HYPERPLASIA WITH LOWER URINARY TRACT SYMPTOMS: ICD-10-CM

## 2021-08-02 DIAGNOSIS — I10 BENIGN ESSENTIAL HYPERTENSION: ICD-10-CM

## 2021-08-02 DIAGNOSIS — N40.1 BENIGN NON-NODULAR PROSTATIC HYPERPLASIA WITH LOWER URINARY TRACT SYMPTOMS: ICD-10-CM

## 2021-08-02 DIAGNOSIS — N32.81 OVERACTIVE BLADDER: ICD-10-CM

## 2021-08-02 DIAGNOSIS — E78.00 PURE HYPERCHOLESTEROLEMIA: ICD-10-CM

## 2021-08-02 DIAGNOSIS — G47.00 INSOMNIA, UNSPECIFIED TYPE: ICD-10-CM

## 2021-08-02 DIAGNOSIS — C61 PROSTATE CANCER (H): ICD-10-CM

## 2021-08-02 DIAGNOSIS — K21.9 GASTROESOPHAGEAL REFLUX DISEASE WITHOUT ESOPHAGITIS: ICD-10-CM

## 2021-08-02 RX ORDER — FINASTERIDE 5 MG/1
TABLET, FILM COATED ORAL
Qty: 90 TABLET | Refills: 3 | Status: SHIPPED | OUTPATIENT
Start: 2021-08-02

## 2021-08-03 RX ORDER — AMLODIPINE BESYLATE 5 MG/1
5 TABLET ORAL DAILY
Qty: 90 TABLET | Refills: 3 | OUTPATIENT
Start: 2021-08-03

## 2021-08-03 RX ORDER — PRAVASTATIN SODIUM 40 MG
40 TABLET ORAL DAILY
Qty: 90 TABLET | Refills: 1 | OUTPATIENT
Start: 2021-08-03

## 2021-08-03 RX ORDER — TAMSULOSIN HYDROCHLORIDE 0.4 MG/1
CAPSULE ORAL
Qty: 180 CAPSULE | Refills: 0 | Status: SHIPPED | OUTPATIENT
Start: 2021-08-03 | End: 2022-02-03

## 2021-08-03 RX ORDER — GABAPENTIN 100 MG/1
CAPSULE ORAL
Qty: 180 CAPSULE | Refills: 0 | Status: SHIPPED | OUTPATIENT
Start: 2021-08-03

## 2021-08-03 RX ORDER — OXYBUTYNIN CHLORIDE 5 MG/1
TABLET ORAL
Qty: 270 TABLET | Refills: 0 | Status: SHIPPED | OUTPATIENT
Start: 2021-08-03 | End: 2022-02-09

## 2021-08-03 NOTE — TELEPHONE ENCOUNTER
Routing refill request to provider for review/approval because:  Drug not on the [facility] refill protocol   Patient has Tadalafil, Vardenafil, or Sildenafil on their medication list  Had diagnosis of glaucoma           Pending Prescriptions:                       Disp   Refills    oxybutynin (DITROPAN) 5 MG tablet          270 ta*0        Sig: TAKE 1 TABLET THREE TIMES A DAY    gabapentin (NEURONTIN) 100 MG capsule      180 ca*1        Sig: TAKE 1 TO 2 CAPSULES AT BEDTIME AS NEEDED FOR SLEEP    tamsulosin (FLOMAX) 0.4 MG capsule         180 ca*0        Sig: TAKE 1 CAPSULE TWICE A DAY  Refused Prescriptions:                       Disp   Refills    omeprazole (PRILOSEC) 20 MG DR capsule     180 ca*3        Sig: TAKE 1 CAPSULE TWICE A DAY  Refused By: SUYAPA NOWAK  Reason for Refusal: Should already have refills on file    amLODIPine (NORVASC) 5 MG tablet           90 tab*3        Sig: Take 1 tablet (5 mg) by mouth daily  Refused By: SUYAPA NOWAK  Reason for Refusal: Should already have refills on file    pravastatin (PRAVACHOL) 40 MG tablet       90 tab*1        Sig: Take 1 tablet (40 mg) by mouth daily  Refused By: SUYAPA NOWAK  Reason for Refusal: Should already have refills on file        Demond Nowak RN

## 2021-08-06 DIAGNOSIS — I10 BENIGN ESSENTIAL HYPERTENSION: ICD-10-CM

## 2021-08-09 RX ORDER — AMLODIPINE BESYLATE 5 MG/1
5 TABLET ORAL DAILY
Qty: 90 TABLET | Refills: 3 | Status: SHIPPED | OUTPATIENT
Start: 2021-08-09

## 2021-08-20 NOTE — TELEPHONE ENCOUNTER
"Requested Prescriptions   Pending Prescriptions Disp Refills     omeprazole (PRILOSEC) 20 MG DR capsule 180 capsule 1    PPI Protocol Passed - 12/11/2018 11:49 AM       Passed - Not on Clopidogrel (unless Pantoprazole ordered)       Passed - No diagnosis of osteoporosis on record       Passed - Recent (12 mo) or future (30 days) visit within the authorizing provider's specialty    Patient had office visit in the last 12 months or has a visit in the next 30 days with authorizing provider or within the authorizing provider's specialty.  See \"Patient Info\" tab in inbasket, or \"Choose Columns\" in Meds & Orders section of the refill encounter.             Passed - Patient is age 18 or older          "
Prescription approved per [facility] Refill Protocol.    Prerna Alfonso RN    
Requested Prescriptions   Pending Prescriptions Disp Refills     omeprazole (PRILOSEC) 20 MG DR capsule 180 capsule 1    There is no refill protocol information for this order   Last Written Prescription Date:  6/19/18  Last Fill Quantity: 180,  # refills: 1   Last office visit: 10/26/2018 with prescribing provider:     Future Office Visit:           
Patient developed a fever last night, took Tylenol without relief.  En route to hospital, she had 1 episode of vomiting.

## 2021-09-01 ENCOUNTER — PATIENT OUTREACH (OUTPATIENT)
Dept: GERIATRIC MEDICINE | Facility: CLINIC | Age: 81
End: 2021-09-01

## 2021-09-01 NOTE — PROGRESS NOTES
"Call to two number that member seems to be active  Called 8/30/21 9/1/21  Member returned call as requested but refused to talk and said \"'I'm busy ok?\" 3 times and then said don't bother me again.  Per insurance I will \"bother \" him again.    Call to member on 8/7/21 and left VM.  Call to member on 8/8/21 and member said \"What is your problem, I no longer want to deal with you, okay? Okay?  Member then hung up.    Lupe Hyman RN  [employer] [facility]   [phone]  "

## 2021-09-16 ENCOUNTER — PATIENT OUTREACH (OUTPATIENT)
Dept: GERIATRIC MEDICINE | Facility: CLINIC | Age: 81
End: 2021-09-16

## 2021-09-16 NOTE — PROGRESS NOTES
[facility] Care Coordination Contact    Call to  [facility] to cancel member's Lifeline since he is refusing an annual health risk screening.  His EW will not close because of Covid but his EW services will also not be funding by health [facility].   [employer] reports that member canceled his lifeline one month after having it. Cancelled 12/7/20.    Lupe Hyman RN  [facility]   [phone]

## 2021-09-17 DIAGNOSIS — N32.81 OVERACTIVE BLADDER: ICD-10-CM

## 2021-09-17 DIAGNOSIS — G47.00 INSOMNIA, UNSPECIFIED TYPE: ICD-10-CM

## 2021-09-17 RX ORDER — GABAPENTIN 100 MG/1
CAPSULE ORAL
Qty: 180 CAPSULE | Refills: 0 | Status: CANCELLED | OUTPATIENT
Start: 2021-09-17

## 2021-09-17 RX ORDER — OXYBUTYNIN CHLORIDE 5 MG/1
TABLET ORAL
Qty: 270 TABLET | Refills: 0 | Status: CANCELLED | OUTPATIENT
Start: 2021-09-17

## 2021-09-20 NOTE — TELEPHONE ENCOUNTER
"Routing refill request to provider for review/approval because:  Gabapentin not on refill protocol   Requested Prescriptions   Pending Prescriptions Disp Refills     gabapentin (NEURONTIN) 100 MG capsule 180 capsule 0     Sig: TAKE 1 TO 2 CAPSULES AT BEDTIME AS NEEDED FOR SLEEP       There is no refill protocol information for this order        oxybutynin (DITROPAN) 5 MG tablet 270 tablet 0     Sig: TAKE 1 TABLET THREE TIMES A DAY       Muscarinic Antagonists (Urinary Incontinence Agents) Failed - 9/17/2021 11:23 AM        Failed - Patient does not have a diagnosis of glaucoma on the problem list     If glaucoma diagnosis is new, refer refill to physician.          Passed - Recent (12 mo) or future (30 days) visit within the authorizing provider's specialty     Patient has had an office visit with the authorizing provider or a provider within the authorizing providers department within the previous 12 mos or has a future within next 30 days. See \"Patient Info\" tab in inbasket, or \"Choose Columns\" in Meds & Orders section of the refill encounter.              Passed - Medication is Oxybutynin and patient is 5 years of age or older        Passed - Medication is active on med list        Passed - Patient is 18 years of age or older           Suzanne Cotton RN        "

## 2021-10-19 ENCOUNTER — PATIENT OUTREACH (OUTPATIENT)
Dept: GERIATRIC MEDICINE | Facility: CLINIC | Age: 81
End: 2021-10-19

## 2021-10-26 ENCOUNTER — PATIENT OUTREACH (OUTPATIENT)
Dept: GERIATRIC MEDICINE | Facility: CLINIC | Age: 81
End: 2021-10-26

## 2021-10-26 NOTE — PROGRESS NOTES
[facility] Care Coordination Contact    No longer active with [facility] community case management effective 11/1/21.  Reason for community disenrollment: Change Care System/PCC to  [facility]   Multiple attempts have been made to confirm with member that this is what he wants to do but he does not answer his phone.  Had spoken with Miroslava, the care coordinator at [facility] who reports that member has confirmed with her that this is where he wants to receive services. UTF started.

## 2021-10-27 ENCOUNTER — MEDICAL CORRESPONDENCE (OUTPATIENT)
Dept: HEALTH INFORMATION MANAGEMENT | Facility: CLINIC | Age: 81
End: 2021-10-27
Payer: COMMERCIAL

## 2021-12-15 NOTE — TELEPHONE ENCOUNTER
Reason for Call:  Medication or medication refill:    Do you use a [facility]?  Name of the pharmacy and phone number for the current request:    [facility] - [place], [place] - [address] [place] [address]        Name of the medication requested: albuterol (PROAIR HFA) 108 (90 Base) MCG/ACT inhaler     prednisoLONE acetate (PRED FORTE) 1 % ophthalmic suspension     Other request: Patient requesting to be tested for covid-19 non symptomatic again.   Contact patient back if request is possible.    Can we leave a detailed message on this number? YES    Phone number patient can be reached at: Cell number on file:    Telephone Information:   Mobile [phone]       Best Time: any     Call taken on 7/31/2020 at 8:38 AM by Karen Watkins       No

## 2022-01-16 DIAGNOSIS — K21.9 GASTROESOPHAGEAL REFLUX DISEASE WITHOUT ESOPHAGITIS: ICD-10-CM

## 2022-02-08 DIAGNOSIS — N32.81 OVERACTIVE BLADDER: ICD-10-CM

## 2022-02-09 RX ORDER — OXYBUTYNIN CHLORIDE 5 MG/1
TABLET ORAL
Qty: 270 TABLET | Refills: 0 | Status: SHIPPED | OUTPATIENT
Start: 2022-02-09

## 2022-02-09 NOTE — TELEPHONE ENCOUNTER
Prescription approved per [facility] Refill Protocol.    Niya Edwards RN  [facility]

## 2022-02-15 DIAGNOSIS — Z11.59 ENCOUNTER FOR SCREENING FOR OTHER VIRAL DISEASES: Primary | ICD-10-CM

## 2022-02-17 PROBLEM — Z76.89 HEALTH CARE HOME: Status: RESOLVED | Noted: 2017-12-12 | Resolved: 2021-11-03

## 2022-03-07 ENCOUNTER — TELEPHONE (OUTPATIENT)
Dept: FAMILY MEDICINE | Facility: CLINIC | Age: 82
End: 2022-03-07
Payer: COMMERCIAL

## 2022-03-07 ENCOUNTER — TELEPHONE (OUTPATIENT)
Dept: OTHER | Facility: CLINIC | Age: 82
End: 2022-03-07
Payer: COMMERCIAL

## 2022-03-07 DIAGNOSIS — Z71.89 COORDINATION OF COMPLEX CARE: ICD-10-CM

## 2022-03-07 DIAGNOSIS — Z99.89 DEPENDENT ON WALKER FOR AMBULATION: ICD-10-CM

## 2022-03-07 DIAGNOSIS — Z98.890 POSTOPERATIVE STATE: Primary | ICD-10-CM

## 2022-03-07 NOTE — TELEPHONE ENCOUNTER
Called patient back.    He is has surgery 3/17/22, RIGHT REVERSE SHOULDR ARTHROPLASTY WITH CUSTOM GUIDE.    Assisted with scheduling a pre-op physical appointment 3/9/22.     Patient has been receive health care services from [facility] ([facility]    Patient verbalized understanding and has no further questions or concerns at this time.

## 2022-03-07 NOTE — TELEPHONE ENCOUNTER
Reason for Call:  Other call back    Detailed comments: patient is requesting a call back from one of the staff working with Dr Alberts before his upcoming surgery on 3/17/22    Phone Number Patient can be reached at: Home number on file [phone] (home)    Best Time: anytime    Can we leave a detailed message on this number? YES    Call taken on 3/7/2022 at 1:55 PM by Ita Pa

## 2022-03-09 ENCOUNTER — PATIENT OUTREACH (OUTPATIENT)
Dept: CARE COORDINATION | Facility: CLINIC | Age: 82
End: 2022-03-09
Payer: COMMERCIAL

## 2022-03-09 NOTE — PROGRESS NOTES
Clinic Care Coordination Contact  [facility]      Clinical Data: Care Coordinator Outreach  Outreach attempted x 1.  Number not operable.  Outreach attempted X2.  Second number has no VM.  Outreach attempted X3.  Third number has no VM.  Plan: Care Coordinator will not send an introductory letter via mail. Care Coordinator will try to reach patient again in 1-2 business days.    This patient was a part of the [facility] network and then he quit [facility].      This is a referral request from [facility] in order to have a TCU in place in time for the patient to be discharged.  The patient had made a pre-op appointment with Dr. Alberts at the [facility] for today.  The patient cancelled the appointment today prior to being seen.    This will be forwarded to the [facility].        Kobi Purdy MSN, RN, PHN, [facility]   Primary Care Clinical RN Care Coordinator  [facility]  3/9/2022   3:03 PM  [email or web address]  Office: [phone]

## 2022-03-10 NOTE — PROGRESS NOTES
Clinic Care Coordination Contact  [facility]       Clinical Data: Care Coordinator Outreach  Outreach attempted x 1. Not an accurate number.  Outreach attempted X 2 no VM set up  Outreach attempted X 3 no VM set up.  Plan: Care Coordinator will not send a letter.   Care Coordinator will do no further outreaches at this time.            Kobi Purdy MSN, RN, PHN, [facility]   Primary Care Clinical RN Care Coordinator  [facility]  3/10/2022   11:16 AM  [email or web address]  Office: [phone]

## 2022-03-17 ENCOUNTER — APPOINTMENT (OUTPATIENT)
Dept: GENERAL RADIOLOGY | Facility: CLINIC | Age: 82
DRG: 483 | End: 2022-03-17
Attending: STUDENT IN AN ORGANIZED HEALTH CARE EDUCATION/TRAINING PROGRAM
Payer: COMMERCIAL

## 2022-03-17 ENCOUNTER — HOSPITAL ENCOUNTER (INPATIENT)
Facility: CLINIC | Age: 82
LOS: 4 days | Discharge: NURSING, REHAB OR LONG-TERM CARE | DRG: 483 | End: 2022-03-21
Attending: ORTHOPAEDIC SURGERY | Admitting: ORTHOPAEDIC SURGERY
Payer: COMMERCIAL

## 2022-03-17 ENCOUNTER — ANESTHESIA (OUTPATIENT)
Dept: SURGERY | Facility: CLINIC | Age: 82
DRG: 483 | End: 2022-03-17
Payer: COMMERCIAL

## 2022-03-17 ENCOUNTER — ANESTHESIA EVENT (OUTPATIENT)
Dept: SURGERY | Facility: CLINIC | Age: 82
DRG: 483 | End: 2022-03-17
Payer: COMMERCIAL

## 2022-03-17 ENCOUNTER — APPOINTMENT (OUTPATIENT)
Dept: ULTRASOUND IMAGING | Facility: CLINIC | Age: 82
DRG: 483 | End: 2022-03-17
Attending: PHYSICIAN ASSISTANT
Payer: COMMERCIAL

## 2022-03-17 DIAGNOSIS — Z98.890 POSTOPERATIVE STATE: Primary | ICD-10-CM

## 2022-03-17 LAB
ALBUMIN SERPL-MCNC: 3.4 G/DL (ref 3.4–5)
ALP SERPL-CCNC: 81 U/L (ref 40–150)
ALT SERPL W P-5'-P-CCNC: 30 U/L (ref 0–70)
ANION GAP SERPL CALCULATED.3IONS-SCNC: 6 MMOL/L (ref 3–14)
AST SERPL W P-5'-P-CCNC: 35 U/L (ref 0–45)
BILIRUB SERPL-MCNC: 0.4 MG/DL (ref 0.2–1.3)
BUN SERPL-MCNC: 30 MG/DL (ref 7–30)
CALCIUM SERPL-MCNC: 8.8 MG/DL (ref 8.5–10.1)
CHLORIDE BLD-SCNC: 105 MMOL/L (ref 94–109)
CO2 SERPL-SCNC: 26 MMOL/L (ref 20–32)
CREAT SERPL-MCNC: 0.98 MG/DL (ref 0.66–1.25)
GFR SERPL CREATININE-BSD FRML MDRD: 77 ML/MIN/1.73M2
GLUCOSE BLD-MCNC: 146 MG/DL (ref 70–99)
POTASSIUM BLD-SCNC: 4.1 MMOL/L (ref 3.4–5.3)
POTASSIUM BLD-SCNC: 4.4 MMOL/L (ref 3.4–5.3)
PROT SERPL-MCNC: 6.7 G/DL (ref 6.8–8.8)
SODIUM SERPL-SCNC: 137 MMOL/L (ref 133–144)

## 2022-03-17 PROCEDURE — 250N000025 HC SEVOFLURANE, PER MIN: Performed by: ORTHOPAEDIC SURGERY

## 2022-03-17 PROCEDURE — 99222 1ST HOSP IP/OBS MODERATE 55: CPT | Performed by: PHYSICIAN ASSISTANT

## 2022-03-17 PROCEDURE — C1776 JOINT DEVICE (IMPLANTABLE): HCPCS | Performed by: ORTHOPAEDIC SURGERY

## 2022-03-17 PROCEDURE — 258N000001 HC RX 258: Performed by: ORTHOPAEDIC SURGERY

## 2022-03-17 PROCEDURE — 250N000013 HC RX MED GY IP 250 OP 250 PS 637: Performed by: ORTHOPAEDIC SURGERY

## 2022-03-17 PROCEDURE — 370N000017 HC ANESTHESIA TECHNICAL FEE, PER MIN: Performed by: ORTHOPAEDIC SURGERY

## 2022-03-17 PROCEDURE — 258N000003 HC RX IP 258 OP 636: Performed by: NURSE ANESTHETIST, CERTIFIED REGISTERED

## 2022-03-17 PROCEDURE — 250N000013 HC RX MED GY IP 250 OP 250 PS 637: Performed by: PHYSICIAN ASSISTANT

## 2022-03-17 PROCEDURE — 360N000077 HC SURGERY LEVEL 4, PER MIN: Performed by: ORTHOPAEDIC SURGERY

## 2022-03-17 PROCEDURE — 250N000009 HC RX 250: Performed by: NURSE ANESTHETIST, CERTIFIED REGISTERED

## 2022-03-17 PROCEDURE — 36415 COLL VENOUS BLD VENIPUNCTURE: CPT | Performed by: ANESTHESIOLOGY

## 2022-03-17 PROCEDURE — 250N000011 HC RX IP 250 OP 636: Performed by: STUDENT IN AN ORGANIZED HEALTH CARE EDUCATION/TRAINING PROGRAM

## 2022-03-17 PROCEDURE — 120N000001 HC R&B MED SURG/OB

## 2022-03-17 PROCEDURE — 250N000009 HC RX 250: Performed by: ANESTHESIOLOGY

## 2022-03-17 PROCEDURE — 76700 US EXAM ABDOM COMPLETE: CPT

## 2022-03-17 PROCEDURE — 999N000063 XR SHOULDER RIGHT PORT G/E 2 VIEWS: Mod: RT

## 2022-03-17 PROCEDURE — C1713 ANCHOR/SCREW BN/BN,TIS/BN: HCPCS | Performed by: ORTHOPAEDIC SURGERY

## 2022-03-17 PROCEDURE — 36415 COLL VENOUS BLD VENIPUNCTURE: CPT | Performed by: PHYSICIAN ASSISTANT

## 2022-03-17 PROCEDURE — 999N000141 HC STATISTIC PRE-PROCEDURE NURSING ASSESSMENT: Performed by: ORTHOPAEDIC SURGERY

## 2022-03-17 PROCEDURE — 258N000003 HC RX IP 258 OP 636: Performed by: STUDENT IN AN ORGANIZED HEALTH CARE EDUCATION/TRAINING PROGRAM

## 2022-03-17 PROCEDURE — 84132 ASSAY OF SERUM POTASSIUM: CPT | Performed by: PHYSICIAN ASSISTANT

## 2022-03-17 PROCEDURE — 99207 PR CONSULT E&M CHANGED TO INITIAL LEVEL: CPT | Performed by: PHYSICIAN ASSISTANT

## 2022-03-17 PROCEDURE — 250N000013 HC RX MED GY IP 250 OP 250 PS 637: Performed by: STUDENT IN AN ORGANIZED HEALTH CARE EDUCATION/TRAINING PROGRAM

## 2022-03-17 PROCEDURE — 0RRJ00Z REPLACEMENT OF RIGHT SHOULDER JOINT WITH REVERSE BALL AND SOCKET SYNTHETIC SUBSTITUTE, OPEN APPROACH: ICD-10-PCS | Performed by: ORTHOPAEDIC SURGERY

## 2022-03-17 PROCEDURE — 250N000011 HC RX IP 250 OP 636: Performed by: NURSE ANESTHETIST, CERTIFIED REGISTERED

## 2022-03-17 PROCEDURE — 250N000011 HC RX IP 250 OP 636: Performed by: ANESTHESIOLOGY

## 2022-03-17 PROCEDURE — 84132 ASSAY OF SERUM POTASSIUM: CPT | Performed by: ANESTHESIOLOGY

## 2022-03-17 PROCEDURE — 272N000002 HC OR SUPPLY OTHER OPNP: Performed by: ORTHOPAEDIC SURGERY

## 2022-03-17 PROCEDURE — 250N000011 HC RX IP 250 OP 636: Performed by: ORTHOPAEDIC SURGERY

## 2022-03-17 PROCEDURE — 258N000003 HC RX IP 258 OP 636: Performed by: ANESTHESIOLOGY

## 2022-03-17 PROCEDURE — 272N000001 HC OR GENERAL SUPPLY STERILE: Performed by: ORTHOPAEDIC SURGERY

## 2022-03-17 PROCEDURE — 250N000009 HC RX 250: Performed by: ORTHOPAEDIC SURGERY

## 2022-03-17 PROCEDURE — 710N000009 HC RECOVERY PHASE 1, LEVEL 1, PER MIN: Performed by: ORTHOPAEDIC SURGERY

## 2022-03-17 DEVICE — IMPLANTABLE DEVICE
Type: IMPLANTABLE DEVICE | Site: SHOULDER | Status: FUNCTIONAL
Brand: FLEX SHOULDER SYSTEM

## 2022-03-17 DEVICE — SCREW PERIPHERAL 22MM: Type: IMPLANTABLE DEVICE | Site: SHOULDER | Status: FUNCTIONAL

## 2022-03-17 DEVICE — SCREW PERIPHERAL 5.0X30MM DWJ330: Type: IMPLANTABLE DEVICE | Site: SHOULDER | Status: FUNCTIONAL

## 2022-03-17 DEVICE — IMPLANTABLE DEVICE: Type: IMPLANTABLE DEVICE | Site: SHOULDER | Status: FUNCTIONAL

## 2022-03-17 DEVICE — SCREW PERIPHERAL 26MM: Type: IMPLANTABLE DEVICE | Site: SHOULDER | Status: FUNCTIONAL

## 2022-03-17 DEVICE — IMPLANTABLE DEVICE
Type: IMPLANTABLE DEVICE | Site: SHOULDER | Status: FUNCTIONAL
Brand: AEQUALIS™ ASCEND™ FLEX

## 2022-03-17 DEVICE — BASEPLATE LATERAL RVRS 25MM OFFS 15D WEDGE AUGMENT DWJ505: Type: IMPLANTABLE DEVICE | Site: SHOULDER | Status: FUNCTIONAL

## 2022-03-17 RX ORDER — PANTOPRAZOLE SODIUM 40 MG/1
40 TABLET, DELAYED RELEASE ORAL
Status: DISCONTINUED | OUTPATIENT
Start: 2022-03-17 | End: 2022-03-21 | Stop reason: HOSPADM

## 2022-03-17 RX ORDER — GLYCOPYRROLATE 0.2 MG/ML
INJECTION, SOLUTION INTRAMUSCULAR; INTRAVENOUS PRN
Status: DISCONTINUED | OUTPATIENT
Start: 2022-03-17 | End: 2022-03-17

## 2022-03-17 RX ORDER — ONDANSETRON 2 MG/ML
4 INJECTION INTRAMUSCULAR; INTRAVENOUS EVERY 30 MIN PRN
Status: DISCONTINUED | OUTPATIENT
Start: 2022-03-17 | End: 2022-03-17 | Stop reason: HOSPADM

## 2022-03-17 RX ORDER — ACETAMINOPHEN 325 MG/1
650 TABLET ORAL EVERY 4 HOURS PRN
Status: DISCONTINUED | OUTPATIENT
Start: 2022-03-20 | End: 2022-03-21 | Stop reason: HOSPADM

## 2022-03-17 RX ORDER — FENTANYL CITRATE 50 UG/ML
INJECTION, SOLUTION INTRAMUSCULAR; INTRAVENOUS PRN
Status: DISCONTINUED | OUTPATIENT
Start: 2022-03-17 | End: 2022-03-17

## 2022-03-17 RX ORDER — HYDROMORPHONE HCL IN WATER/PF 6 MG/30 ML
0.2 PATIENT CONTROLLED ANALGESIA SYRINGE INTRAVENOUS EVERY 5 MIN PRN
Status: DISCONTINUED | OUTPATIENT
Start: 2022-03-17 | End: 2022-03-17 | Stop reason: HOSPADM

## 2022-03-17 RX ORDER — OXYCODONE HYDROCHLORIDE 5 MG/1
10 TABLET ORAL EVERY 4 HOURS PRN
Status: DISCONTINUED | OUTPATIENT
Start: 2022-03-17 | End: 2022-03-21 | Stop reason: HOSPADM

## 2022-03-17 RX ORDER — PRAVASTATIN SODIUM 40 MG
40 TABLET ORAL AT BEDTIME
Status: DISCONTINUED | OUTPATIENT
Start: 2022-03-17 | End: 2022-03-21 | Stop reason: HOSPADM

## 2022-03-17 RX ORDER — ONDANSETRON 2 MG/ML
4 INJECTION INTRAMUSCULAR; INTRAVENOUS EVERY 6 HOURS PRN
Status: DISCONTINUED | OUTPATIENT
Start: 2022-03-17 | End: 2022-03-21 | Stop reason: HOSPADM

## 2022-03-17 RX ORDER — FENTANYL CITRATE 0.05 MG/ML
25 INJECTION, SOLUTION INTRAMUSCULAR; INTRAVENOUS EVERY 5 MIN PRN
Status: DISCONTINUED | OUTPATIENT
Start: 2022-03-17 | End: 2022-03-17 | Stop reason: HOSPADM

## 2022-03-17 RX ORDER — PROCHLORPERAZINE MALEATE 5 MG
5 TABLET ORAL EVERY 6 HOURS PRN
Status: DISCONTINUED | OUTPATIENT
Start: 2022-03-17 | End: 2022-03-21 | Stop reason: HOSPADM

## 2022-03-17 RX ORDER — DEXAMETHASONE SODIUM PHOSPHATE 4 MG/ML
INJECTION, SOLUTION INTRA-ARTICULAR; INTRALESIONAL; INTRAMUSCULAR; INTRAVENOUS; SOFT TISSUE PRN
Status: DISCONTINUED | OUTPATIENT
Start: 2022-03-17 | End: 2022-03-17

## 2022-03-17 RX ORDER — ACETAMINOPHEN 325 MG/1
975 TABLET ORAL EVERY 8 HOURS
Status: COMPLETED | OUTPATIENT
Start: 2022-03-17 | End: 2022-03-20

## 2022-03-17 RX ORDER — FINASTERIDE 5 MG/1
5 TABLET, FILM COATED ORAL DAILY
Status: DISCONTINUED | OUTPATIENT
Start: 2022-03-17 | End: 2022-03-21 | Stop reason: HOSPADM

## 2022-03-17 RX ORDER — TAMSULOSIN HYDROCHLORIDE 0.4 MG/1
0.4 CAPSULE ORAL ONCE
Status: COMPLETED | OUTPATIENT
Start: 2022-03-17 | End: 2022-03-17

## 2022-03-17 RX ORDER — ASPIRIN 81 MG/1
81 TABLET ORAL 2 TIMES DAILY
Status: DISCONTINUED | OUTPATIENT
Start: 2022-03-17 | End: 2022-03-21 | Stop reason: HOSPADM

## 2022-03-17 RX ORDER — GABAPENTIN 100 MG/1
100 CAPSULE ORAL
Status: DISCONTINUED | OUTPATIENT
Start: 2022-03-17 | End: 2022-03-21 | Stop reason: HOSPADM

## 2022-03-17 RX ORDER — LIDOCAINE 40 MG/G
CREAM TOPICAL
Status: DISCONTINUED | OUTPATIENT
Start: 2022-03-17 | End: 2022-03-21 | Stop reason: HOSPADM

## 2022-03-17 RX ORDER — CEFAZOLIN SODIUM 1 G/3ML
1 INJECTION, POWDER, FOR SOLUTION INTRAMUSCULAR; INTRAVENOUS EVERY 8 HOURS
Status: COMPLETED | OUTPATIENT
Start: 2022-03-17 | End: 2022-03-18

## 2022-03-17 RX ORDER — LIDOCAINE HYDROCHLORIDE 20 MG/ML
INJECTION, SOLUTION INFILTRATION; PERINEURAL PRN
Status: DISCONTINUED | OUTPATIENT
Start: 2022-03-17 | End: 2022-03-17

## 2022-03-17 RX ORDER — BUPRENORPHINE 10 UG/H
1 PATCH TRANSDERMAL
Status: ON HOLD | COMMUNITY
End: 2022-03-21

## 2022-03-17 RX ORDER — HYDROMORPHONE HYDROCHLORIDE 1 MG/ML
0.5 INJECTION, SOLUTION INTRAMUSCULAR; INTRAVENOUS; SUBCUTANEOUS
Status: DISCONTINUED | OUTPATIENT
Start: 2022-03-17 | End: 2022-03-21 | Stop reason: HOSPADM

## 2022-03-17 RX ORDER — SODIUM CHLORIDE, SODIUM LACTATE, POTASSIUM CHLORIDE, CALCIUM CHLORIDE 600; 310; 30; 20 MG/100ML; MG/100ML; MG/100ML; MG/100ML
INJECTION, SOLUTION INTRAVENOUS CONTINUOUS
Status: DISCONTINUED | OUTPATIENT
Start: 2022-03-17 | End: 2022-03-17 | Stop reason: HOSPADM

## 2022-03-17 RX ORDER — MAGNESIUM HYDROXIDE 1200 MG/15ML
LIQUID ORAL PRN
Status: DISCONTINUED | OUTPATIENT
Start: 2022-03-17 | End: 2022-03-17 | Stop reason: HOSPADM

## 2022-03-17 RX ORDER — CEFAZOLIN SODIUM/WATER 2 G/20 ML
2 SYRINGE (ML) INTRAVENOUS
Status: COMPLETED | OUTPATIENT
Start: 2022-03-17 | End: 2022-03-17

## 2022-03-17 RX ORDER — CEFAZOLIN SODIUM/WATER 2 G/20 ML
2 SYRINGE (ML) INTRAVENOUS SEE ADMIN INSTRUCTIONS
Status: DISCONTINUED | OUTPATIENT
Start: 2022-03-17 | End: 2022-03-17 | Stop reason: HOSPADM

## 2022-03-17 RX ORDER — PROPOFOL 10 MG/ML
INJECTION, EMULSION INTRAVENOUS PRN
Status: DISCONTINUED | OUTPATIENT
Start: 2022-03-17 | End: 2022-03-17

## 2022-03-17 RX ORDER — AMLODIPINE BESYLATE 5 MG/1
5 TABLET ORAL DAILY
Status: DISCONTINUED | OUTPATIENT
Start: 2022-03-18 | End: 2022-03-21 | Stop reason: HOSPADM

## 2022-03-17 RX ORDER — TAMSULOSIN HYDROCHLORIDE 0.4 MG/1
0.4 CAPSULE ORAL 2 TIMES DAILY
Status: DISCONTINUED | OUTPATIENT
Start: 2022-03-17 | End: 2022-03-21 | Stop reason: HOSPADM

## 2022-03-17 RX ORDER — AMOXICILLIN 250 MG
1 CAPSULE ORAL DAILY PRN
Status: ON HOLD | COMMUNITY
End: 2022-03-21

## 2022-03-17 RX ORDER — HYDROXYZINE HYDROCHLORIDE 10 MG/1
10 TABLET, FILM COATED ORAL EVERY 6 HOURS PRN
Status: DISCONTINUED | OUTPATIENT
Start: 2022-03-17 | End: 2022-03-21 | Stop reason: HOSPADM

## 2022-03-17 RX ORDER — EPHEDRINE SULFATE 50 MG/ML
INJECTION, SOLUTION INTRAMUSCULAR; INTRAVENOUS; SUBCUTANEOUS PRN
Status: DISCONTINUED | OUTPATIENT
Start: 2022-03-17 | End: 2022-03-17

## 2022-03-17 RX ORDER — TRANEXAMIC ACID 650 MG/1
1950 TABLET ORAL ONCE
Status: COMPLETED | OUTPATIENT
Start: 2022-03-17 | End: 2022-03-17

## 2022-03-17 RX ORDER — ALBUTEROL SULFATE 90 UG/1
1-2 AEROSOL, METERED RESPIRATORY (INHALATION) EVERY 6 HOURS PRN
Status: DISCONTINUED | OUTPATIENT
Start: 2022-03-17 | End: 2022-03-21 | Stop reason: HOSPADM

## 2022-03-17 RX ORDER — AMOXICILLIN 250 MG
1 CAPSULE ORAL 2 TIMES DAILY
Status: DISCONTINUED | OUTPATIENT
Start: 2022-03-17 | End: 2022-03-21 | Stop reason: HOSPADM

## 2022-03-17 RX ORDER — ONDANSETRON 2 MG/ML
INJECTION INTRAMUSCULAR; INTRAVENOUS PRN
Status: DISCONTINUED | OUTPATIENT
Start: 2022-03-17 | End: 2022-03-17

## 2022-03-17 RX ORDER — SODIUM CHLORIDE, SODIUM LACTATE, POTASSIUM CHLORIDE, CALCIUM CHLORIDE 600; 310; 30; 20 MG/100ML; MG/100ML; MG/100ML; MG/100ML
INJECTION, SOLUTION INTRAVENOUS CONTINUOUS
Status: DISCONTINUED | OUTPATIENT
Start: 2022-03-17 | End: 2022-03-21 | Stop reason: HOSPADM

## 2022-03-17 RX ORDER — ONDANSETRON 4 MG/1
4 TABLET, ORALLY DISINTEGRATING ORAL EVERY 30 MIN PRN
Status: DISCONTINUED | OUTPATIENT
Start: 2022-03-17 | End: 2022-03-17 | Stop reason: HOSPADM

## 2022-03-17 RX ORDER — VANCOMYCIN HYDROCHLORIDE 1 G/20ML
INJECTION, POWDER, LYOPHILIZED, FOR SOLUTION INTRAVENOUS PRN
Status: DISCONTINUED | OUTPATIENT
Start: 2022-03-17 | End: 2022-03-17 | Stop reason: HOSPADM

## 2022-03-17 RX ORDER — LABETALOL HYDROCHLORIDE 5 MG/ML
10 INJECTION, SOLUTION INTRAVENOUS
Status: DISCONTINUED | OUTPATIENT
Start: 2022-03-17 | End: 2022-03-17 | Stop reason: HOSPADM

## 2022-03-17 RX ORDER — ACETAMINOPHEN 325 MG/1
975 TABLET ORAL ONCE
Status: COMPLETED | OUTPATIENT
Start: 2022-03-17 | End: 2022-03-17

## 2022-03-17 RX ORDER — OXYCODONE HYDROCHLORIDE 5 MG/1
5 TABLET ORAL EVERY 6 HOURS PRN
Status: ON HOLD | COMMUNITY
End: 2022-03-18

## 2022-03-17 RX ORDER — ONDANSETRON 4 MG/1
4 TABLET, ORALLY DISINTEGRATING ORAL EVERY 6 HOURS PRN
Status: DISCONTINUED | OUTPATIENT
Start: 2022-03-17 | End: 2022-03-21 | Stop reason: HOSPADM

## 2022-03-17 RX ADMIN — Medication 5 MG: at 07:58

## 2022-03-17 RX ADMIN — CEFAZOLIN 1 G: 1 INJECTION, POWDER, FOR SOLUTION INTRAMUSCULAR; INTRAVENOUS at 16:03

## 2022-03-17 RX ADMIN — DEXAMETHASONE SODIUM PHOSPHATE 4 MG: 4 INJECTION, SOLUTION INTRA-ARTICULAR; INTRALESIONAL; INTRAMUSCULAR; INTRAVENOUS; SOFT TISSUE at 07:50

## 2022-03-17 RX ADMIN — SODIUM CHLORIDE, POTASSIUM CHLORIDE, SODIUM LACTATE AND CALCIUM CHLORIDE: 600; 310; 30; 20 INJECTION, SOLUTION INTRAVENOUS at 11:30

## 2022-03-17 RX ADMIN — FENTANYL CITRATE 25 MCG: 50 INJECTION, SOLUTION INTRAMUSCULAR; INTRAVENOUS at 08:59

## 2022-03-17 RX ADMIN — PHENYLEPHRINE HYDROCHLORIDE 0.4 MCG/KG/MIN: 10 INJECTION INTRAVENOUS at 07:51

## 2022-03-17 RX ADMIN — Medication 20 ML: at 07:01

## 2022-03-17 RX ADMIN — SODIUM CHLORIDE, POTASSIUM CHLORIDE, SODIUM LACTATE AND CALCIUM CHLORIDE: 600; 310; 30; 20 INJECTION, SOLUTION INTRAVENOUS at 22:51

## 2022-03-17 RX ADMIN — FENTANYL CITRATE 25 MCG: 50 INJECTION, SOLUTION INTRAMUSCULAR; INTRAVENOUS at 07:57

## 2022-03-17 RX ADMIN — TRANEXAMIC ACID 1950 MG: 650 TABLET ORAL at 06:15

## 2022-03-17 RX ADMIN — MIDAZOLAM HYDROCHLORIDE 1 MG: 1 INJECTION, SOLUTION INTRAMUSCULAR; INTRAVENOUS at 06:50

## 2022-03-17 RX ADMIN — FENTANYL CITRATE 50 MCG: 50 INJECTION, SOLUTION INTRAMUSCULAR; INTRAVENOUS at 07:39

## 2022-03-17 RX ADMIN — Medication 5 MG: at 07:49

## 2022-03-17 RX ADMIN — ACETAMINOPHEN 975 MG: 325 TABLET, FILM COATED ORAL at 06:15

## 2022-03-17 RX ADMIN — ONDANSETRON 4 MG: 2 INJECTION INTRAMUSCULAR; INTRAVENOUS at 09:29

## 2022-03-17 RX ADMIN — PROPOFOL 100 MG: 10 INJECTION, EMULSION INTRAVENOUS at 07:39

## 2022-03-17 RX ADMIN — SODIUM CHLORIDE, POTASSIUM CHLORIDE, SODIUM LACTATE AND CALCIUM CHLORIDE: 600; 310; 30; 20 INJECTION, SOLUTION INTRAVENOUS at 07:06

## 2022-03-17 RX ADMIN — HYDROXYZINE HYDROCHLORIDE 10 MG: 10 TABLET ORAL at 23:39

## 2022-03-17 RX ADMIN — ASPIRIN 81 MG: 81 TABLET, COATED ORAL at 21:15

## 2022-03-17 RX ADMIN — ACETAMINOPHEN 975 MG: 325 TABLET, FILM COATED ORAL at 17:29

## 2022-03-17 RX ADMIN — Medication 2 G: at 07:38

## 2022-03-17 RX ADMIN — LIDOCAINE HYDROCHLORIDE 60 MG: 20 INJECTION, SOLUTION INFILTRATION; PERINEURAL at 07:39

## 2022-03-17 RX ADMIN — GLYCOPYRROLATE 0.2 MG: 0.2 INJECTION, SOLUTION INTRAMUSCULAR; INTRAVENOUS at 08:14

## 2022-03-17 RX ADMIN — TAMSULOSIN HYDROCHLORIDE 0.4 MG: 0.4 CAPSULE ORAL at 18:21

## 2022-03-17 RX ADMIN — FINASTERIDE 5 MG: 5 TABLET, FILM COATED ORAL at 16:04

## 2022-03-17 RX ADMIN — SENNOSIDES AND DOCUSATE SODIUM 1 TABLET: 50; 8.6 TABLET ORAL at 21:15

## 2022-03-17 RX ADMIN — PHENYLEPHRINE HYDROCHLORIDE 100 MCG: 10 INJECTION INTRAVENOUS at 09:04

## 2022-03-17 RX ADMIN — PRAVASTATIN SODIUM 40 MG: 40 TABLET ORAL at 21:12

## 2022-03-17 RX ADMIN — OXYCODONE HYDROCHLORIDE 10 MG: 5 TABLET ORAL at 18:41

## 2022-03-17 ASSESSMENT — ACTIVITIES OF DAILY LIVING (ADL)
ADLS_ACUITY_SCORE: 3
ADLS_ACUITY_SCORE: 12
ADLS_ACUITY_SCORE: 3

## 2022-03-17 NOTE — ANESTHESIA POSTPROCEDURE EVALUATION
Patient: Bhaskar Ott    Procedure: Procedure(s):  RIGHT REVERSE SHOULDR ARTHROPLASTY WITH CUSTOM GUIDE       Anesthesia Type:  General    Note:  Disposition: Admission   Postop Pain Control: Uneventful            Sign Out: Well controlled pain   PONV: No   Neuro/Psych: Uneventful            Sign Out: Acceptable/Baseline neuro status   Airway/Respiratory: Uneventful            Sign Out: Acceptable/Baseline resp. status   CV/Hemodynamics: Uneventful            Sign Out: Acceptable CV status; No obvious hypovolemia; No obvious fluid overload   Other NRE: NONE   DID A NON-ROUTINE EVENT OCCUR? No           Last vitals:  Vitals Value Taken Time   /70 03/17/22 1110   Temp 36.2  C (97.1  F) 03/17/22 1050   Pulse 68 03/17/22 1118   Resp 7 03/17/22 1118   SpO2 99 % 03/17/22 1118   Vitals shown include unvalidated device data.    Electronically Signed By: ELIE SHAY MD  March 17, 2022  2:54 PM

## 2022-03-17 NOTE — ANESTHESIA PROCEDURE NOTES
Airway       Patient location during procedure: OR  Staff -        Anesthesiologist:  Tono Gill MD       CRNA: Citlali Bañuelos APRN CRNA       Other Anesthesia Staff: Tata Clancy       Performed By: anesthesiologist  Consent for Airway        Urgency: elective  Indications and Patient Condition       Indications for airway management: jennifer-procedural       Induction type:intravenous       Mask difficulty assessment: 1 - vent by mask    Final Airway Details       Final airway type: supraglottic airway    Supraglottic Airway Details        Type: LMA       Brand: Ambu AuraGain       LMA size: 5    Post intubation assessment        Placement verified by: capnometry, equal breath sounds and chest rise        Number of attempts at approach: 1       Number of other approaches attempted: 0       Secured with: pink tape       Ease of procedure: easy       Dentition: Intact and Unchanged

## 2022-03-17 NOTE — OP NOTE
[facility]    Operative Note    Pre-operative diagnosis: Glenohumeral arthritis, right [M19.011], severe glenoid bone loss and cuff tear.  Post-operative diagnosis Same as pre-operative diagnosis    Procedure: Procedure(s):  RIGHT REVERSE SHOULDR ARTHROPLASTY WITH CUSTOM GUIDE  Surgeon: Surgeon(s) and Role:     * Keyur Bunn MD - Primary     * Lisa Dinh PA-C - Assisting  Anesthesia: General   Estimated Blood Loss: 125ml    Drains: None  Specimens: * No specimens in log *  Findings:   None.  Complications: None.  Implants:   Implant Name Type Inv. Item Serial No.  Lot No. LRB No. Used Action   [employer] PATIENT SPECIFIC REVERSE GLENOID GUIDE    SYD [id number] Right 1 Used as a Supply   BASEPLATE LATERAL RVRS 25MM OFFS 15D WEDGE AUGMENT [id number] - [id number] Total Joint Component/Insert BASEPLATE LATERAL RVRS 25MM OFFS 15D WEDGE AUGMENT [id number] [employer]  Right 1 Implanted   SCREW PERIPHERAL 26MM - [id number] Metallic Hardware/[place] SCREW PERIPHERAL 26MM  [employer] 41 07 03/16//22 Right 1 Implanted   SCREW PERIPHERAL 5.0X30MM [id number] - [id number] Metallic Hardware/[place] SCREW PERIPHERAL 5.0X30MM [id number]  [employer] 41 07 03/16//22 Right 1 Implanted   SCREW PERIPHERAL 22MM - [id number] Metallic Hardware/[place] SCREW PERIPHERAL 22MM  [employer] 41 07 03/16//22 Right 2 Implanted   SCREW 25MM Metallic Hardware/[place]   [employer]ER 41 07 03/16//22 Right 1 Implanted   GLENOID ECCENTRIC GLENOSPHERE 36MM OFFSET+2MM   [id number] TORNIER  Right 1 Implanted   STEM HUMERAL ANATOMIC STD PTC 4B - [id number] Total Joint Component/Insert STEM HUMERAL ANATOMIC STD PTC 4B [id number] [employer]  Right 1 Implanted   INSERT REVISION REVERSE +6/12 36MM - [id number] Total Joint Component/Insert INSERT REVISION REVERSE +6/12 36MM [id number] [employer]  Right 1 Implanted   FLEX SHOULDER REVERSED TRAY    [id number] TORNIER  Right 1 Implanted          NARRATIVE:  After discussing the risks, benefits, possible complications and alternatives, the patient voiced their understanding and gave consent . The patient wished to proceed.  An interscalene block was administered. The patient was then brought to the operating room and placed in a supine position. Following administration of general anesthetic, the patient was positioned in the modified beach chair position. All bony prominences were well-padded. A lateral chest pad was placed, and the head was secured with a Vazquez head collier with safety goggles in place.    After sterile prep and drape, a standard deltopectoral incision   was made, carried down through the skin and soft tissue. Electrocautery was used for hemostasis. Cephalic vein was taken laterally with the deltoid, and was noted to be intact throughout the case and at closure. The deltopectoral fascia was incised to approach the shoulder.    The superior cuff is torn and absent.    The biceps was tenodesed at the superior edge of the bicipital groove.  The subscapularis tendon is tenotomized 1cm medial to the insertion area for later repair.  This allows delivery of the head from the incision, after which a starting point   for the proximal guide was then well-visualized.  The proximal cut guide is used to check the anatomic version which is approximately 30 degrees. This was cut in 30 degrees of retroversion. Following this, it was then reamed and broached up to a size 4.  This was left in place and the protective cap was placed. There is a cyst at the metaphysis and prophylactic cerclage sutures were placed around the proximal humerus in a racking hitch x2.    A Fukuda retractor was used to displace the humeral head posteriorly.  The glenoid was then prepared resecting the labrum in a 360 degree arc, and removing the residual biceps stump.  Once the appropriate approach had been obtained, the guide for the central pin was then positioned at  the inferior aspect of the glenoid with a custom guide as there is end stage glenoid bone loss and deformity.  The central pin was passed and checked along the medial scapular neck with my direct digital palpation, and noted to be in excellent position.  The pin was then reamed over to prepare the glenoid baseplate. The bone is suboptimal in its depth and markedly deformed.    The baseplate is fully seated and drilled. Appropriate length locking screws were placed x4 with excellent fixation.  The cone reamer was used to remove any glenoid prominence.  The glenosphere was impacted into place and the central screw was fully tightened. This shows excellent fixation and good clearance inferiorly.    Following this, attention was turned to the humerus.  The baseplate was then trialed with the high offset metaphyseal tray and  6mm bearing, showing excellent fit, stability, and rotation. The arm could be brought through 120 degrees of passive forward flexion and 55 degrees of passive external rotation without any opening of the anterior joint line.  Instruments were removed. Final implants were seated after copious irrigation.  Using the same standard bearing and tray, this shows excellent stability after reduction.  1000 mL of brown wash followed by 1000 mL of normal saline was used for irrigation.    The subscapularis was then reattached with transtendon figure eight sutures x7.  This was done with the arm abducted 40 degrees and externally rotated 30 degrees to allow excellent excursion.  Copious irrigation was performed again with pulsatile lavage. The deltopectoral interval was closed with 0 Vicryl. The subcutaneous tissue was closed with 3-0 Vicryl and skin with 4-0 Monocryl. Generic Prineo dressing was applied.     Vancomycin powder 1gm total was used during the case and was applied to the intramedulary canal prior to stem placement, the joint space prior to subscapularis repair and the deep and superficial wound  layers.    A physician assistant was necessary for this case to protect the neurovascular structures when retracting.  Maintaining the position of the arm during implantation of the components, Assisting with the dislocation of the implant trials and maintaining a clear operative field during the case. The PA is present for the entirety of the case.    The patient went to the recovery room in good condition, and will have a standard reverse total shoulder rehabilitation course.     COMPLICATIONS:  None.     CONDITION UPON DISCHARGE FOR OPERATING ROOM:  Stable    PLAN:  1. Antibiotic prophylaxis were given including Ancef. Abx given within 1 hr of surgical incision and discontinued within 24hrs.   2. DVT prophylaxis: aspirin and sequential compression device's will be started within 24hrs of surgery.   3. Weight Bearing NWB (Non weight bearing) right .upper extremity   4. Discharge anticipated date POD# 1 to home.   5. Pain Medication Oxycodone and Dilaudid.  6. Exercises: Full elbow, wrist, and hand AROM/PROM on operative side 5x/day.  Encourage ADLs out of sling as tolerated.

## 2022-03-17 NOTE — PLAN OF CARE
Goal Outcome Evaluation:     Pt starting to have axilla pain to (R) arm  rating pain #7--given Oxycodone 10mg.  Pt stated he is used to taking Oxycodone 5mg every 6 hrs at home for back pain.  Pt is antsy sitting in chair and in bed--constantly moving around.  No family is bringing in sling .  Moving fingers on (R) hand but sl has numbness.  Voiding in small amounts--requested Flomax to be given early.  Shoulder dsg dry and intact.  Pt has jordy ankle edema and swollen (L) ant abdomen--ultrasound done.

## 2022-03-17 NOTE — PROGRESS NOTES
PTA medications completed by Medication Scribe day of surgery     Medication history sources: Patient, Surescripts and H&P  In the past week, patient estimated taking medication this percent of the time: Greater than 90%  Adherence assessment: N/A Not Observed    Significant changes made to the medication list:  Patient reports no longer taking the following meds (med scribe removed from PTA med list): Calcium Carb-Vitamin D, Cyanocobalamin, Nicotine Lozenge      Additional medication history information:   None    Medication reconciliation completed by provider prior to medication history? No    Time spent in this activity: 40 minutes    The information provided in this note is only as accurate as the sources available at the time of update(s)      Prior to Admission medications    Medication Sig Last Dose Taking? Auth Provider   Acetaminophen 325 MG CAPS Take 325-650 mg by mouth every 4 hours as needed  at prn Yes Reported, Patient   albuterol (PROAIR HFA/PROVENTIL HFA/VENTOLIN HFA) 108 (90 Base) MCG/ACT inhaler USE 2 INHALATIONS EVERY 6 HOURS AS NEEDED FOR SHORTNESS OF BREATH / DYSPNEA OR WHEEZING  at prn Yes Noam Almonte MD   amLODIPine (NORVASC) 5 MG tablet Take 1 tablet (5 mg) by mouth daily 3/16/2022 at pm Yes Dayday Alberts MD   buprenorphine (BUTRANS) 10 MCG/HR WK patch Place 1 patch onto the skin every 7 days Past Week at Unknown time Yes Reported, Patient   finasteride (PROSCAR) 5 MG tablet TAKE 1 TABLET DAILY 3/16/2022 at am Yes Dayday Alberts MD   gabapentin (NEURONTIN) 100 MG capsule TAKE 1 TO 2 CAPSULES AT BEDTIME AS NEEDED FOR SLEEP 3/16/2022 at pm Yes Rosales Nuñez MD   lidocaine (XYLOCAINE) 5 % external ointment One application 4 x daily to affected areas lower back and knees if necessary  at prn Yes Dayday Alberts MD   Menthol 10 % AERO Externally apply 1 Dose topically 2 times daily as needed  at prn Yes Dayday Alberts MD   multivitamin w/minerals (THERA-VIT-M) tablet Take 1  tablet by mouth daily 3/16/2022 at pm Yes Unknown, Entered By History   naloxone (NARCAN) 4 MG/0.1ML nasal spray Spray 4 mg into one nostril alternating nostrils once as needed for opioid reversal every 2-3 minutes until assistance arrives  at prn Yes Reported, Patient   nicotine (NICOTROL) 10 MG inhaler Inhale 6-16 Cartridges into the lungs daily as needed Use 1 cartridge as needed for urge to smoke by puffing over course of 20min.  Use 6-16 cart/day; reduce number of cart/day over 6-12 weeks. MORE THAN THREE WEEKS Yes Reported, Patient   omeprazole (PRILOSEC) 20 MG DR capsule Take 1 capsule (20 mg) by mouth 2 times daily +++NEED RECHECK+++ 3/16/2022 at pm Yes Dayday Alberts MD   oxybutynin (DITROPAN) 5 MG tablet TAKE 1 TABLET BY MOUTH THREE TIMES A DAY 3/16/2022 at pm Yes Dayday Alberts MD   oxyCODONE (ROXICODONE) 5 MG tablet Take 5 mg by mouth every 6 hours as needed for severe pain 3/16/2022 at pm Yes Reported, Patient   pravastatin (PRAVACHOL) 40 MG tablet Take 1 tablet (40 mg) by mouth daily 3/16/2022 at am Yes Dayday Alberts MD   senna-docusate (SENOKOT-S/PERICOLACE) 8.6-50 MG tablet Take 1 tablet by mouth daily as needed for constipation  at prn Yes Reported, Patient   sildenafil (VIAGRA) 50 MG tablet 1/2 - 1 po one hour prior to anticipated intercourse  at prn Yes Dayday Alberts MD   tamsulosin (FLOMAX) 0.4 MG capsule TAKE 1 CAPSULE TWICE A DAY 3/17/2022 at am Yes Dayday Alberts MD   order for DME Equipment being ordered: motorized scooter   Dayday Alberts MD       Medication history completed by:    Arcenio Flannery CPhT  Medication [facility]

## 2022-03-17 NOTE — PROGRESS NOTES
Dr. Gill approved pt to Tx to ortho 2312.  A&Ox4, forgetful at times, 2L NC--lungs clear, Tele: NSR, bladder scanned for 598, pt voided and was incontinent, bladder re-scanned for 215cc.  Dressing c/d/i.  Pt tolerating juice and crackers.

## 2022-03-17 NOTE — ANESTHESIA CARE TRANSFER NOTE
Patient: Bhaskar Ott    Procedure: Procedure(s):  RIGHT REVERSE SHOULDR ARTHROPLASTY WITH CUSTOM GUIDE       Diagnosis: Glenohumeral arthritis, right [M19.011]  Diagnosis Additional Information: No value filed.    Anesthesia Type:   General     Note:    Oropharynx: oropharynx clear of all foreign objects  Level of Consciousness: awake  Oxygen Supplementation: face mask  Level of Supplemental Oxygen (L/min / FiO2): 6  Independent Airway: airway patency satisfactory and stable  Dentition: dentition unchanged  Vital Signs Stable: post-procedure vital signs reviewed and stable  Report to RN Given: handoff report given  Patient transferred to: PACU    Handoff Report: Identifed the Patient, Identified the Reponsible Provider, Reviewed the pertinent medical history, Discussed the surgical course, Reviewed Intra-OP anesthesia mangement and issues during anesthesia, Set expectations for post-procedure period and Allowed opportunity for questions and acknowledgement of understanding      Vitals:  Vitals Value Taken Time   BP     Temp     Pulse     Resp 20 03/17/22 1002   SpO2     Vitals shown include unvalidated device data.    Electronically Signed By: JAX Monique CRNA  March 17, 2022  10:03 AM
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
## 2022-03-17 NOTE — ANESTHESIA PROCEDURE NOTES
Brachial plexus Procedure Note    Pre-Procedure   Staff -        Anesthesiologist:  Tono Gill MD       Performed By: Anesthesiologist       Location: pre-op       Pre-Anesthestic Checklist: patient identified, IV checked, site marked, risks and benefits discussed, informed consent, monitors and equipment checked, at physician/surgeon's request and post-op pain management  Timeout:       Correct Patient: Yes        Correct Procedure: Yes        Correct Site: Yes        Correct Position: Yes        Correct Laterality: Yes        Site Marked: Yes  Procedure Documentation  Procedure: Brachial plexus       Laterality: right       Patient Position: supine       Patient Prep/Sterile Barriers: sterile gloves, mask       Skin prep: Chloraprep      Local skin infiltrated with mL of 1% lidocaine.  (interscalene approach).       Needle Type: insulated and short bevel (Arrow)       Needle Gauge: 21.        Needle Length (millimeters): 90        Ultrasound guided       1. Ultrasound was used to identify targeted nerve, plexus, vascular marker, or fascial plane and place a needle adjacent to it in real-time.       2. Ultrasound was used to visualize the spread of anesthetic in close proximity to the above referenced structure.       3. A permanent image is entered into the patient's record.       4. The visualized anatomic structures appeared normal.       5. There were no apparent abnormal pathologic findings.    Assessment/Narrative         The placement was negative for: blood aspirated, painful injection and site bleeding       Paresthesias: No.     Bolus given via needle..        Secured via.        Insertion/Infusion Method: Single Shot       Complications: none    Medication(s) Administered   Ropivacaine 0.5% w/ 1:400K Epi (Injection), 20 mL  Medication Administration Time: 3/17/2022 7:01 AM     Comments:  Peripheral nerve block performed at request of the primary medical team.      Postoperative pain block requested  by surgeon for severe postoperative pain.  Patient brought to Preop for procedure.      Pt tolerated well.    No complications.      The surgeon has given a verbal order transferring care of this patient to me for the performance of a regional analgesia block for post-op pain control. It is requested of me because I am uniquely trained and qualified to perform this block and the surgeon is neither trained nor qualified to perform this procedure.    ELIE SHAY MD   March 17, 2022 7:03 AM

## 2022-03-18 ENCOUNTER — APPOINTMENT (OUTPATIENT)
Dept: PHYSICAL THERAPY | Facility: CLINIC | Age: 82
DRG: 483 | End: 2022-03-18
Attending: PHYSICIAN ASSISTANT
Payer: COMMERCIAL

## 2022-03-18 ENCOUNTER — APPOINTMENT (OUTPATIENT)
Dept: OCCUPATIONAL THERAPY | Facility: CLINIC | Age: 82
DRG: 483 | End: 2022-03-18
Attending: ORTHOPAEDIC SURGERY
Payer: COMMERCIAL

## 2022-03-18 LAB
GLUCOSE BLD-MCNC: 99 MG/DL (ref 70–99)
HGB BLD-MCNC: 10 G/DL (ref 13.3–17.7)

## 2022-03-18 PROCEDURE — 97535 SELF CARE MNGMENT TRAINING: CPT | Mod: GO | Performed by: OCCUPATIONAL THERAPIST

## 2022-03-18 PROCEDURE — 250N000013 HC RX MED GY IP 250 OP 250 PS 637: Performed by: STUDENT IN AN ORGANIZED HEALTH CARE EDUCATION/TRAINING PROGRAM

## 2022-03-18 PROCEDURE — 97161 PT EVAL LOW COMPLEX 20 MIN: CPT | Mod: GP

## 2022-03-18 PROCEDURE — 97110 THERAPEUTIC EXERCISES: CPT | Mod: GO | Performed by: OCCUPATIONAL THERAPIST

## 2022-03-18 PROCEDURE — 97166 OT EVAL MOD COMPLEX 45 MIN: CPT | Mod: GO | Performed by: OCCUPATIONAL THERAPIST

## 2022-03-18 PROCEDURE — 250N000011 HC RX IP 250 OP 636: Performed by: STUDENT IN AN ORGANIZED HEALTH CARE EDUCATION/TRAINING PROGRAM

## 2022-03-18 PROCEDURE — 250N000013 HC RX MED GY IP 250 OP 250 PS 637: Performed by: PHYSICIAN ASSISTANT

## 2022-03-18 PROCEDURE — 99232 SBSQ HOSP IP/OBS MODERATE 35: CPT | Performed by: INTERNAL MEDICINE

## 2022-03-18 PROCEDURE — 120N000001 HC R&B MED SURG/OB

## 2022-03-18 PROCEDURE — 36415 COLL VENOUS BLD VENIPUNCTURE: CPT | Performed by: STUDENT IN AN ORGANIZED HEALTH CARE EDUCATION/TRAINING PROGRAM

## 2022-03-18 PROCEDURE — 82947 ASSAY GLUCOSE BLOOD QUANT: CPT | Performed by: INTERNAL MEDICINE

## 2022-03-18 PROCEDURE — 85018 HEMOGLOBIN: CPT | Performed by: STUDENT IN AN ORGANIZED HEALTH CARE EDUCATION/TRAINING PROGRAM

## 2022-03-18 PROCEDURE — 97116 GAIT TRAINING THERAPY: CPT | Mod: GP

## 2022-03-18 PROCEDURE — 97530 THERAPEUTIC ACTIVITIES: CPT | Mod: GP

## 2022-03-18 RX ORDER — ACETAMINOPHEN 325 MG/1
650 TABLET ORAL EVERY 4 HOURS PRN
Qty: 100 TABLET | Refills: 0 | Status: SHIPPED | OUTPATIENT
Start: 2022-03-18

## 2022-03-18 RX ORDER — OXYCODONE HYDROCHLORIDE 5 MG/1
5-10 TABLET ORAL EVERY 6 HOURS PRN
Qty: 30 TABLET | Refills: 0 | Status: SHIPPED | OUTPATIENT
Start: 2022-03-18 | End: 2022-03-21

## 2022-03-18 RX ADMIN — PRAVASTATIN SODIUM 40 MG: 40 TABLET ORAL at 20:40

## 2022-03-18 RX ADMIN — CEFAZOLIN 1 G: 1 INJECTION, POWDER, FOR SOLUTION INTRAMUSCULAR; INTRAVENOUS at 00:16

## 2022-03-18 RX ADMIN — TAMSULOSIN HYDROCHLORIDE 0.4 MG: 0.4 CAPSULE ORAL at 08:25

## 2022-03-18 RX ADMIN — OXYCODONE HYDROCHLORIDE 10 MG: 5 TABLET ORAL at 04:46

## 2022-03-18 RX ADMIN — OXYCODONE HYDROCHLORIDE 10 MG: 5 TABLET ORAL at 13:48

## 2022-03-18 RX ADMIN — TAMSULOSIN HYDROCHLORIDE 0.4 MG: 0.4 CAPSULE ORAL at 17:05

## 2022-03-18 RX ADMIN — ASPIRIN 81 MG: 81 TABLET, COATED ORAL at 08:25

## 2022-03-18 RX ADMIN — ACETAMINOPHEN 975 MG: 325 TABLET, FILM COATED ORAL at 17:05

## 2022-03-18 RX ADMIN — ACETAMINOPHEN 975 MG: 325 TABLET, FILM COATED ORAL at 02:04

## 2022-03-18 RX ADMIN — OXYCODONE HYDROCHLORIDE 10 MG: 5 TABLET ORAL at 08:33

## 2022-03-18 RX ADMIN — ASPIRIN 81 MG: 81 TABLET, COATED ORAL at 20:41

## 2022-03-18 RX ADMIN — ACETAMINOPHEN 975 MG: 325 TABLET, FILM COATED ORAL at 10:10

## 2022-03-18 RX ADMIN — SENNOSIDES AND DOCUSATE SODIUM 1 TABLET: 50; 8.6 TABLET ORAL at 20:40

## 2022-03-18 RX ADMIN — FINASTERIDE 5 MG: 5 TABLET, FILM COATED ORAL at 08:25

## 2022-03-18 RX ADMIN — SENNOSIDES AND DOCUSATE SODIUM 1 TABLET: 50; 8.6 TABLET ORAL at 08:25

## 2022-03-18 RX ADMIN — PANTOPRAZOLE SODIUM 40 MG: 40 TABLET, DELAYED RELEASE ORAL at 16:06

## 2022-03-18 RX ADMIN — OXYCODONE HYDROCHLORIDE 10 MG: 5 TABLET ORAL at 20:39

## 2022-03-18 RX ADMIN — AMLODIPINE BESYLATE 5 MG: 5 TABLET ORAL at 08:25

## 2022-03-18 RX ADMIN — PANTOPRAZOLE SODIUM 40 MG: 40 TABLET, DELAYED RELEASE ORAL at 05:59

## 2022-03-18 ASSESSMENT — ACTIVITIES OF DAILY LIVING (ADL)
ADLS_ACUITY_SCORE: 3

## 2022-03-18 NOTE — PROGRESS NOTES
"ORTHOPEDIC UPPER EXTREMITY PROGRESS NOTE    POD# 1  Patient is a 81 year old male who underwent Procedure(s):  RIGHT REVERSE SHOULDR ARTHROPLASTY WITH CUSTOM GUIDE on 3/17/2022 on right. Pain is decently controlled. Restless.  Initially had some retention, takes flomax PTA.    Vitals:   /58 (BP Location: Left arm)   Pulse 69   Temp 98.6  F (37  C) (Oral)   Resp 16   Ht 1.6 m (5' 3\")   Wt 60.8 kg (134 lb)   SpO2 98%   BMI 23.74 kg/m        EXAM   The patient is awake and alert.   Sensation is intact.  Digital Flexion/Extension maintained.   Brisk cap refill.   The incision is uncovered, prineo CDI.     Labs: Recent Labs   Lab Test 03/05/21  1230 12/11/20  1405 11/10/20  0855 08/30/15  1358 08/30/15  0725 08/29/15  0822 08/28/15  1148 08/28/15  0342   HGB 11.6* 11.7* 12.7*   < > 7.5* 8.0*   < > 7.3*   INR  --   --   --   --  1.16* 1.28*  --  1.40*    < > = values in this interval not displayed.       ASSESSMENT  S/p right reverse TSA   PLAN  1. Continue with shoulder immobilizer  2. Weight Bearing NWB  3. Wound Care leave undisturbed  4. Discharge anticipated date today pending medical stability to home  5. Cont Pain Control oxycodone    Kjerstin Foss PA-C [facility]    "

## 2022-03-18 NOTE — CONSULTS
Care Management Initial Consult    General Information  Assessment completed with: Patient, VM-chart reviewBhaskar  Type of CM/SW Visit: Initial Assessment    Primary Care Provider verified and updated as needed: No   Readmission within the last 30 days:        Reason for Consult: discharge planning  Advance Care Planning:            Communication Assessment  Patient's communication style: spoken language (English or Bilingual)    Hearing Difficulty or Deaf: no   Wear Glasses or Blind: no    Cognitive  Cognitive/Neuro/Behavioral: .WDL except, speech                 Speech: other (see comments), word-finding difficulty    Living Environment:   People in home: alone     Current living Arrangements: apartment      Able to return to prior arrangements: yes       Family/Social Support:  Care provided by: self, other (see comments) (has an [facility] assisting)  Provides care for: no one  Marital Status:   Other (specify)          Description of Support System: Supportive         Current Resources:   Patient receiving home care services:       Community Resources:    Equipment currently used at home: walker, rolling, cane, straight, grab bar, tub/shower, grab bar, toilet  Supplies currently used at home:      Employment/Financial:  Employment Status: unemployed        Financial Concerns:             Lifestyle & Psychosocial Needs:  Social Determinants of Health     Tobacco Use: Medium Risk     Smoking Tobacco Use: Former Smoker     Smokeless Tobacco Use: Never Used   Alcohol Use: Not on file   Financial Resource Strain: Not on file   Food Insecurity: Not on file   Transportation Needs: Not on file   Physical Activity: Not on file   Stress: Not on file   Social Connections: Not on file   Intimate Partner Violence: Not on file   Depression: Not at risk     PHQ-2 Score: 0   Housing Stability: Not on file       Functional Status:  Prior to admission patient needed assistance:              Mental Health  Status:          Chemical Dependency Status:                Values/Beliefs:  Spiritual, Cultural Beliefs, Catholic Practices, Values that affect care:                 Additional Information:  Consult for discharge planning. Patient admitted on 3/17/22 for right reverse shoulder and a tentative discharge date of 3/19/22.  Writer reviewed chart and TCU recommendations at discharge. Writer met with patient at bedside and introduced self and role.     Writer reviewed recommendations for TCU or home with 24/7 assist which patient does not have at discharge. Patient in agreement for TCU at discharge and would like referrals sent to [facility] and [facility]. Referrals sent via [facility].     Patient has been vaccinated and boosteds for COVID. Writer encouraged patient and/or family to reach out to facility directly for most up to date visitor guidelines.     Writer discussed transportation options and possible out of pocket costs of transport with patient. Patient would like medical transport set up at discharge.       CARMENCITA Castellanos

## 2022-03-18 NOTE — PROGRESS NOTES
[facility]      OUTPATIENT OCCUPATIONAL THERAPY  EVALUATION  PLAN OF TREATMENT FOR OUTPATIENT REHABILITATION  (COMPLETE FOR INITIAL CLAIMS ONLY)  Patient's Last Name, First Name, M.I.  YOB: 1940  Bhaskar Ott                          Provider's Name  [facility] Medical Record No.  [id number]                               Onset Date:  03/17/22   Start of Care Date:  03/18/22     Type:     ___PT   _X_OT   ___SLP Medical Diagnosis:  s/p RIGHT REVERSE SHOULDR ARTHROPLASTY                         OT Diagnosis:   Impaired I with ADL's and functional mobility   Visits from SOC:  1   _________________________________________________________________________________  Plan of Treatment/Functional Goals    Planned Interventions:     Goals: See Occupational Therapy Goals on Care Plan in [facility] electronic health record.    Therapy Frequency:    Predicted Duration of Therapy Intervention:    _________________________________________________________________________________    I CERTIFY THE NEED FOR THESE SERVICES FURNISHED UNDER        THIS PLAN OF TREATMENT AND WHILE UNDER MY CARE     (Physician co-signature of this document indicates review and certification of the therapy plan).                Certification date from: 03/18/22, Certification date to: 03/18/22    Referring Physician: Lisa Dinh PA-C            Initial Assessment        See Occupational Therapy evaluation dated 03/18/22 in Epic electronic health record.

## 2022-03-18 NOTE — PROGRESS NOTES
"   03/18/22 1615   Quick Adds   Type of Visit Initial PT Evaluation   Living Environment   People in Home alone   Current Living Arrangements apartment   Home Accessibility no concerns   Living Environment Comments From home alone, indepedently.    Self-Care   Usual Activity Tolerance good   Current Activity Tolerance fair   Equipment Currently Used at Home walker, rolling;cane, straight;grab bar, tub/shower;grab bar, toilet   Fall history within last six months no   Activity/Exercise/Self-Care Comment Patient uses 4WW for most mobility, can sometimes.    General Information   Onset of Illness/Injury or Date of Surgery 03/17/22   Referring Physician Lisa Dinh PA-C   Patient/Family Therapy Goals Statement (PT) to walk more    Pertinent History of Current Problem (include personal factors and/or comorbidities that impact the POC) S/p right reverse TSA    Existing Precautions/Restrictions weight bearing   Weight-Bearing Status - RUE nonweight-bearing   Weight-Bearing Status - LLE full weight-bearing   Weight-Bearing Status - RLE full weight-bearing   General Observations R sling, donned    Cognition   Orientation Status (Cognition) oriented to;person;place;verbal cues/prompts needed for orientation;situation;time   Cognitive Status Comments Doesn't answer all questions, says \"yeah\" to a lot of questions    Pain Assessment   Patient Currently in Pain Yes, see Vital Sign flowsheet  (tolerable )   Strength (Manual Muscle Testing)   Strength Comments generalized weakness, affected by poor coordination    Bed Mobility   Comment, (Bed Mobility) CGA to EOB, cuing to not use R UE. Scoot and otherwise moves well    Transfers   Comment, (Transfers) CGA with significant cuing for use/placement of cane. Unsteady upon standing up.    Gait/Stairs (Locomotion)   [place] Level (Gait) minimum assist (75% patient effort)   Assistive Device (Gait) cane, straight   Distance in Feet (Required for LE Total Joints) 20ft  "   Deviations/Abnormal Patterns (Gait) ataxic   Comment, (Gait/Stairs) NBOS with ataxic pattern, poor coordination with LE placement and cane, demonstrating unsteadiness which needs min assist to correct to maintain balance    Balance   Balance other (describe)   Balance Comments poor standing balance, needs min assist to remain standing    Clinical Impression   Criteria for Skilled Therapeutic Intervention Yes, treatment indicated   PT Diagnosis (PT) impaired functional mobility    Influenced by the following impairments decreased strength, decreased activity tolerance, decreased coordination, decreased safety  awareness   Functional limitations due to impairments bed mobility, transfers, ambulation    Clinical Presentation (PT Evaluation Complexity) Stable/Uncomplicated   Clinical Presentation Rationale clinical judgement    Clinical Decision Making (Complexity) low complexity   Planned Therapy Interventions (PT) balance training;bed mobility training;gait training;home exercise program;neuromuscular re-education;orthotic fitting/training;patient/family education;strengthening;transfer training   Anticipated Equipment Needs at Discharge (PT) cane, straight   Risk & Benefits of therapy have been explained evaluation/treatment results reviewed;care plan/treatment goals reviewed;risks/benefits reviewed;current/potential barriers reviewed;participants voiced agreement with care plan;participants included;patient   PT Discharge Planning   PT Rationale for DC Rec Patient is not anticipated to be able to perform functional mobility safely independently at home as he currently needs min assist for ambulation w/ cane and demonstrates poor safety awareness with use of R UE. Pt would benefit from TCU stay to address this vs. 24/7 assist at home (unlikely to be available). Pt would otherwise be a high falls risk and high readmission rate to hospital.    PT Brief overview of current status CGA bed mobility, min assist transfers  and ambulation w/ significant cuing    Total Evaluation Time   Total Evaluation Time (Minutes) 15   Physical Therapy Goals   PT Frequency Daily   PT Predicated Duration/Target Date for Goal Attainment 03/25/22   PT Goals Transfers;Gait;Bed Mobility   PT: Bed Mobility Independent   PT: Transfers Modified independent;Assistive device   PT: Gait Modified independent;Straight cane;150 feet       Carmela Reeves, PT

## 2022-03-18 NOTE — PROGRESS NOTES
[facility]    Hospitalist Progress Note    Interval History   - Abdominal exam improved today, appears that bowel distension yesterday was due to bowel gas, which is resolved today. No hepatosplenomegaly seen on US yesterday and LFTs are normal so no further workup needed  - Patient is medically stable to discharge, per orthopedics    Assessment & Plan   Summary: Bhaskar Ott is a 81 year old male with PMH alcohol abuse in remission, hx tobacco use, cognitive impairment, HTN, prostate cancer, BPH, OAB, who was admitted on 3/17/2022 following right reverse shoulder arthroplasty. Hospitalist service consulted for medical management.    OA s/p right reverse shoulder arthoplasty 3/17/2022  Procedure was performed under general anesthesia with an EBL of 125ml. No intra op complications were identified.   - Management per ortho. Discharging today    Abdominal distension, resolved  Appeared to have hepatosplenomegaly on exam 3/17. Abdominal exam improved today, appears that bowel distension yesterday was due to bowel gas, which is resolved today. No hepatosplenomegaly seen on US yesterday and LFTs are normal so no further workup needed    Trace LE edema  - Revisit as outpatient with PCP  - Consider alternative to amlodipine at discharge    Chronic/Stable/Resolved    BPH  Hx prostate cancer   OAB  - Resume PTA meds at discharge    Chronic pain syndrome   On buprenorphine patch, oxycodone, gabapentin PTA.     Reactive airway disease. Reports rare inhaler use at home. No evidence of acute issues.     HLD. Continue statin    GERD. Continue PPI     Hx alcohol abuse in remission. Reports sobriety for several years    Nicotine dependence. Uses inhaler at home. Can use patch if needed inpatient     Code Status: Full Code  PT/OT: orderedd  Diet: Advance Diet as Tolerated: Regular Diet Adult  Diet      Disposition: Expected discharge per orthopedics    Yomi Gibson MD  Text Page  (7am to  6pm)  -Data reviewed today: I reviewed all new labs and imaging results over the last 24 hours.    Physical Exam   Temp: 98.6  F (37  C) Temp src: Oral BP: 109/58 Pulse: 69   Resp: 16 SpO2: 98 % O2 Device: None (Room air)    Vitals:    03/17/22 0609   Weight: 60.8 kg (134 lb)     Vital Signs with Ranges  Temp:  [97.2  F (36.2  C)-98.6  F (37  C)] 98.6  F (37  C)  Pulse:  [66-77] 69  Resp:  [9-16] 16  BP: (104-145)/(58-80) 109/58  SpO2:  [97 %-100 %] 98 %  I/O last 3 completed shifts:  In: 2550 [P.O.:1200; I.V.:1350]  Out: 2500 [Urine:2375; Blood:125]  O2 requirements: none    Constitutional: Male in NAD  HEENT: Eyes nonicteric, oral mucosa moist  Cardiovascular: RRR, normal S1/2, no m/r/g  Respiratory: CTAB, no wheezing or crackles  Vascular: Trace LE pitting edema  GI: Normoactive bowel sounds, nontender, no hepatosplenomegaly  Skin/Integumen: No rashes, right shoulder brace and dressings  Neuro/Psych: Appropriate affect and mood, slightly forgetful, moves all extremities    Medications     lactated ringers 100 mL/hr at 03/17/22 2251       acetaminophen  975 mg Oral Q8H     amLODIPine  5 mg Oral Daily     aspirin  81 mg Oral BID     finasteride  5 mg Oral Daily     pantoprazole  40 mg Oral BID AC     pravastatin  40 mg Oral At Bedtime     senna-docusate  1 tablet Oral BID     sodium chloride (PF)  3 mL Intracatheter Q8H     tamsulosin  0.4 mg Oral BID       Data   Recent Labs   Lab 03/18/22  0823 03/17/22  1912 03/17/22  0609   HGB 10.0*  --   --    NA  --  137  --    POTASSIUM  --  4.4 4.1   CHLORIDE  --  105  --    CO2  --  26  --    BUN  --  30  --    CR  --  0.98  --    ANIONGAP  --  6  --    TRACEY  --  8.8  --    GLC 99 146*  --    ALBUMIN  --  3.4  --    PROTTOTAL  --  6.7*  --    BILITOTAL  --  0.4  --    ALKPHOS  --  81  --    ALT  --  30  --    AST  --  35  --        Imaging:   Recent Results (from the past 24 hour(s))   US Abdomen Complete    Narrative    US ABDOMEN COMPLETE 3/17/2022 3:52 PM    CLINICAL  HISTORY: Question splenomegaly on exam.    TECHNIQUE: Complete abdominal ultrasound.    COMPARISON: None.    FINDINGS:    GALLBLADDER: Normal. No gallstones, wall thickening, or  pericholecystic fluid. Negative sonographic Calvo's sign.    BILE DUCTS: No biliary dilatation. The common duct measures 2 mm.    LIVER: Unremarkable right lobe of liver, left lobe obscured by bowel  gas. Liver cysts noted measuring 2 cm on the right. No solid lesions  demonstrated.    RIGHT KIDNEY: Normal size. Multiple cysts measuring up to 2.1 cm.     LEFT KIDNEY: Not seen due to overlying bowel gas.    SPLEEN: Visualized portions unremarkable, splenomegaly not evaluated  as portions are obscured by bowel gas.    PANCREAS: Completely obscured by bowel gas.    AORTA: Normal in caliber.     IVC: Normal where visualized.    No ascites.      Impression    IMPRESSION:  Limited exam secondary to bowel gas, portions of both liver and spleen  not visualized.    CHITRA SANDERS MD         SYSTEM ID:  [id number]

## 2022-03-18 NOTE — PLAN OF CARE
Goal Outcome Evaluation:      Patient vital signs are at baseline: Yes  Patient able to ambulate as they were prior to admission or with assist devices provided by therapies during their stay:  Yes  Patient MUST void prior to discharge:  Yes  Patient able to tolerate oral intake:  Yes  Pain has adequate pain control using Oral analgesics:  Yes, PRN oxycodone, atarax, and scheduled tylenol.  Does patient have an identified :  No  Has goal D/C date and time been discussed with patient:  Yes     Pt A&Ox4. Iv saline locked. Cms intact. Dressing CDI. VSS-RA. Continue to monitor.

## 2022-03-18 NOTE — PROGRESS NOTES
A&Ox3, some forgetfulness. VSS on RA. Walked in [place] with PT.  Encourage 3-4 walks per day.  Awaiting placement for therapy. Referrals sent. Pain managed with PRN oxy.

## 2022-03-18 NOTE — PROGRESS NOTES
03/18/22 0850   Quick Adds   Quick Adds Certification   Type of Visit Initial Occupational Therapy Evaluation   Living Environment   People in Home alone   Current Living Arrangements apartment   Home Accessibility no concerns   Living Environment Comments Pt lives in hi rise apartment w/ single level. Pt has walk in shower w/ grab bars and shower chair. Pt has raised toilet seat w/ grab bars   Self-Care   Usual Activity Tolerance good   Current Activity Tolerance fair   Equipment Currently Used at Home walker, rolling;cane, straight;grab bar, tub/shower;grab bar, toilet   Fall history within last six months no   Activity/Exercise/Self-Care Comment Pt reports ind in all ADLs at baseline    Instrumental Activities of Daily Living (IADL)   IADL Comments P reports ind in all IADLs at baseline    General Information   Onset of Illness/Injury or Date of Surgery 03/17/22   Referring Physician Lisa Dinh PA-C   Patient/Family Therapy Goal Statement (OT) Return home    Additional Occupational Profile Info/Pertinent History of Current Problem R TSA   Existing Precautions/Restrictions fall;shoulder   Right Upper Extremity (Weight-bearing Status) non weight-bearing (NWB)   Cognitive Status Examination   Orientation Status orientation to person, place and time   Safety Deficit moderate deficit;awareness of need for assistance   Cognitive Status Comments Pt oriented to place and situation but exhibited impaired safety awareness and need for assistance in ambulation/ ADLs.    Sensory   Sensory Quick Adds No deficits were identified   Pain Assessment   Patient Currently in Pain Yes, see Vital Sign flowsheet   Range of Motion Comprehensive   General Range of Motion upper extremity range of motion deficits identified   General Upper Extremity Assessment (Range of Motion)   Upper Extremity: Range of Motion shoulder, right: UE ROM   Coordination   Upper Extremity Coordination No deficits were identified   Bed Mobility   Bed  Mobility supine-sit;sit-supine   Supine-Sit [place] (Bed Mobility) supervision   Sit-Supine [place] (Bed Mobility) supervision   Transfers   Transfers sit-stand transfer;toilet transfer   Sit-Stand Transfer   Sit-Stand [place] (Transfers) contact guard   Toilet Transfer   Type (Toilet Transfer) stand-sit;sit-stand   [place] Level (Toilet Transfer) contact guard   Activities of Daily Living   BADL Assessment/Intervention upper body dressing;lower body dressing;toileting   Upper Body Dressing Assessment/Training   [place] Level (Upper Body Dressing) minimum assist (75% patient effort)   Lower Body Dressing Assessment/Training   [place] Level (Lower Body Dressing) moderate assist (50% patient effort)   Toileting   [place] Level (Toileting) moderate assist (50% patient effort)   Clinical Impression   Criteria for Skilled Therapeutic Interventions Met (OT) Yes, treatment indicated   OT Diagnosis Decreased ADL/IADL ind and safety   Influenced by the following impairments pain,  RUE ROM, post surgical precautions    OT Problem List-Impairments impacting ADL problems related to;activity tolerance impaired;range of motion (ROM);strength;post-surgical precautions;pain   Assessment of Occupational Performance 3-5 Performance Deficits   Identified Performance Deficits dressing, functional mobility, toileting    Planned Therapy Interventions (OT) ADL retraining;bed mobility training;strengthening;transfer training;orthoic fitting/training   Clinical Decision Making Complexity (OT) moderate complexity   Risk & Benefits of therapy have been explained evaluation/treatment results reviewed;care plan/treatment goals reviewed;patient;participants included;participants voiced agreement with care plan;current/potential barriers reviewed;risks/benefits reviewed   OT Discharge Planning   OT Rationale for DC Rec Pt is requiring SBA in functional mobility and min A in ADLs including toileting, dressing,  transfers, and donning/dogging orthosis. Pt is exhibiting cognitive/safety defecits and need for assist in ambulation and for ADLs. Reccomend pt d/c to TCU to further skilled therapy to increase ind and safety. Plan discussed w/ PA who is in agreement w/ plan for TCU or home w/ assist. If going home, pt would need 24/7 assist in dressing, showering, transferring, toileting and all IADLs   Therapy Certification   Start of Care Date 03/18/22   Certification date from 03/18/22   Certification date to 03/18/22   Medical Diagnosis s/p RIGHT REVERSE SHOULDR ARTHROPLASTY    OT Goals   Therapy Frequency (OT) 2 times/day   OT Predicated Duration/Target Date for Goal Attainment 03/20/22   OT Goals Upper Body Dressing;Lower Body Dressing;Bed Mobility;Toilet Transfer/Toileting;Transfers;Hygiene/Grooming   OT: Hygiene/Grooming supervision/stand-by assist;while standing;within precautions   OT: Upper Body Dressing Supervision/stand-by assist;including orthotic;within precautions;including set-up/clothing retrieval   OT: Lower Body Dressing Supervision/stand-by assist;using adaptive equipment;within precautions;including set-up/clothing retrieval   OT: Bed Mobility Supervision/stand-by assist;supine to/from sitting   OT: Transfer Supervision/stand-by assist;within precautions   OT: Toilet Transfer/Toileting Supervision/stand-by assist;cleaning and garment management;toilet transfer;within precautions   OT: Cognitive Patient/caregiver will verbalize understanding of cognitive assessment results/recommendations as needed for safe discharge planning

## 2022-03-18 NOTE — PROGRESS NOTES
Call from Summer, who is patient's [facility] Care Coordinator. She confirmed that patient will need placement at discharge. She would like to be notified of discharge plans at [phone].

## 2022-03-19 LAB
GLUCOSE BLD-MCNC: 93 MG/DL (ref 70–99)
HGB BLD-MCNC: 10.9 G/DL (ref 13.3–17.7)

## 2022-03-19 PROCEDURE — 36415 COLL VENOUS BLD VENIPUNCTURE: CPT | Performed by: STUDENT IN AN ORGANIZED HEALTH CARE EDUCATION/TRAINING PROGRAM

## 2022-03-19 PROCEDURE — 250N000013 HC RX MED GY IP 250 OP 250 PS 637: Performed by: STUDENT IN AN ORGANIZED HEALTH CARE EDUCATION/TRAINING PROGRAM

## 2022-03-19 PROCEDURE — 85018 HEMOGLOBIN: CPT | Performed by: STUDENT IN AN ORGANIZED HEALTH CARE EDUCATION/TRAINING PROGRAM

## 2022-03-19 PROCEDURE — 120N000001 HC R&B MED SURG/OB

## 2022-03-19 PROCEDURE — 82947 ASSAY GLUCOSE BLOOD QUANT: CPT | Performed by: ORTHOPAEDIC SURGERY

## 2022-03-19 PROCEDURE — 250N000013 HC RX MED GY IP 250 OP 250 PS 637: Performed by: PHYSICIAN ASSISTANT

## 2022-03-19 PROCEDURE — 99207 PR NO CHARGE LOS: CPT | Performed by: INTERNAL MEDICINE

## 2022-03-19 RX ADMIN — ASPIRIN 81 MG: 81 TABLET, COATED ORAL at 20:39

## 2022-03-19 RX ADMIN — SENNOSIDES AND DOCUSATE SODIUM 1 TABLET: 50; 8.6 TABLET ORAL at 20:39

## 2022-03-19 RX ADMIN — TAMSULOSIN HYDROCHLORIDE 0.4 MG: 0.4 CAPSULE ORAL at 08:39

## 2022-03-19 RX ADMIN — OXYCODONE HYDROCHLORIDE 10 MG: 5 TABLET ORAL at 00:45

## 2022-03-19 RX ADMIN — FINASTERIDE 5 MG: 5 TABLET, FILM COATED ORAL at 08:38

## 2022-03-19 RX ADMIN — ACETAMINOPHEN 975 MG: 325 TABLET, FILM COATED ORAL at 10:14

## 2022-03-19 RX ADMIN — TAMSULOSIN HYDROCHLORIDE 0.4 MG: 0.4 CAPSULE ORAL at 17:18

## 2022-03-19 RX ADMIN — HYDROXYZINE HYDROCHLORIDE 10 MG: 10 TABLET ORAL at 00:03

## 2022-03-19 RX ADMIN — PRAVASTATIN SODIUM 40 MG: 40 TABLET ORAL at 20:38

## 2022-03-19 RX ADMIN — PANTOPRAZOLE SODIUM 40 MG: 40 TABLET, DELAYED RELEASE ORAL at 16:26

## 2022-03-19 RX ADMIN — ACETAMINOPHEN 975 MG: 325 TABLET, FILM COATED ORAL at 17:18

## 2022-03-19 RX ADMIN — OXYCODONE HYDROCHLORIDE 10 MG: 5 TABLET ORAL at 16:26

## 2022-03-19 RX ADMIN — OXYCODONE HYDROCHLORIDE 10 MG: 5 TABLET ORAL at 20:38

## 2022-03-19 RX ADMIN — ACETAMINOPHEN 975 MG: 325 TABLET, FILM COATED ORAL at 01:44

## 2022-03-19 RX ADMIN — OXYCODONE HYDROCHLORIDE 10 MG: 5 TABLET ORAL at 11:18

## 2022-03-19 RX ADMIN — OXYCODONE HYDROCHLORIDE 10 MG: 5 TABLET ORAL at 07:08

## 2022-03-19 RX ADMIN — PANTOPRAZOLE SODIUM 40 MG: 40 TABLET, DELAYED RELEASE ORAL at 07:08

## 2022-03-19 RX ADMIN — HYDROXYZINE HYDROCHLORIDE 10 MG: 10 TABLET ORAL at 10:14

## 2022-03-19 RX ADMIN — ASPIRIN 81 MG: 81 TABLET, COATED ORAL at 08:39

## 2022-03-19 RX ADMIN — SENNOSIDES AND DOCUSATE SODIUM 1 TABLET: 50; 8.6 TABLET ORAL at 08:38

## 2022-03-19 ASSESSMENT — ACTIVITIES OF DAILY LIVING (ADL)
ADLS_ACUITY_SCORE: 3

## 2022-03-19 NOTE — PROGRESS NOTES
"ORTHOPEDIC UPPER EXTREMITY PROGRESS NOTE    POD# 2  Patient is a 81 year old male who underwent Procedure(s):  RIGHT REVERSE SHOULDR ARTHROPLASTY WITH CUSTOM GUIDE on 3/17/2022 on right. Pain is decently controlled. Waiting on placement.  Discharge instructions reviewed.    Vitals:   /67   Pulse 80   Temp 99.2  F (37.3  C)   Resp 18   Ht 1.6 m (5' 3\")   Wt 60.8 kg (134 lb)   SpO2 98%   BMI 23.74 kg/m        EXAM   The patient is awake and alert.   Sensation is intact.  Digital Flexion/Extension maintained.   Brisk cap refill.   The incision is covered, CDI    Labs:   Recent Labs   Lab Test 03/18/22  0823 03/05/21  1230 12/11/20  1405 08/30/15  1358 08/30/15  0725 08/29/15  0822 08/28/15  1148 08/28/15  0342   HGB 10.0* 11.6* 11.7*   < > 7.5* 8.0*   < > 7.3*   INR  --   --   --   --  1.16* 1.28*  --  1.40*    < > = values in this interval not displayed.       ASSESSMENT  S/p right reverse TSA   PLAN  1. Continue with shoulder immobilizer  2. Weight Bearing NWB  3. Wound Care leave undisturbed  4. Discharge anticipated date today pending placement  5. Cont Pain Control oxycodone    Kjerstin Foss PA-C [facility]    "

## 2022-03-19 NOTE — PLAN OF CARE
Goal Outcome Evaluation:  Patient vital signs are at baseline: Yes  Patient able to ambulate as they were prior to admission or with assist devices provided by therapies during their stay:  No,  Reason:  TCU  Patient MUST void prior to discharge:  Yes  Patient able to tolerate oral intake:  Yes  Pain has adequate pain control using Oral analgesics:  Yes  Does patient have an identified :  No,  Reason:  TCU  Has goal D/C date and time been discussed with patient:  No,  Reason:  pending placement.    Incision RYLAN.  Pain managed with oxy. Up SBA with cane. Voiding in urinal. Discharge pending to TCU.   Plan of Care Reviewed With: patient

## 2022-03-19 NOTE — PROGRESS NOTES
Brief Progress Note    Chart checked. No acute issues identified, awaiting dispo. Hospitalist service will sign off, please do note hesitate to consult us for any questions.    Yomi Gibson MD  Hospitalist

## 2022-03-19 NOTE — PROGRESS NOTES
Care Management Follow Up    Length of Stay (days): 2    Expected Discharge Date: 03/19/2022     Concerns to be Addressed:    Discharge Planning   Patient plan of care discussed at interdisciplinary rounds: Yes    Anticipated Discharge Disposition: Skilled Nursing Facilty     Anticipated Discharge Services: Transportation Services, Therapy  Anticipated Discharge DME: None    Patient/family educated on Medicare website which has current facility and service quality ratings: yes  Education Provided on the Discharge Plan:  Yes  Patient/Family in Agreement with the Plan: yes    Referrals Placed by CM/SW: Post Acute Facilities  Private pay costs discussed: Not applicable    Additional Information:  Spoke with patient regarding discharge plans.  Patient states he is interested in going to [facility] in [place].  Referral sent, via discharge on the double, to check bed availability.  Also sent a referral to [facility] (for [facility] liaison, to assist) to check bed availability.    Will continue to follow.      TIMOTHY Bernal, [facility]    [phone]  [facility]

## 2022-03-19 NOTE — PROGRESS NOTES
Alert and oriented x 3, forgetful and impulsive, gets OOB without using call light. SBA to toilet, voiding adequately. VSS. CMS intact. Dressing to RUE intact with sling on at all times. Pain managed with scheduled Tylenol and PRN Oxycodone. discharge pending TCU placement.

## 2022-03-20 ENCOUNTER — APPOINTMENT (OUTPATIENT)
Dept: PHYSICAL THERAPY | Facility: CLINIC | Age: 82
DRG: 483 | End: 2022-03-20
Attending: ORTHOPAEDIC SURGERY
Payer: COMMERCIAL

## 2022-03-20 PROCEDURE — 250N000013 HC RX MED GY IP 250 OP 250 PS 637: Performed by: PHYSICIAN ASSISTANT

## 2022-03-20 PROCEDURE — 250N000013 HC RX MED GY IP 250 OP 250 PS 637: Performed by: STUDENT IN AN ORGANIZED HEALTH CARE EDUCATION/TRAINING PROGRAM

## 2022-03-20 PROCEDURE — 97116 GAIT TRAINING THERAPY: CPT | Mod: GP

## 2022-03-20 PROCEDURE — 97530 THERAPEUTIC ACTIVITIES: CPT | Mod: GP

## 2022-03-20 PROCEDURE — 120N000001 HC R&B MED SURG/OB

## 2022-03-20 RX ADMIN — OXYCODONE HYDROCHLORIDE 10 MG: 5 TABLET ORAL at 00:24

## 2022-03-20 RX ADMIN — AMLODIPINE BESYLATE 5 MG: 5 TABLET ORAL at 09:02

## 2022-03-20 RX ADMIN — SENNOSIDES AND DOCUSATE SODIUM 1 TABLET: 50; 8.6 TABLET ORAL at 09:03

## 2022-03-20 RX ADMIN — OXYCODONE HYDROCHLORIDE 10 MG: 5 TABLET ORAL at 09:03

## 2022-03-20 RX ADMIN — OXYCODONE HYDROCHLORIDE 10 MG: 5 TABLET ORAL at 05:14

## 2022-03-20 RX ADMIN — TAMSULOSIN HYDROCHLORIDE 0.4 MG: 0.4 CAPSULE ORAL at 17:21

## 2022-03-20 RX ADMIN — FINASTERIDE 5 MG: 5 TABLET, FILM COATED ORAL at 09:02

## 2022-03-20 RX ADMIN — HYDROXYZINE HYDROCHLORIDE 10 MG: 10 TABLET ORAL at 12:19

## 2022-03-20 RX ADMIN — ASPIRIN 81 MG: 81 TABLET, COATED ORAL at 21:21

## 2022-03-20 RX ADMIN — PANTOPRAZOLE SODIUM 40 MG: 40 TABLET, DELAYED RELEASE ORAL at 17:21

## 2022-03-20 RX ADMIN — PANTOPRAZOLE SODIUM 40 MG: 40 TABLET, DELAYED RELEASE ORAL at 05:14

## 2022-03-20 RX ADMIN — PRAVASTATIN SODIUM 40 MG: 40 TABLET ORAL at 21:21

## 2022-03-20 RX ADMIN — HYDROXYZINE HYDROCHLORIDE 10 MG: 10 TABLET ORAL at 00:24

## 2022-03-20 RX ADMIN — ACETAMINOPHEN 975 MG: 325 TABLET, FILM COATED ORAL at 09:03

## 2022-03-20 RX ADMIN — ACETAMINOPHEN 975 MG: 325 TABLET, FILM COATED ORAL at 01:51

## 2022-03-20 RX ADMIN — TAMSULOSIN HYDROCHLORIDE 0.4 MG: 0.4 CAPSULE ORAL at 09:03

## 2022-03-20 RX ADMIN — SENNOSIDES AND DOCUSATE SODIUM 1 TABLET: 50; 8.6 TABLET ORAL at 21:21

## 2022-03-20 RX ADMIN — OXYCODONE HYDROCHLORIDE 10 MG: 5 TABLET ORAL at 21:21

## 2022-03-20 RX ADMIN — ASPIRIN 81 MG: 81 TABLET, COATED ORAL at 09:03

## 2022-03-20 RX ADMIN — OXYCODONE HYDROCHLORIDE 10 MG: 5 TABLET ORAL at 17:21

## 2022-03-20 RX ADMIN — OXYCODONE HYDROCHLORIDE 10 MG: 5 TABLET ORAL at 13:14

## 2022-03-20 ASSESSMENT — ACTIVITIES OF DAILY LIVING (ADL)
ADLS_ACUITY_SCORE: 3

## 2022-03-20 NOTE — PLAN OF CARE
Goal Outcome Evaluation:  Patient vital signs are at baseline: Yes  Patient able to ambulate as they were prior to admission or with assist devices provided by therapies during their stay:  Yes  Patient MUST void prior to discharge:  Yes  Patient able to tolerate oral intake:  Yes  Pain has adequate pain control using Oral analgesics:  Yes  Does patient have an identified :  No,  Reason:  TCU  Has goal D/C date and time been discussed with patient:  No,  Reason:  Pending placement  A&O.  Incision RYLAN. Pain managed with oxy. Up SBA with cane. Discharge pending to TCU.    Plan of Care Reviewed With: patient

## 2022-03-20 NOTE — PROGRESS NOTES
Patient vital signs are at baseline: Yes  Patient able to ambulate as they were prior to admission or with assist devices provided by therapies during their stay:  Yes  Patient MUST void prior to discharge:  Yes  Patient able to tolerate oral intake:  Yes  Pain has adequate pain control using Oral analgesics:  Yes  Does patient have an identified :  No  Has goal D/C date and time been discussed with patient: No, pending TCU placement.   Alert and oriented x 4. VSS. CMS intact. Incision with sutures and liquid bandage RYLAN, sling on at all times. Assist of one/ SBA with ambulation to toilet.

## 2022-03-20 NOTE — PROGRESS NOTES
Patient vital signs are at baseline: Yes  Patient able to ambulate as they were prior to admission or with assist devices provided by therapies during their stay:  Yes  Patient MUST void prior to discharge:  Yes  Patient able to tolerate oral intake:  Yes  Pain has adequate pain control using Oral analgesics:  Yes  Does patient have an identified :  No, pending TCU placement  Has goal D/C date and time been discussed with patient: No, pending TCU

## 2022-03-21 ENCOUNTER — APPOINTMENT (OUTPATIENT)
Dept: OCCUPATIONAL THERAPY | Facility: CLINIC | Age: 82
DRG: 483 | End: 2022-03-21
Attending: ORTHOPAEDIC SURGERY
Payer: COMMERCIAL

## 2022-03-21 ENCOUNTER — APPOINTMENT (OUTPATIENT)
Dept: PHYSICAL THERAPY | Facility: CLINIC | Age: 82
DRG: 483 | End: 2022-03-21
Attending: ORTHOPAEDIC SURGERY
Payer: COMMERCIAL

## 2022-03-21 VITALS
RESPIRATION RATE: 16 BRPM | SYSTOLIC BLOOD PRESSURE: 149 MMHG | HEART RATE: 77 BPM | DIASTOLIC BLOOD PRESSURE: 77 MMHG | WEIGHT: 134 LBS | HEIGHT: 63 IN | TEMPERATURE: 98.5 F | OXYGEN SATURATION: 92 % | BODY MASS INDEX: 23.74 KG/M2

## 2022-03-21 PROCEDURE — 97530 THERAPEUTIC ACTIVITIES: CPT | Mod: GP | Performed by: PHYSICAL THERAPY ASSISTANT

## 2022-03-21 PROCEDURE — 97535 SELF CARE MNGMENT TRAINING: CPT | Mod: GO | Performed by: OCCUPATIONAL THERAPIST

## 2022-03-21 PROCEDURE — 250N000013 HC RX MED GY IP 250 OP 250 PS 637: Performed by: STUDENT IN AN ORGANIZED HEALTH CARE EDUCATION/TRAINING PROGRAM

## 2022-03-21 PROCEDURE — 97110 THERAPEUTIC EXERCISES: CPT | Mod: GO | Performed by: OCCUPATIONAL THERAPIST

## 2022-03-21 PROCEDURE — 250N000013 HC RX MED GY IP 250 OP 250 PS 637: Performed by: PHYSICIAN ASSISTANT

## 2022-03-21 PROCEDURE — 97116 GAIT TRAINING THERAPY: CPT | Mod: GP | Performed by: PHYSICAL THERAPY ASSISTANT

## 2022-03-21 RX ORDER — BUPRENORPHINE 10 UG/H
1 PATCH TRANSDERMAL
Start: 2022-03-21 | End: 2022-11-18

## 2022-03-21 RX ORDER — AMOXICILLIN 250 MG
1 CAPSULE ORAL 2 TIMES DAILY PRN
Qty: 30 TABLET | Refills: 0 | DISCHARGE
Start: 2022-03-21

## 2022-03-21 RX ADMIN — ASPIRIN 81 MG: 81 TABLET, COATED ORAL at 08:05

## 2022-03-21 RX ADMIN — SENNOSIDES AND DOCUSATE SODIUM 1 TABLET: 50; 8.6 TABLET ORAL at 08:05

## 2022-03-21 RX ADMIN — AMLODIPINE BESYLATE 5 MG: 5 TABLET ORAL at 08:05

## 2022-03-21 RX ADMIN — TAMSULOSIN HYDROCHLORIDE 0.4 MG: 0.4 CAPSULE ORAL at 08:05

## 2022-03-21 RX ADMIN — FINASTERIDE 5 MG: 5 TABLET, FILM COATED ORAL at 08:05

## 2022-03-21 RX ADMIN — OXYCODONE HYDROCHLORIDE 10 MG: 5 TABLET ORAL at 05:29

## 2022-03-21 RX ADMIN — PANTOPRAZOLE SODIUM 40 MG: 40 TABLET, DELAYED RELEASE ORAL at 05:29

## 2022-03-21 RX ADMIN — OXYCODONE HYDROCHLORIDE 10 MG: 5 TABLET ORAL at 01:18

## 2022-03-21 ASSESSMENT — ACTIVITIES OF DAILY LIVING (ADL)
ADLS_ACUITY_SCORE: 5
ADLS_ACUITY_SCORE: 3
ADLS_ACUITY_SCORE: 5
ADLS_ACUITY_SCORE: 5
ADLS_ACUITY_SCORE: 3
ADLS_ACUITY_SCORE: 3
ADLS_ACUITY_SCORE: 5
ADLS_ACUITY_SCORE: 3
ADLS_ACUITY_SCORE: 5
ADLS_ACUITY_SCORE: 3
ADLS_ACUITY_SCORE: 5
ADLS_ACUITY_SCORE: 3

## 2022-03-21 NOTE — PROGRESS NOTES
Care Management Discharge Note    Discharge Date: 03/22/2022       Discharge Disposition: Skilled Nursing Facilty    Discharge Services: Transportation Services    Discharge DME: None    Discharge Transportation:      Private pay costs discussed: transportation costs, patient has insurance that will cover cost of ride.     PAS Confirmation Code: [id number]  Patient/family educated on Medicare website which has current facility and service quality ratings: yes    Education Provided on the Discharge Plan:    Persons Notified of Discharge Plans: yes  Patient/Family in Agreement with the Plan: yes    Handoff Referral Completed: Yes    Additional Information:  [facility]. Writer spoke to patient who is in agreement of this plan and will need a ride scheduled to get there. Call to  [facility] and wheelchair ride scheduled for today at 1545. Facility updated. PAS completed. Orders received for discharge and sent to liaison Linda.     PAS-RR    D: Per DHS regulation, SW completed and submitted PAS-RR to [place] [employer] Direct Connect via the Senior [employer] Line.  PAS-RR confirmation # is : [id number]    I: SW spoke with patient and they are aware a PAS-RR has been submitted.  SW reviewed with patient that they may be contacted for a follow up appointment within 10 days of hospital discharge if their SNF stay is < 30 days.  Contact information for Senior [employer] Line was also provided.    A: patient verbalized understanding.    P: Further questions may be directed to Senior [employer] Line at #[phone], option #4 for PAS-RR staff.     Call placed to Summer, patient's [facility]  [phone] to update on discharge plan. Message left.        CARMENCITA Castellanos

## 2022-03-21 NOTE — PROGRESS NOTES
A&O x 4 forgetful at times, VSS on RA, pain controlled with oral analgesics, drsg CDI, CMS intact per baseline, up with assist of SB, voiding adequately in urinal to discharge to TCU this afternoon, continue to monitor.

## 2022-03-21 NOTE — PROGRESS NOTES
Alert and oriented x 4. VSS. CMS intact. Dressing intact, sutures with liquid bandage. Pain managed with oxycodone. Assist of one/SBA with transfers and ambulation to bathroom. Discharge pending TCU placement.

## 2022-03-21 NOTE — PROGRESS NOTES
"ORTHOPEDIC UPPER EXTREMITY PROGRESS NOTE    POD# 4  Patient is a 81 year old male who underwent Procedure(s):  RIGHT REVERSE SHOULDR ARTHROPLASTY WITH CUSTOM GUIDE on 3/17/2022 on right. Pain is well controlled. Waiting on placement.  Voiding well. Discharge instructions reviewed.    Vitals:   BP (!) 149/77 (BP Location: Left arm)   Pulse 77   Temp 98.5  F (36.9  C) (Oral)   Resp 16   Ht 1.6 m (5' 3\")   Wt 60.8 kg (134 lb)   SpO2 92%   BMI 23.74 kg/m        EXAM   The patient is awake and alert. Sitting up in chair eating breakfast.  Sensation is intact.  Digital Flexion/Extension maintained.   Brisk cap refill.   The incision is covered, CDI    Labs:   Recent Labs   Lab Test 03/19/22  0716 03/18/22  0823 03/05/21  1230 08/30/15  1358 08/30/15  0725 08/29/15  0822 08/28/15  1148 08/28/15  0342   HGB 10.9* 10.0* 11.6*   < > 7.5* 8.0*   < > 7.3*   INR  --   --   --   --  1.16* 1.28*  --  1.40*    < > = values in this interval not displayed.       ASSESSMENT  S/p right reverse TSA   PLAN  1. Continue with shoulder immobilizer  2. Weight Bearing NWB  3. Wound Care leave undisturbed  4. Discharge anticipated date today pending placement.  Appreciate SW assistance  5. Cont Pain Control oxycodone    Beverly Rowan PA-C  [facility]        "

## 2022-03-21 NOTE — PROGRESS NOTES
Patient vital signs are at baseline: Yes  Patient able to ambulate as they were prior to admission or with assist devices provided by therapies during their stay:  Yes  Patient MUST void prior to discharge:  Yes  Patient able to tolerate oral intake:  Yes  Pain has adequate pain control using Oral analgesics:  Yes, oxycodone q 4H PRN  Does patient have an identified :  No,  Reason:  TCU  Has goal D/C date and time been discussed with patient:  Yes, pending TCU placement    Alert and oriented x 4. Assist of one/SBA to BR, uses urinal at times. RUE incision, sutures with liquid bandage, CDI. CMS intact.

## 2022-03-22 ENCOUNTER — PATIENT OUTREACH (OUTPATIENT)
Dept: CARE COORDINATION | Facility: CLINIC | Age: 82
End: 2022-03-22
Payer: COMMERCIAL

## 2022-03-22 NOTE — PROGRESS NOTES
Clinic Care Coordination Contact  [facility]/Voicemail       Clinical Data: CHW Outreach  Outreach attempted x 1. Left message on patient's voicemail with call back information and requested return call.    Plan: CHW will try to reach patient again in 1-2 business days.    Notes:  -schedule with RN (NE)  -patient had shoulder surgery    Brenda Sykes  Community Health Worker   [facility]  Care Coordination  [facility]  [email or web address]   Office: [phone]

## 2022-03-22 NOTE — PLAN OF CARE
Physical Therapy Discharge Summary    Reason for therapy discharge:    Discharged to transitional care facility.    Progress towards therapy goal(s). See goals on Care Plan in [facility] electronic health record for goal details.  Goals partially met.  Barriers to achieving goals:   discharge from facility.    Therapy recommendation(s):    Continued therapy is recommended.  Rationale/Recommendations:  to address continued decreased functional mobility.    Carmela Reeves, PT

## 2022-03-22 NOTE — LETTER
[facility] CARE COORDINATION  [address] [place] [address]    March 23, 2022    Bhaskar Ott  [address]    [place] [address]      Dear Bhaskar,    I am a clinic community health worker who works with Dayday Alberts MD at [facility]. I have been trying to reach you recently to introduce Clinic Care Coordination and to see if there was anything I could assist you with.  Below is a description of clinic care coordination and how I can further assist you.      The clinic care coordination team is made up of a registered nurse,  and community health worker who understand the health care system. The goal of clinic care coordination is to help you manage your health and improve access to the health care system in the most efficient manner. The team can assist you in meeting your health care goals by providing education, coordinating services, strengthening the communication among your providers and supporting you with any resource needs.    Please feel free to contact me at [phone] with any questions or concerns. We are focused on providing you with the highest-quality healthcare experience possible and that all starts with you.     Sincerely,     Brenda Sykes  Community Health Worker   [facility]  Care Coordination  Saud Jimenez Rogers, [place], [facility]  [email or web address]   Office: [phone]

## 2022-03-22 NOTE — PLAN OF CARE
Occupational Therapy Discharge Summary    Reason for therapy discharge:    Discharged to transitional care facility.    Progress towards therapy goal(s). See goals on Care Plan in [facility] electronic health record for goal details.  Goals partially met.  Barriers to achieving goals:   discharge from facility.    Therapy recommendation(s):    Continued therapy is recommended.  Rationale/Recommendations:  Per chart review, pt below baseline level of function and would benefit from continued OT to progress to prior level of function.

## 2022-03-23 NOTE — PROGRESS NOTES
Clinic Care Coordination Contact  [facility]/Voicemail       Clinical Data: CHW  Outreach  Outreach attempted x 2. Left message on patient's voicemail with call back information and requested return call.    Plan: CHW will send unable to contact letter with care coordinator contact information via mail. CHW will do no further outreaches at this time.    Brenda Sykes  Community Health Worker   [facility]  Care Coordination  [facility]  [email or web address]   Office: [phone]
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
## 2022-05-24 NOTE — PROGRESS NOTES
HPI:  Bhaskar Ott is a 81 year old male presenting for complete eye exam.    Prior patient of Dr. Chi, last visit 1/14/2020 with visually significant cataract right eye. Scheduled for cataract surgery 7/24/2020 but procedure cancelled day of surgery as no one to help patient postop, and patient decided to leave [facility] before procedure.    Has not seen an eye doctor since last time he was here. Will sometimes have an irritation left eye but vision is good. Irritation on and off for the past 2 months. Has not tried any treatments.  Right eye vision is poor. No eye pain.    Past Ocular History:  CE/IOL left eye 2/2016 - SN60WF lens  Corneal erosion left eye  Cataract right eye  Glaucoma suspect     PMH:  HTN  Hypercholesterolemia  Prostate cancer - on tamsulosin and oxybutynin  H/o alcohol abuse    SH:  Quit smoking years ago. Retired [employer] .    FH:  No known family history of blindness, glaucoma, macular degeneration  Mother - some eye problems, never diagnosed    ASSESSMENT and PLAN:  1. Combined form of age-related cataract, right eye  - visually significant with BCVA 20/600  - We discussed the risks, benefits and alternatives of cataract surgery, including risk of bleeding, infection, postoperative changes in intraocular pressure, postoperative inflammation, possibility of retinal detachment or vision loss. Discussed risk of PC tear, possibility of posterior disease currently not well visualized in setting of dense cataract. Discussed need for follow up appointments after surgery and the need for postoperative drops.  Informed consent was obtained.  The patient decided to proceed with CE/IOL OD.    We discussed lens options and refractive target. We discussed that by aiming for distance, will need glasses for near.  Target: -0.5  (has SN60WF targeting distance left eye 16.5D)    Dilates to: 5 mm  Alpha blockers/Flomax: YES  Trauma/Pseudoxfoliation: None  Fuchs dystrophy/guttae: None    Diabetes:  No  Anticoagulation: None    Cyl: up to 1.5D cyl on corneal topography/IOLM with difficulty opening eyes, deep sulcus, small PF, and squeezing; 1D on prior measurements -- will not pursue toric    Proceed with CE/IOL OD.    Surgical plan:  Topical  Malyugin ring  Trypan  Post-op acular     2. Pseudophakia, left eye  - lens in good position  - happy with vision  - monitor    3. Glaucoma suspect of both eyes  - large CD ratio each eye; clinically stable compared to prior exam  Fhx: negative  Pachy: 629/679 (2015)  Gonio:  Tmax:  Today's IOP: 7/11  Current Meds: none  Meds to Avoid: none  VF: first field 2019 --> plan to repeat s/p CE/IOL OD  Nerve OCT:   Last 2019 --> plan to repeat s/p CE/IOL OD      Follow up for CE/IOL right eye with POD1 appointment, no dilation or sooner PRN        -----------------------------------------------------------------------------------    Attestation:  Complete documentation of historical and exam elements from today's encounter can be found in the full encounter summary report (not reduplicated in this progress note). I personally obtained the chief complaint(s) and history of present illness.  I confirmed and edited as necessary the review of systems, past medical/surgical history, family history, social history, and examination findings as documented by others; and I examined the patient myself. I personally reviewed the relevant tests, images, and reports as documented above.     I formulated and edited as necessary the assessment and plan and discussed the findings and management plan with the patient and family.      Melodie Mak MD

## 2022-05-27 ENCOUNTER — OFFICE VISIT (OUTPATIENT)
Dept: OPHTHALMOLOGY | Facility: CLINIC | Age: 82
End: 2022-05-27
Attending: OPHTHALMOLOGY
Payer: COMMERCIAL

## 2022-05-27 DIAGNOSIS — Z96.1 PSEUDOPHAKIA, LEFT EYE: ICD-10-CM

## 2022-05-27 DIAGNOSIS — H40.003 GLAUCOMA SUSPECT OF BOTH EYES: ICD-10-CM

## 2022-05-27 DIAGNOSIS — H25.811 COMBINED FORM OF AGE-RELATED CATARACT, RIGHT EYE: Primary | ICD-10-CM

## 2022-05-27 PROCEDURE — 99214 OFFICE O/P EST MOD 30 MIN: CPT | Performed by: OPHTHALMOLOGY

## 2022-05-27 PROCEDURE — G0463 HOSPITAL OUTPT CLINIC VISIT: HCPCS

## 2022-05-27 ASSESSMENT — EXTERNAL EXAM - RIGHT EYE: OD_EXAM: NORMAL

## 2022-05-27 ASSESSMENT — CUP TO DISC RATIO
OS_RATIO: 0.65
OD_RATIO: 0.5

## 2022-05-27 ASSESSMENT — TONOMETRY
IOP_METHOD: APPLANATION
IOP_METHOD: TONOPEN
OD_IOP_MMHG: 6
OS_IOP_MMHG: UNAB
OS_IOP_MMHG: 11
OD_IOP_MMHG: 7

## 2022-05-27 ASSESSMENT — REFRACTION_WEARINGRX
OS_AXIS: 104
OS_SPHERE: -1.50
OS_CYLINDER: +1.00
OD_CYLINDER: +1.25
OD_AXIS: 095
OD_SPHERE: -1.00

## 2022-05-27 ASSESSMENT — REFRACTION_MANIFEST
OS_ADD: +2.50
OD_CYLINDER: +1.25
OS_CYLINDER: +1.25
OS_SPHERE: -2.25
OD_SPHERE: PLANO
OD_AXIS: 095
OS_AXIS: 105
OD_ADD: +2.50

## 2022-05-27 ASSESSMENT — CONF VISUAL FIELD
OD_NORMAL: 1
OS_NORMAL: 1
METHOD: COUNTING FINGERS

## 2022-05-27 ASSESSMENT — VISUAL ACUITY
OD_CC: 20/600
OS_CC: 20/30
METHOD: SNELLEN - LINEAR
CORRECTION_TYPE: GLASSES

## 2022-05-27 ASSESSMENT — EXTERNAL EXAM - LEFT EYE: OS_EXAM: NORMAL

## 2022-05-27 ASSESSMENT — SLIT LAMP EXAM - LIDS: COMMENTS: MGD

## 2022-05-27 NOTE — NURSING NOTE
Chief Complaints and History of Present Illnesses   Patient presents with     Cataract Evaluation      Chief Complaint(s) and History of Present Illness(es)     Cataract Evaluation     Laterality: right eye              Comments     New patient cataract evaluation.  Says large floaters right eye, sometimes small floaters; denies flashes of light.  No floaters/flashes left eye.  Sometimes a little pain left eye, has increased lately.  Denies nausea, blurred vision left eye.  POH:   Cataract surgery left eye 2/2016  Corneal erosion left eye  Glaucoma suspect  DEANA León 5/27/2022 12:42 PM

## 2022-05-29 NOTE — PROGRESS NOTES
[facility] Care Coordination Contact  CC received notification of Emergency Room visit.  [place] visit occurred on [place]12[place] [facility] [place] pain.    CC contacted member and no answer, no VM available.  Member has a follow-up appointment with PCP: No: Offered Assistance with setting up a follow up appointment  Member has had a change in condition: No  New referrals placed: No  Home Visit Needed: No  Care plan reviewed and updated.  PCP notified of ED visit via EMR.    CARMENCITA Smith  [facility]  [phone]  Fax: [phone]           
no pain, swelling or deformity of joints

## 2022-06-08 ENCOUNTER — TELEPHONE (OUTPATIENT)
Dept: OPHTHALMOLOGY | Facility: CLINIC | Age: 82
End: 2022-06-08
Payer: COMMERCIAL

## 2022-06-08 NOTE — TELEPHONE ENCOUNTER
Called patient to schedule eye procedure with Dr. Mak.    Patient has a voicemail box that has not been set up. So a message could not be left.    This writer will try calling patient again.    Guerita Beck on 6/8/2022 at 5:34 PM

## 2022-06-22 ENCOUNTER — TELEPHONE (OUTPATIENT)
Dept: OPHTHALMOLOGY | Facility: CLINIC | Age: 82
End: 2022-06-22

## 2022-06-22 NOTE — TELEPHONE ENCOUNTER
Called patient to schedule eye procedure with Dr. Mak.     Patient has a voicemail box that has not been set up. So a message could not be left.     This writer will try calling patient again.    Guerita Beck on 6/22/2022 at 7:50 AM

## 2022-07-01 NOTE — TELEPHONE ENCOUNTER
FUTURE VISIT INFORMATION      SURGERY INFORMATION:    Date: 2022    [place] [facility] [place]    Surgeon:Melodie Mak MD    Anesthesia Type:  Combined MAC with Topical    Procedure: RIGHT EYE PHACOEMULSIFICATION, CATARACT, WITH STANDARD INTRAOCULAR LENS IMPLANT INSERTION    Consult: 22    RECORDS REQUESTED FROM:        Primary Care Provider: Dayday Alberts MD - [facility]     Most recent EKG+ Tracin22 - [facility]     Most recent ECHO: 2020    Most recent Cardiac Stress Test: 10/30/17

## 2022-07-11 DIAGNOSIS — H25.811 COMBINED FORM OF AGE-RELATED CATARACT, RIGHT EYE: Primary | ICD-10-CM

## 2022-07-11 RX ORDER — OFLOXACIN 3 MG/ML
1 SOLUTION/ DROPS OPHTHALMIC 4 TIMES DAILY
Qty: 5 ML | Refills: 0 | Status: SHIPPED | OUTPATIENT
Start: 2022-07-11 | End: 2022-10-05

## 2022-07-11 RX ORDER — OFLOXACIN 3 MG/ML
1 SOLUTION/ DROPS OPHTHALMIC
Status: CANCELLED | OUTPATIENT
Start: 2022-07-11

## 2022-07-11 RX ORDER — CYCLOPENTOLAT/TROPIC/PHENYLEPH 1%-1%-2.5%
1 DROPS (EA) OPHTHALMIC (EYE)
Status: CANCELLED | OUTPATIENT
Start: 2022-07-11

## 2022-07-11 RX ORDER — PREDNISOLONE ACETATE 10 MG/ML
1 SUSPENSION/ DROPS OPHTHALMIC 4 TIMES DAILY
Qty: 15 ML | Refills: 1 | Status: SHIPPED | OUTPATIENT
Start: 2022-07-11 | End: 2022-10-05

## 2022-07-11 RX ORDER — PROPARACAINE HYDROCHLORIDE 5 MG/ML
1 SOLUTION/ DROPS OPHTHALMIC ONCE
Status: CANCELLED | OUTPATIENT
Start: 2022-07-11 | End: 2022-07-11

## 2022-07-13 ENCOUNTER — PRE VISIT (OUTPATIENT)
Dept: SURGERY | Facility: CLINIC | Age: 82
End: 2022-07-13

## 2022-07-13 ENCOUNTER — TELEPHONE (OUTPATIENT)
Dept: OPHTHALMOLOGY | Facility: CLINIC | Age: 82
End: 2022-07-13

## 2022-07-13 RX ORDER — HYDRALAZINE HYDROCHLORIDE 20 MG/ML
2.5-5 INJECTION INTRAMUSCULAR; INTRAVENOUS EVERY 10 MIN PRN
Status: CANCELLED | OUTPATIENT
Start: 2022-07-13

## 2022-07-13 RX ORDER — ALBUTEROL SULFATE 0.83 MG/ML
2.5 SOLUTION RESPIRATORY (INHALATION) EVERY 4 HOURS PRN
Status: CANCELLED | OUTPATIENT
Start: 2022-07-13

## 2022-07-13 RX ORDER — ONDANSETRON 2 MG/ML
4 INJECTION INTRAMUSCULAR; INTRAVENOUS EVERY 30 MIN PRN
Status: CANCELLED | OUTPATIENT
Start: 2022-07-13

## 2022-07-13 RX ORDER — ONDANSETRON 4 MG/1
4 TABLET, ORALLY DISINTEGRATING ORAL EVERY 30 MIN PRN
Status: CANCELLED | OUTPATIENT
Start: 2022-07-13

## 2022-07-13 RX ORDER — FENTANYL CITRATE 50 UG/ML
25-50 INJECTION, SOLUTION INTRAMUSCULAR; INTRAVENOUS EVERY 5 MIN PRN
Status: CANCELLED | OUTPATIENT
Start: 2022-07-13

## 2022-07-13 RX ORDER — SODIUM CHLORIDE, SODIUM LACTATE, POTASSIUM CHLORIDE, CALCIUM CHLORIDE 600; 310; 30; 20 MG/100ML; MG/100ML; MG/100ML; MG/100ML
INJECTION, SOLUTION INTRAVENOUS CONTINUOUS
Status: CANCELLED | OUTPATIENT
Start: 2022-07-13

## 2022-07-13 RX ORDER — ACETAMINOPHEN 325 MG/1
975 TABLET ORAL
Status: CANCELLED | OUTPATIENT
Start: 2022-07-13

## 2022-07-13 RX ORDER — LABETALOL HYDROCHLORIDE 5 MG/ML
10 INJECTION, SOLUTION INTRAVENOUS
Status: CANCELLED | OUTPATIENT
Start: 2022-07-13

## 2022-07-13 RX ORDER — HYDROMORPHONE HYDROCHLORIDE 1 MG/ML
.2-.4 INJECTION, SOLUTION INTRAMUSCULAR; INTRAVENOUS; SUBCUTANEOUS EVERY 5 MIN PRN
Status: CANCELLED | OUTPATIENT
Start: 2022-07-13

## 2022-07-13 RX ORDER — MEPERIDINE HYDROCHLORIDE 25 MG/ML
12.5 INJECTION INTRAMUSCULAR; INTRAVENOUS; SUBCUTANEOUS
Status: CANCELLED | OUTPATIENT
Start: 2022-07-13

## 2022-07-13 RX ORDER — OXYCODONE HYDROCHLORIDE 5 MG/1
5 TABLET ORAL EVERY 4 HOURS PRN
Status: CANCELLED | OUTPATIENT
Start: 2022-07-13

## 2022-07-13 RX ORDER — FENTANYL CITRATE 50 UG/ML
25 INJECTION, SOLUTION INTRAMUSCULAR; INTRAVENOUS
Status: CANCELLED | OUTPATIENT
Start: 2022-07-13

## 2022-07-13 NOTE — TELEPHONE ENCOUNTER
Patient missed his scheduled pre op appointment today with PAC.   This writer has called patient but patients number on file is no longer in service.     The other number listed on the patients chart has a voice mailbox that has not been set up. So no message could be left.    Per Dr. Obdulio Corley note.  If patient shows up for surgery tomorrow. He will be advised that he needs to reschedule.    Guerita Beck on 7/13/2022 at 4:18 PM

## 2022-07-14 ENCOUNTER — HOSPITAL ENCOUNTER (OUTPATIENT)
Facility: AMBULATORY SURGERY CENTER | Age: 82
Discharge: HOME | End: 2022-07-14
Attending: OPHTHALMOLOGY
Payer: COMMERCIAL

## 2022-07-14 DIAGNOSIS — H25.811 COMBINED FORMS OF AGE-RELATED CATARACT OF RIGHT EYE: Primary | ICD-10-CM

## 2022-07-28 ENCOUNTER — TELEPHONE (OUTPATIENT)
Dept: OPHTHALMOLOGY | Facility: CLINIC | Age: 82
End: 2022-07-28

## 2022-07-28 NOTE — TELEPHONE ENCOUNTER
Reason for Call:  Other call back    Detailed comments: Patient would like a call back regarding possible surgery questions.    Phone Number Patient can be reached at: Cell number on file:    Telephone Information:   Mobile [phone]   Mobile Not on file.       Best Time: any    Can we leave a detailed message on this number? YES    Call taken on 7/28/2022 at 11:30 AM by Lilli Rodriguez

## 2022-07-29 NOTE — TELEPHONE ENCOUNTER
Returned phone call.  Mobile/home number (same as number listed in phone encounter intake note) is not in service.  Work number listed is not set up

## 2022-08-10 NOTE — TELEPHONE ENCOUNTER
Reason for Call:  Medication or medication refill:    Do you use a [facility]?  Name of the pharmacy and phone number for the current request:  [facility]/ patient only had the fax number for them. Fax [phone]. This is a new pharmacy for patient and they need an  RX from PCP.    Name of the medication requested: gabapentin (NEURONTIN) 100 MG capsule    Other request:     Can we leave a detailed message on this number? YES    Phone number patient can be reached at: Home number on file [phone] (home)    Best Time: any    Call taken on 3/25/2019 at 10:39 AM by Patricia Keller       PAST SURGICAL HISTORY:  No significant past surgical history

## 2022-08-19 ENCOUNTER — TELEPHONE (OUTPATIENT)
Dept: OPHTHALMOLOGY | Facility: CLINIC | Age: 82
End: 2022-08-19

## 2022-08-19 NOTE — TELEPHONE ENCOUNTER
[facility] Call Center    Phone Message    May a detailed message be left on voicemail: yes     Reason for Call: Other:   Pt of Obdulio would like to still proceed with surgery but now that Obdulio is no longer with the [facility], pt would like to know who he can schedule with and if pt needs another consult? Please follow-up to advise. Thank you.     Action Taken: Other:  eye    Travel Screening: Not Applicable

## 2022-08-19 NOTE — TELEPHONE ENCOUNTER
Called and spoke to Bhaskar     Made him an appointment for 8/24 @ 8 am with Dr. Rosa Wise Communication Facilitator on 8/19/2022 at 2:53 PM

## 2022-10-03 DIAGNOSIS — Z53.9 ERRONEOUS ENCOUNTER--DISREGARD: ICD-10-CM

## 2022-10-03 DIAGNOSIS — H40.003 GLAUCOMA SUSPECT OF BOTH EYES: Primary | ICD-10-CM

## 2022-10-05 ENCOUNTER — OFFICE VISIT (OUTPATIENT)
Dept: OPHTHALMOLOGY | Facility: CLINIC | Age: 82
End: 2022-10-05
Attending: STUDENT IN AN ORGANIZED HEALTH CARE EDUCATION/TRAINING PROGRAM
Payer: COMMERCIAL

## 2022-10-05 DIAGNOSIS — H40.003 GLAUCOMA SUSPECT OF BOTH EYES: ICD-10-CM

## 2022-10-05 DIAGNOSIS — H25.811 COMBINED FORM OF AGE-RELATED CATARACT, RIGHT EYE: Primary | ICD-10-CM

## 2022-10-05 DIAGNOSIS — Z96.1 PSEUDOPHAKIA, LEFT EYE: ICD-10-CM

## 2022-10-05 PROCEDURE — 92014 COMPRE OPH EXAM EST PT 1/>: CPT | Mod: GC | Performed by: STUDENT IN AN ORGANIZED HEALTH CARE EDUCATION/TRAINING PROGRAM

## 2022-10-05 PROCEDURE — G0463 HOSPITAL OUTPT CLINIC VISIT: HCPCS

## 2022-10-05 PROCEDURE — 76512 OPH US DX B-SCAN: CPT | Performed by: STUDENT IN AN ORGANIZED HEALTH CARE EDUCATION/TRAINING PROGRAM

## 2022-10-05 RX ORDER — PREDNISOLONE ACETATE 10 MG/ML
1 SUSPENSION/ DROPS OPHTHALMIC 4 TIMES DAILY
Qty: 5 ML | Refills: 0 | Status: SHIPPED | OUTPATIENT
Start: 2022-10-05

## 2022-10-05 RX ORDER — MOXIFLOXACIN 5 MG/ML
1 SOLUTION/ DROPS OPHTHALMIC 4 TIMES DAILY
Qty: 3 ML | Refills: 0 | Status: SHIPPED | OUTPATIENT
Start: 2022-10-05

## 2022-10-05 ASSESSMENT — VISUAL ACUITY
OD_PH_CC: 20/250
CORRECTION_TYPE: GLASSES
OD_CC: 20/400
OS_CC+: +2
OS_CC: 20/30
METHOD: SNELLEN - LINEAR

## 2022-10-05 ASSESSMENT — TONOMETRY
OS_IOP_MMHG: 19
OD_IOP_MMHG: 20
IOP_METHOD: TONOPEN

## 2022-10-05 ASSESSMENT — REFRACTION_WEARINGRX
OD_SPHERE: -1.00
OS_CYLINDER: +1.00
OD_CYLINDER: +1.25
OD_AXIS: 095
OS_AXIS: 104
OS_SPHERE: -1.50

## 2022-10-05 ASSESSMENT — EXTERNAL EXAM - RIGHT EYE: OD_EXAM: NORMAL

## 2022-10-05 ASSESSMENT — SLIT LAMP EXAM - LIDS
COMMENTS: MGD
COMMENTS: MGD

## 2022-10-05 ASSESSMENT — CONF VISUAL FIELD
OD_NORMAL: 1
OS_NORMAL: 1
METHOD: COUNTING FINGERS

## 2022-10-05 ASSESSMENT — EXTERNAL EXAM - LEFT EYE: OS_EXAM: NORMAL

## 2022-10-05 ASSESSMENT — CUP TO DISC RATIO: OS_RATIO: 0.65

## 2022-10-05 NOTE — NURSING NOTE
"Chief Complaints and History of Present Illnesses   Patient presents with     Follow Up     Combined form of age-related cataract, right eye   Glaucoma suspect of both eyes       Chief Complaint(s) and History of Present Illness(es)     Follow Up     Comments: Combined form of age-related cataract, right eye   Glaucoma suspect of both eyes                Comments     Pt here to talk about cataract surgery right eye   Pt states he has flashes of light in both eyes  No floaters BE  Pt states he has eye paib in both eyes off and on \"6/10\" on the pain scale    Renee Spivey COT 12:42 PM October 5, 2022                       "

## 2022-10-05 NOTE — PROGRESS NOTES
"HPI     Follow Up      Additional comments: Combined form of age-related cataract, right eye   Glaucoma suspect of both eyes                Comments     Pt here to talk about cataract surgery right eye   Pt states he has flashes of light in both eyes  No floaters BE  Pt states he has eye paib in both eyes off and on \"6/10\" on the pain scale    Renee Spivey COT 12:42 PM October 5, 2022                 Last edited by Renee Spivey on 10/5/2022 12:42 PM. (History)        Bhaskar Ott is a 81 year old male presents for cataract evaluation. Patient reports more difficulty seeing in the dark. Had right eye cataract surgery scheduled in the past 7/24/2020 but was cancelled due to not having someone help postop. Last seen in the eye clinic by Dr. Mak 5/27/2022.     Review of systems for the eyes was negative other than the pertinent positives/negatives listed in the HPI.    POHx:  CE/IOL left eye 2/2016 - SN60WF lens targeting distance left eye 16.5D  Corneal erosion left eye  Cataract right eye  Glaucoma suspect OU    Ocular Meds: none    PMH:  Hypertension, hypercholesterolemia, prostate cancer (tamsulosin and oxybutynin)  H/o ethanol use disorder    FOHx: no pertinent family ocular history    Assessment & Plan      Bhaskar Ott is a 81 year old male with the following diagnoses:    1. Combined form of age-related cataract, right eye    2. Pseudophakia, left eye    3. Glaucoma suspect of both eyes       Combined form of age-related cataract, right eye  - visually significant and affecting ADLs,  - patient is right eye dominant  - no history of eye trauma/surgery in right eye  - patient would like monofocal lenses aimed at plano and understands the possible need for glasses for distance and near vision  - r/b/a of cataract extraction with intraocular lens insertion including blindness, decreased vision, and need for additional surgeries d/w pt, pt expressed understanding and would like to proceed  including " blindness, decreased vision, and need for additional surgeries; and informed consent was obtained.  - preop/postop drops prescribed (vigamox/predforte) to be taken QID OD each; will hold off on ketorolac as patient has an NSAID allergy noted on epic  - patient will see PCP for surgical clearance  - patient to schedule POD#0, POW#1, POM#1 appt; will only need right eye done as left eye cataract surgery has been done already    - Special equipment/needs:  Eye: right  Anesthesia: possible subtenon or retrobulbar block  Dilates to: 4.5 mm  Iris expansion:  yes  Trypan Blue: Yes  Trauma: No     Able lay to flat: Yes  Blood Thinner: No   Tamsulosin: Yes  DM: No  Fuch's/Guttae/PXF: No   LASIK/PRK/SMILE/Eye Surgery: No     Pseudophakia, left eye  - CE/IOL left eye 2/2016 - SN60WF lens targeting distance left eye 16.5D  - Used RBB  - happy with results, observe    Glaucoma suspect of both eyes  - Based on large C:D  - Pachy: 629/679 (2015)  - Gonio:  - Tmax:  - Today's IOP: 20/19  - Current Meds: none  - Meds to Avoid: none  - VF: first field 2019 --> plan to repeat s/p CE/IOL OD  - Nerve OCT:   Last 2019 --> plan to repeat s/p CE/IOL OD  - will repeat glaucoma eval after cataract surgery given how visually significant right eye cataract is      Patient disposition:   Return for post op visit after cataract surgery; POD1, POW1, POM1.    Adeal Lizama MD  Ophthalmology Resident, PGY-4  [facility]     Attending Physician Attestation:  Complete documentation of historical and exam elements from today's encounter can be found in the full encounter summary report (not reduplicated in this progress note).  I personally obtained the chief complaint(s) and history of present illness.  I confirmed and edited as necessary the review of systems, past medical/surgical history, family history, social history, and examination findings as documented by others; and I examined the patient myself.  I personally reviewed the  relevant tests, images, and reports as documented above.  I formulated and edited as necessary the assessment and plan and discussed the findings and management plan with the patient and family. Today with Bhaskar Ott, I reviewed the indications, risks, benefits, and alternatives of the proposed surgical procedure including, but not limited to, failure obtain the desired result  and need for additional surgery, bleeding, infection, loss of vision, loss of the eye, and the remote possibility of permanent damage to any organ system or death with the use of anesthesia.  I provided multiple opportunities for the questions, answered all questions to the best of my ability, and confirmed that my answers and my discussion were understood.   - Teresa Lee MD

## 2022-10-11 ENCOUNTER — TELEPHONE (OUTPATIENT)
Dept: OPHTHALMOLOGY | Facility: CLINIC | Age: 82
End: 2022-10-11

## 2022-10-11 NOTE — TELEPHONE ENCOUNTER
Unable to leave message for patient. Mailbox has not been set up yet.    Anna C. Schoenecker on 10/11/2022 at 10:40 AM

## 2022-10-13 DIAGNOSIS — K21.9 GASTROESOPHAGEAL REFLUX DISEASE WITHOUT ESOPHAGITIS: ICD-10-CM

## 2022-10-27 ENCOUNTER — TELEPHONE (OUTPATIENT)
Dept: OPHTHALMOLOGY | Facility: CLINIC | Age: 82
End: 2022-10-27

## 2022-10-27 NOTE — TELEPHONE ENCOUNTER
Called patient x3 first try disconnected 2nd and 3rd went to , unable to leave message, mail box has not been set up     Anna C. Schoenecker on 10/27/2022 at 12:12 PM

## 2022-11-03 ENCOUNTER — TELEPHONE (OUTPATIENT)
Dept: OPHTHALMOLOGY | Facility: CLINIC | Age: 82
End: 2022-11-03

## 2022-11-03 NOTE — TELEPHONE ENCOUNTER
Called patient to schedule surgery with Dr. Lee    Date of Surgery: 11/22    Location of surgery: [facility]    Pre-Op H&P: PAC    Pre/Post Imaging:  No    Discussed COVID-19 Testing: Yes    Post-Op Appt Date: 11/23,12/23    Surgery Packet Mailed: yes      Additional comments: Spoke with patient he is aware of above dates will have covid completed,review packet and reach out with any questions           Anna C. Schoenecker on 11/3/2022 at 2:43 PM

## 2022-11-04 NOTE — TELEPHONE ENCOUNTER
FUTURE VISIT INFORMATION      SURGERY INFORMATION:    Date: 22    Location:  or    Surgeon:  Teresa Lee MD    Anesthesia Type:  MAC with Block    Procedure: PHACOEMULSIFICATION, CATARACT, WITH INTRAOCULAR LENS IMPLANT    Consult: ov 10/5/22    RECORDS REQUESTED FROM:       Primary Care Provider: Dayday Alberts [facility]    Pertinent Medical History: hypertension    Most recent EKG+ Tracin/722- [facility]    Most recent ECHO: 20    Most recent Cardiac Stress Test: 10/30/17

## 2022-11-18 ENCOUNTER — OFFICE VISIT (OUTPATIENT)
Dept: SURGERY | Facility: CLINIC | Age: 82
End: 2022-11-18
Payer: COMMERCIAL

## 2022-11-18 ENCOUNTER — LAB (OUTPATIENT)
Dept: LAB | Facility: CLINIC | Age: 82
End: 2022-11-18
Payer: COMMERCIAL

## 2022-11-18 ENCOUNTER — PRE VISIT (OUTPATIENT)
Dept: SURGERY | Facility: CLINIC | Age: 82
End: 2022-11-18

## 2022-11-18 VITALS
SYSTOLIC BLOOD PRESSURE: 150 MMHG | OXYGEN SATURATION: 99 % | RESPIRATION RATE: 16 BRPM | BODY MASS INDEX: 22.59 KG/M2 | HEART RATE: 83 BPM | DIASTOLIC BLOOD PRESSURE: 76 MMHG | HEIGHT: 63 IN | TEMPERATURE: 98 F | WEIGHT: 127.5 LBS

## 2022-11-18 DIAGNOSIS — H25.811 COMBINED FORMS OF AGE-RELATED CATARACT OF RIGHT EYE: ICD-10-CM

## 2022-11-18 DIAGNOSIS — Z01.818 PREOP EXAMINATION: Primary | ICD-10-CM

## 2022-11-18 DIAGNOSIS — Z01.818 PREOP EXAMINATION: ICD-10-CM

## 2022-11-18 LAB — SARS-COV-2 RNA RESP QL NAA+PROBE: NEGATIVE

## 2022-11-18 PROCEDURE — 99204 OFFICE O/P NEW MOD 45 MIN: CPT | Performed by: CLINICAL NURSE SPECIALIST

## 2022-11-18 PROCEDURE — 99000 SPECIMEN HANDLING OFFICE-LAB: CPT | Performed by: PATHOLOGY

## 2022-11-18 PROCEDURE — U0003 INFECTIOUS AGENT DETECTION BY NUCLEIC ACID (DNA OR RNA); SEVERE ACUTE RESPIRATORY SYNDROME CORONAVIRUS 2 (SARS-COV-2) (CORONAVIRUS DISEASE [COVID-19]), AMPLIFIED PROBE TECHNIQUE, MAKING USE OF HIGH THROUGHPUT TECHNOLOGIES AS DESCRIBED BY CMS-2020-01-R: HCPCS | Mod: 90 | Performed by: PATHOLOGY

## 2022-11-18 PROCEDURE — U0005 INFEC AGEN DETEC AMPLI PROBE: HCPCS | Mod: 90 | Performed by: PATHOLOGY

## 2022-11-18 ASSESSMENT — ENCOUNTER SYMPTOMS
SEIZURES: 0
DYSRHYTHMIAS: 0

## 2022-11-18 ASSESSMENT — LIFESTYLE VARIABLES: TOBACCO_USE: 1

## 2022-11-18 ASSESSMENT — PAIN SCALES - GENERAL: PAINLEVEL: NO PAIN (0)

## 2022-11-18 ASSESSMENT — COPD QUESTIONNAIRES: COPD: 1
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
## 2022-11-18 NOTE — PATIENT INSTRUCTIONS
Preparing for Your Surgery      Name:  Bhaskar Ott   MRN:  [id number]   :  1940   Today's Date:  2022         Arriving for surgery:  Surgery date:  2022  Arrival time:  12:30 pm    Restrictions due to COVID 19:    Effective 2022:  2 visitors may accompany patient and wait in the Surgery Waiting Room.  All visitors must wear a mask and social distance.      parking is available for anyone with mobility limitations or disabilities. (Monday- Friday 7 am- 5 pm)    Please come to:    [facility]  [address] [place] [id number]    Please check in on the 5th floor at the Ambulatory Surgery Center.      What can I eat or drink?    -  You may eat and drink normally until 8 hours prior to surgery time. (Until 6 am)  -  You may have clear liquids until 4 hours prior to surgery time. (Until 10:30 am)    Examples of clear liquids:  Water  Clear broth  Juices (apple, white grape, white cranberry  and cider) without pulp  Noncarbonated, powder based beverages  (lemonade and Kvng-Aid)  Sodas (Sprite, 7-Up, ginger ale and seltzer)  Coffee or tea (without milk or cream)  Gatorade    --No alcohol for at least 24 hours before surgery.    Which medicines can I take?    Hold Supplements for 7 days before surgery.  Hold Ibuprofen (Advil, Motrin) for 1 day before surgery--unless otherwise directed by surgeon.  Hold Naproxen (Aleve) for 4 days before surgery.    Hold sildenafil for 24 hours before surgery       -  DO NOT take the following medications the day of surgery:  Nicotrol inhaler       -  PLEASE TAKE the following medications the day of surgery:   All other usual am prescription medications   Please bring inhalers and eye drops to your appointment       How do I prepare myself?  - Please take 2 showers before surgery using Scrubcare or Hibiclens soap.    Use this soap only from the neck to your toes.     Leave the soap on your skin for one minute--then rinse  thoroughly.      You may use your own shampoo and conditioner. No other hair products.   - Please remove all jewelry and body piercings.  - No lotions, deodorants or fragrance.  - No makeup or fingernail polish.   - Bring your ID and insurance card.    -If you have a Deep Brain Stimulator, a Spinal Cord Stimulator, or any implanted Neuro Device, you must bring the remote to the Surgery Center.         ALL PATIENTS ARE REQUIRED TO HAVE A RESPONSIBLE ADULT TO DRIVE AND BE IN ATTENDANCE WITH THEM FOR 24 HOURS FOLLOWING SURGERY.       Questions or Concerns:    -For questions regarding the day of surgery, please contact the Ambulatory Surgery Center at [phone].    -If you have health changes between today and your surgery, please contact your surgeon.     - For questions after surgery, please contact your surgeon's office.

## 2022-11-21 RX ORDER — ONDANSETRON 2 MG/ML
4 INJECTION INTRAMUSCULAR; INTRAVENOUS EVERY 30 MIN PRN
Status: CANCELLED | OUTPATIENT
Start: 2022-11-21

## 2022-11-21 RX ORDER — SODIUM CHLORIDE, SODIUM LACTATE, POTASSIUM CHLORIDE, CALCIUM CHLORIDE 600; 310; 30; 20 MG/100ML; MG/100ML; MG/100ML; MG/100ML
INJECTION, SOLUTION INTRAVENOUS CONTINUOUS
Status: CANCELLED | OUTPATIENT
Start: 2022-11-21

## 2022-11-21 RX ORDER — FENTANYL CITRATE 50 UG/ML
50 INJECTION, SOLUTION INTRAMUSCULAR; INTRAVENOUS EVERY 5 MIN PRN
Status: CANCELLED | OUTPATIENT
Start: 2022-11-21

## 2022-11-21 RX ORDER — HYDROMORPHONE HYDROCHLORIDE 1 MG/ML
0.4 INJECTION, SOLUTION INTRAMUSCULAR; INTRAVENOUS; SUBCUTANEOUS EVERY 5 MIN PRN
Status: CANCELLED | OUTPATIENT
Start: 2022-11-21

## 2022-11-21 RX ORDER — CYCLOPENTOLAT/TROPIC/PHENYLEPH 1%-1%-2.5%
1 DROPS (EA) OPHTHALMIC (EYE)
Status: CANCELLED | OUTPATIENT
Start: 2022-11-21

## 2022-11-21 RX ORDER — FENTANYL CITRATE 50 UG/ML
25 INJECTION, SOLUTION INTRAMUSCULAR; INTRAVENOUS EVERY 5 MIN PRN
Status: CANCELLED | OUTPATIENT
Start: 2022-11-21

## 2022-11-21 RX ORDER — ONDANSETRON 4 MG/1
4 TABLET, ORALLY DISINTEGRATING ORAL EVERY 30 MIN PRN
Status: CANCELLED | OUTPATIENT
Start: 2022-11-21

## 2022-11-21 RX ORDER — MEPERIDINE HYDROCHLORIDE 25 MG/ML
12.5 INJECTION INTRAMUSCULAR; INTRAVENOUS; SUBCUTANEOUS
Status: CANCELLED | OUTPATIENT
Start: 2022-11-21

## 2022-11-21 RX ORDER — LIDOCAINE 40 MG/G
CREAM TOPICAL
Status: CANCELLED | OUTPATIENT
Start: 2022-11-21

## 2022-11-21 RX ORDER — ACETAMINOPHEN 325 MG/1
975 TABLET ORAL ONCE
Status: CANCELLED | OUTPATIENT
Start: 2022-11-21 | End: 2022-11-21

## 2022-11-21 RX ORDER — FENTANYL CITRATE 50 UG/ML
25 INJECTION, SOLUTION INTRAMUSCULAR; INTRAVENOUS
Status: CANCELLED | OUTPATIENT
Start: 2022-11-21

## 2022-11-21 RX ORDER — HYDROMORPHONE HYDROCHLORIDE 1 MG/ML
0.2 INJECTION, SOLUTION INTRAMUSCULAR; INTRAVENOUS; SUBCUTANEOUS EVERY 5 MIN PRN
Status: CANCELLED | OUTPATIENT
Start: 2022-11-21

## 2022-11-21 RX ORDER — PROPARACAINE HYDROCHLORIDE 5 MG/ML
1 SOLUTION/ DROPS OPHTHALMIC ONCE
Status: CANCELLED | OUTPATIENT
Start: 2022-11-22 | End: 2022-11-22

## 2022-11-22 ENCOUNTER — HOSPITAL ENCOUNTER (OUTPATIENT)
Facility: AMBULATORY SURGERY CENTER | Age: 82
Discharge: HOME | End: 2022-11-22
Attending: STUDENT IN AN ORGANIZED HEALTH CARE EDUCATION/TRAINING PROGRAM
Payer: COMMERCIAL

## 2022-11-22 DIAGNOSIS — D23.9 OTHER BENIGN NEOPLASM OF SKIN, UNSPECIFIED: ICD-10-CM

## 2022-11-22 DIAGNOSIS — H25.811 COMBINED FORMS OF AGE-RELATED CATARACT OF RIGHT EYE: Primary | ICD-10-CM

## 2022-11-30 ENCOUNTER — TELEPHONE (OUTPATIENT)
Dept: OPHTHALMOLOGY | Facility: CLINIC | Age: 82
End: 2022-11-30

## 2022-11-30 NOTE — TELEPHONE ENCOUNTER
Reached out to patient to reschedule surgery, unable to leave a message VM not set up       Anna C. Schoenecker on 11/30/2022 at 10:27 AM

## 2022-12-27 ENCOUNTER — TELEPHONE (OUTPATIENT)
Dept: OPHTHALMOLOGY | Facility: CLINIC | Age: 82
End: 2022-12-27

## 2022-12-27 NOTE — TELEPHONE ENCOUNTER
Bhaskar called back to see who was calling, there was no voicemail set up so I could not leave a message when I called earlier. I offered to reschedule Bhaskar with Dr. Teresa Lee and Bhaskar said he would like to but does not have the time right now and not sure when he will be able to have the time off. I provided my direct callback ([phone]) and Ruthann's direct callback number ([phone]) as well.

## 2023-09-01 NOTE — PROGRESS NOTES
Pt could not find a ride or someone to stay with him the day of his colonoscopy. Case cancelled.    Cheek Interpolation Flap Text: A decision was made to reconstruct the defect utilizing an interpolation axial flap and a staged reconstruction.  A telfa template was made of the defect.  This telfa template was then used to outline the Cheek Interpolation flap.  The donor area for the pedicle flap was then injected with anesthesia.  The flap was excised through the skin and subcutaneous tissue down to the layer of the underlying musculature.  The interpolation flap was carefully excised within this deep plane to maintain its blood supply.  The edges of the donor site were undermined.   The donor site was closed in a primary fashion.  The pedicle was then rotated into position and sutured.  Once the tube was sutured into place, adequate blood supply was confirmed with blanching and refill.  The pedicle was then wrapped with xeroform gauze and dressed appropriately with a telfa and gauze bandage to ensure continued blood supply and protect the attached pedicle.

## 2024-02-02 NOTE — TELEPHONE ENCOUNTER
Attempt # 1  Called patient at home number.[phone] (home  Was call answered?  Yes, [facility] still has him getting his prescriptions from them, patient states they called to tell him to  his prescriptions. Patient states thought he had that all figured out a couple months ago when he switched to [facility]. Nurse explained he, the patient, needs to notify [facility] that he will not be using their services otherwise his prescriptions may be on auto refill and insurance will not pay for the prescriptions twice. Patient verbalized understanding and agreement with plan and then asked to speak to Dr. Alberts about the colonoscopy, states had an appointment but had to cancel due to not having anyone to drive him and then stay with him when he got home after the procedure.   Patient would like Dr Alberts to know he is still trying to figure something out so he can have the colonoscopy.        Gretchen Marroquin RN  [facility]      
Noted  Dayday Alberts MD    
Reason for Call:  Other prescription (Oxybutin)    Detailed comments: Pt would like to speak with nurse.    Phone Number Patient can be reached at: Home number on file [phone] (home)    Best Time: Anytime    Can we leave a detailed message on this number? NO    Call taken on 5/7/2019 at 9:32 AM by Ying Jerez    
8

## 2024-06-17 PROBLEM — Z71.89 ADVANCED DIRECTIVES, COUNSELING/DISCUSSION: Status: RESOLVED | Noted: 2017-06-23 | Resolved: 2024-06-17

## 2025-05-06 NOTE — PATIENT INSTRUCTIONS
Call to schedule scope exams.  Clarify that we want both upper and lower scopes done.  Upper endoscopy and colonoscopy.    Continue other meds as is    We we sent in refill on gabapentin, just use as needed     Stay well hydrated    Make sure you don't pick at sores on abdomen      
Action 3: Continue
Detail Level: Zone

## (undated) DEVICE — GLOVE PROTEXIS MICRO 6.5  2D73PM65

## (undated) DEVICE — SOL NACL 0.9% IRRIG 1000ML BOTTLE 2F7124

## (undated) DEVICE — SLING ARM MED 79-99155

## (undated) DEVICE — DRAPE ARTHROSCOPY SHOULDER BEACHCHAIR 29369

## (undated) DEVICE — EYE CANN IRR 27GA ANTERIOR CHAMBER 581280

## (undated) DEVICE — EYE TIP IRRIGATION & ASPIRATION POLYMER CVD 0.3MM 8065751512

## (undated) DEVICE — SPONGE LAP 18X18" X8435

## (undated) DEVICE — EYE SHIELD PLASTIC

## (undated) DEVICE — SU VICRYL 3-0 SH 27" UND J416H

## (undated) DEVICE — MANIFOLD NEPTUNE 4 PORT 700-20

## (undated) DEVICE — PACK SET-UP STD 9102

## (undated) DEVICE — LINEN TOWEL PACK X5 5464

## (undated) DEVICE — ESU GROUND PAD UNIVERSAL W/O CORD

## (undated) DEVICE — EYE KNIFE STILETTO VISITEC 1.1MM ANG 45DEG SIDEPORT 376620

## (undated) DEVICE — CLOSURE SYS SKIN PREMIERPRO EXOFIN FUSION 4X22CM STRL 3472

## (undated) DEVICE — GLOVE PROTEXIS W/NEU-THERA 7.5  2D73TE75

## (undated) DEVICE — SOL WATER IRRIG 1000ML BOTTLE 2F7114

## (undated) DEVICE — GLOVE PROTEXIS BLUE W/NEU-THERA 6.5  2D73EB65

## (undated) DEVICE — PREP CHLORAPREP 26ML TINTED ORANGE  260815

## (undated) DEVICE — GLOVE PROTEXIS BLUE W/NEU-THERA 8.0  2D73EB80

## (undated) DEVICE — PACK OPEN SHOULDER SOP15OCFSC

## (undated) DEVICE — GLOVE PROTEXIS POWDER FREE 6.0 ORTHOPEDIC 2D73ET60

## (undated) DEVICE — SU FIBERWIRE 2 38"  AR-7200

## (undated) DEVICE — DRAPE STERI INCISE 1050

## (undated) DEVICE — SU VICRYL 0 CT-1 27" J340H

## (undated) DEVICE — EYE KNIFE SLIT XSTAR VISITEC 2.6MM 45DEG 373726

## (undated) DEVICE — SOL NACL 0.9% IRRIG 3000ML BAG 07972-08

## (undated) DEVICE — ESU PENCIL W/SMOKE EVAC NEPTUNE STRYKER 0703-046-000

## (undated) DEVICE — Device

## (undated) DEVICE — DRAPE SHEET REV FOLD 3/4 9349

## (undated) DEVICE — SOLUTION WOUND CLEANSING 3/4OZ 10% PVP EA-L3011FB-50

## (undated) DEVICE — SUCTION IRR SYSTEM W/O TIP INTERPULSE HANDPIECE 0210-100-000

## (undated) DEVICE — PIN ALIGNMENT 2.5X200MM

## (undated) DEVICE — BLADE SAW SAGITTAL STRK MED WIDE 25X73X0.89MM 2108-105-000

## (undated) DEVICE — BONE CLEANING TIP INTERPULSE  0210-010-000

## (undated) DEVICE — EYE CANN IRR 25GA CYSTOTOME 581610

## (undated) DEVICE — PACK CATARACT CUSTOM ASC SEY15CPUMC

## (undated) DEVICE — EYE PACK CUSTOM ANTERIOR 30DEG TIP CENTURION PPK6682-04

## (undated) DEVICE — ESU ELEC BLADE 4" COATED

## (undated) DEVICE — HOOD FLYTE W/PEELAWAY 408-800-100

## (undated) DEVICE — DRILL BIT PERIPHERAL SCREW 3.2MM MWJ126

## (undated) DEVICE — SUCTION TIP YANKAUER STR K87

## (undated) DEVICE — SU STRATAFIX TISSUE CONTROL 2 ARM 4-0 FS-2 30CM SXMD2B409

## (undated) DEVICE — SU PDS II 1 TP-1 48" Z880G

## (undated) DEVICE — DRAPE STERI U 1015

## (undated) DEVICE — DRAPE CONVERTORS U-DRAPE 60X72" 8476

## (undated) RX ORDER — PROPOFOL 10 MG/ML
INJECTION, EMULSION INTRAVENOUS
Status: DISPENSED
Start: 2020-07-24

## (undated) RX ORDER — DEXAMETHASONE SODIUM PHOSPHATE 4 MG/ML
INJECTION, SOLUTION INTRA-ARTICULAR; INTRALESIONAL; INTRAMUSCULAR; INTRAVENOUS; SOFT TISSUE
Status: DISPENSED
Start: 2022-03-17

## (undated) RX ORDER — ONDANSETRON 2 MG/ML
INJECTION INTRAMUSCULAR; INTRAVENOUS
Status: DISPENSED
Start: 2022-03-17

## (undated) RX ORDER — PROPOFOL 10 MG/ML
INJECTION, EMULSION INTRAVENOUS
Status: DISPENSED
Start: 2022-03-17

## (undated) RX ORDER — VANCOMYCIN HYDROCHLORIDE 1 G/20ML
INJECTION, POWDER, LYOPHILIZED, FOR SOLUTION INTRAVENOUS
Status: DISPENSED
Start: 2022-03-17

## (undated) RX ORDER — CEFAZOLIN SODIUM/WATER 2 G/20 ML
SYRINGE (ML) INTRAVENOUS
Status: DISPENSED
Start: 2022-03-17

## (undated) RX ORDER — LIDOCAINE HYDROCHLORIDE 20 MG/ML
INJECTION, SOLUTION EPIDURAL; INFILTRATION; INTRACAUDAL; PERINEURAL
Status: DISPENSED
Start: 2020-07-24

## (undated) RX ORDER — FENTANYL CITRATE 50 UG/ML
INJECTION, SOLUTION INTRAMUSCULAR; INTRAVENOUS
Status: DISPENSED
Start: 2022-03-17

## (undated) RX ORDER — TRANEXAMIC ACID 650 MG/1
TABLET ORAL
Status: DISPENSED
Start: 2022-03-17

## (undated) RX ORDER — LIDOCAINE HYDROCHLORIDE 20 MG/ML
INJECTION, SOLUTION EPIDURAL; INFILTRATION; INTRACAUDAL; PERINEURAL
Status: DISPENSED
Start: 2022-03-17

## (undated) RX ORDER — ACETAMINOPHEN 325 MG/1
TABLET ORAL
Status: DISPENSED
Start: 2022-03-17